# Patient Record
Sex: FEMALE | Race: WHITE | NOT HISPANIC OR LATINO | Employment: OTHER | URBAN - METROPOLITAN AREA
[De-identification: names, ages, dates, MRNs, and addresses within clinical notes are randomized per-mention and may not be internally consistent; named-entity substitution may affect disease eponyms.]

---

## 2018-02-06 ENCOUNTER — CONVERSION ENCOUNTER (OUTPATIENT)
Dept: INTERNAL MEDICINE | Facility: CLINIC | Age: 83
End: 2018-02-06

## 2018-02-06 ENCOUNTER — OFFICE VISIT CONVERTED (OUTPATIENT)
Dept: INTERNAL MEDICINE | Facility: CLINIC | Age: 83
End: 2018-02-06
Attending: INTERNAL MEDICINE

## 2018-04-13 ENCOUNTER — OFFICE VISIT CONVERTED (OUTPATIENT)
Dept: INTERNAL MEDICINE | Facility: CLINIC | Age: 83
End: 2018-04-13
Attending: INTERNAL MEDICINE

## 2018-04-30 ENCOUNTER — OFFICE VISIT CONVERTED (OUTPATIENT)
Dept: INTERNAL MEDICINE | Facility: CLINIC | Age: 83
End: 2018-04-30
Attending: INTERNAL MEDICINE

## 2018-06-08 ENCOUNTER — CONVERSION ENCOUNTER (OUTPATIENT)
Dept: INTERNAL MEDICINE | Facility: CLINIC | Age: 83
End: 2018-06-08

## 2018-06-08 ENCOUNTER — OFFICE VISIT CONVERTED (OUTPATIENT)
Dept: INTERNAL MEDICINE | Facility: CLINIC | Age: 83
End: 2018-06-08
Attending: INTERNAL MEDICINE

## 2018-07-17 ENCOUNTER — OFFICE VISIT CONVERTED (OUTPATIENT)
Dept: INTERNAL MEDICINE | Facility: CLINIC | Age: 83
End: 2018-07-17
Attending: INTERNAL MEDICINE

## 2018-08-20 ENCOUNTER — OFFICE VISIT CONVERTED (OUTPATIENT)
Dept: INTERNAL MEDICINE | Facility: CLINIC | Age: 83
End: 2018-08-20
Attending: INTERNAL MEDICINE

## 2018-11-20 ENCOUNTER — OFFICE VISIT CONVERTED (OUTPATIENT)
Dept: INTERNAL MEDICINE | Facility: CLINIC | Age: 83
End: 2018-11-20
Attending: INTERNAL MEDICINE

## 2019-01-22 ENCOUNTER — HOSPITAL ENCOUNTER (OUTPATIENT)
Dept: OTHER | Facility: HOSPITAL | Age: 84
Discharge: HOME | End: 2019-01-22
Attending: INTERNAL MEDICINE

## 2019-01-22 ENCOUNTER — CONVERSION ENCOUNTER (OUTPATIENT)
Dept: INTERNAL MEDICINE | Facility: CLINIC | Age: 84
End: 2019-01-22

## 2019-01-22 ENCOUNTER — OFFICE VISIT CONVERTED (OUTPATIENT)
Dept: INTERNAL MEDICINE | Facility: CLINIC | Age: 84
End: 2019-01-22
Attending: INTERNAL MEDICINE

## 2019-02-15 ENCOUNTER — HOSPITAL ENCOUNTER (OUTPATIENT)
Dept: PHYSICAL THERAPY | Facility: CLINIC | Age: 84
Setting detail: RECURRING SERIES
Discharge: HOME | End: 2019-02-18
Attending: INTERNAL MEDICINE

## 2019-05-21 ENCOUNTER — OFFICE VISIT CONVERTED (OUTPATIENT)
Dept: INTERNAL MEDICINE | Facility: CLINIC | Age: 84
End: 2019-05-21
Attending: INTERNAL MEDICINE

## 2019-05-28 ENCOUNTER — HOSPITAL ENCOUNTER (OUTPATIENT)
Dept: OTHER | Facility: HOSPITAL | Age: 84
Discharge: HOME | End: 2019-05-28
Attending: INTERNAL MEDICINE

## 2019-05-28 LAB
ALBUMIN SERPL-MCNC: 4.1 G/DL (ref 3.5–5)
ALBUMIN/GLOB SERPL: 1.6 {RATIO} (ref 1.4–2.6)
ALP SERPL-CCNC: 59 U/L (ref 43–160)
ALT SERPL-CCNC: 12 U/L (ref 10–40)
ANION GAP SERPL CALC-SCNC: 15 MMOL/L (ref 8–19)
AST SERPL-CCNC: 21 U/L (ref 15–50)
BASOPHILS # BLD AUTO: 0.04 10*3/UL (ref 0–0.2)
BASOPHILS NFR BLD AUTO: 0.9 % (ref 0–3)
BILIRUB SERPL-MCNC: 0.95 MG/DL (ref 0.2–1.3)
BUN SERPL-MCNC: 13 MG/DL (ref 5–25)
BUN/CREAT SERPL: 23 {RATIO} (ref 6–20)
CALCIUM SERPL-MCNC: 9.3 MG/DL (ref 8.7–10.4)
CHLORIDE SERPL-SCNC: 102 MMOL/L (ref 99–111)
CHOLEST SERPL-MCNC: 241 MG/DL (ref 107–200)
CHOLEST/HDLC SERPL: 2.8 {RATIO} (ref 3–6)
CONV ABS IMM GRAN: 0.01 10*3/UL (ref 0–0.2)
CONV CO2: 29 MMOL/L (ref 22–32)
CONV IMMATURE GRAN: 0.2 % (ref 0–1.8)
CONV TOTAL PROTEIN: 6.6 G/DL (ref 6.3–8.2)
CREAT UR-MCNC: 0.56 MG/DL (ref 0.5–0.9)
DEPRECATED RDW RBC AUTO: 46.7 FL (ref 36.4–46.3)
EOSINOPHIL # BLD AUTO: 0.19 10*3/UL (ref 0–0.7)
EOSINOPHIL # BLD AUTO: 4.1 % (ref 0–7)
ERYTHROCYTE [DISTWIDTH] IN BLOOD BY AUTOMATED COUNT: 13.6 % (ref 11.7–14.4)
FOLATE SERPL-MCNC: >20 NG/ML (ref 4.8–20)
GFR SERPLBLD BASED ON 1.73 SQ M-ARVRAT: >60 ML/MIN/{1.73_M2}
GLOBULIN UR ELPH-MCNC: 2.5 G/DL (ref 2–3.5)
GLUCOSE SERPL-MCNC: 94 MG/DL (ref 65–99)
HBA1C MFR BLD: 13.5 G/DL (ref 12–16)
HCT VFR BLD AUTO: 41.8 % (ref 37–47)
HDLC SERPL-MCNC: 85 MG/DL (ref 40–60)
IRON SATN MFR SERPL: 38 % (ref 20–55)
IRON SERPL-MCNC: 128 UG/DL (ref 60–170)
LDLC SERPL CALC-MCNC: 140 MG/DL (ref 70–100)
LYMPHOCYTES # BLD AUTO: 2.38 10*3/UL (ref 1–5)
MCH RBC QN AUTO: 30.2 PG (ref 27–31)
MCHC RBC AUTO-ENTMCNC: 32.3 G/DL (ref 33–37)
MCV RBC AUTO: 93.5 FL (ref 81–99)
MONOCYTES # BLD AUTO: 0.36 10*3/UL (ref 0.2–1.2)
MONOCYTES NFR BLD AUTO: 7.7 % (ref 3–10)
NEUTROPHILS # BLD AUTO: 1.7 10*3/UL (ref 2–8)
NEUTROPHILS NFR BLD AUTO: 36.2 % (ref 30–85)
NRBC CBCN: 0 % (ref 0–0.7)
OSMOLALITY SERPL CALC.SUM OF ELEC: 294 MOSM/KG (ref 273–304)
PLATELET # BLD AUTO: 239 10*3/UL (ref 130–400)
PMV BLD AUTO: 10.3 FL (ref 9.4–12.3)
POTASSIUM SERPL-SCNC: 4 MMOL/L (ref 3.5–5.3)
RBC # BLD AUTO: 4.47 10*6/UL (ref 4.2–5.4)
SODIUM SERPL-SCNC: 142 MMOL/L (ref 135–147)
T4 FREE SERPL-MCNC: 1.1 NG/DL (ref 0.9–1.8)
TIBC SERPL-MCNC: 337 UG/DL (ref 245–450)
TRANSFERRIN SERPL-MCNC: 236 MG/DL (ref 250–380)
TRIGL SERPL-MCNC: 80 MG/DL (ref 40–150)
TSH SERPL-ACNC: 1.24 M[IU]/L (ref 0.27–4.2)
VARIANT LYMPHS NFR BLD MANUAL: 50.9 % (ref 20–45)
VIT B12 SERPL-MCNC: 651 PG/ML (ref 211–911)
VLDLC SERPL-MCNC: 16 MG/DL (ref 5–37)
WBC # BLD AUTO: 4.68 10*3/UL (ref 4.8–10.8)

## 2019-07-30 ENCOUNTER — CONVERSION ENCOUNTER (OUTPATIENT)
Dept: INTERNAL MEDICINE | Facility: CLINIC | Age: 84
End: 2019-07-30

## 2019-07-30 ENCOUNTER — OFFICE VISIT CONVERTED (OUTPATIENT)
Dept: INTERNAL MEDICINE | Facility: CLINIC | Age: 84
End: 2019-07-30
Attending: INTERNAL MEDICINE

## 2019-11-01 ENCOUNTER — CONVERSION ENCOUNTER (OUTPATIENT)
Dept: INTERNAL MEDICINE | Facility: CLINIC | Age: 84
End: 2019-11-01

## 2019-11-01 ENCOUNTER — OFFICE VISIT CONVERTED (OUTPATIENT)
Dept: INTERNAL MEDICINE | Facility: CLINIC | Age: 84
End: 2019-11-01
Attending: INTERNAL MEDICINE

## 2020-01-14 ENCOUNTER — CONVERSION ENCOUNTER (OUTPATIENT)
Dept: INTERNAL MEDICINE | Facility: CLINIC | Age: 85
End: 2020-01-14

## 2020-01-14 ENCOUNTER — OFFICE VISIT CONVERTED (OUTPATIENT)
Dept: INTERNAL MEDICINE | Facility: CLINIC | Age: 85
End: 2020-01-14
Attending: INTERNAL MEDICINE

## 2020-03-03 ENCOUNTER — OFFICE VISIT CONVERTED (OUTPATIENT)
Dept: INTERNAL MEDICINE | Facility: CLINIC | Age: 85
End: 2020-03-03
Attending: INTERNAL MEDICINE

## 2020-03-03 ENCOUNTER — CONVERSION ENCOUNTER (OUTPATIENT)
Dept: INTERNAL MEDICINE | Facility: CLINIC | Age: 85
End: 2020-03-03

## 2020-03-12 ENCOUNTER — HOSPITAL ENCOUNTER (OUTPATIENT)
Dept: OTHER | Facility: HOSPITAL | Age: 85
Discharge: HOME | End: 2020-03-12
Attending: INTERNAL MEDICINE

## 2020-03-12 LAB
25(OH)D3 SERPL-MCNC: 54.7 NG/ML (ref 30–100)
ALBUMIN SERPL-MCNC: 4.2 G/DL (ref 3.5–5)
ALBUMIN/GLOB SERPL: 1.7 {RATIO} (ref 1.4–2.6)
ALP SERPL-CCNC: 64 U/L (ref 43–160)
ALT SERPL-CCNC: 19 U/L (ref 10–40)
ANION GAP SERPL CALC-SCNC: 16 MMOL/L (ref 8–19)
AST SERPL-CCNC: 30 U/L (ref 15–50)
BASOPHILS # BLD AUTO: 0.03 10*3/UL (ref 0–0.2)
BASOPHILS NFR BLD AUTO: 0.6 % (ref 0–3)
BILIRUB SERPL-MCNC: 0.79 MG/DL (ref 0.2–1.3)
BUN SERPL-MCNC: 11 MG/DL (ref 5–25)
BUN/CREAT SERPL: 20 {RATIO} (ref 6–20)
CALCIUM SERPL-MCNC: 9.6 MG/DL (ref 8.7–10.4)
CHLORIDE SERPL-SCNC: 101 MMOL/L (ref 99–111)
CONV ABS IMM GRAN: 0.01 10*3/UL (ref 0–0.2)
CONV CO2: 28 MMOL/L (ref 22–32)
CONV IMMATURE GRAN: 0.2 % (ref 0–1.8)
CONV TOTAL PROTEIN: 6.7 G/DL (ref 6.3–8.2)
CREAT UR-MCNC: 0.55 MG/DL (ref 0.5–0.9)
DEPRECATED RDW RBC AUTO: 46 FL (ref 36.4–46.3)
EOSINOPHIL # BLD AUTO: 0.22 10*3/UL (ref 0–0.7)
EOSINOPHIL # BLD AUTO: 4.2 % (ref 0–7)
ERYTHROCYTE [DISTWIDTH] IN BLOOD BY AUTOMATED COUNT: 13.3 % (ref 11.7–14.4)
GFR SERPLBLD BASED ON 1.73 SQ M-ARVRAT: >60 ML/MIN/{1.73_M2}
GLOBULIN UR ELPH-MCNC: 2.5 G/DL (ref 2–3.5)
GLUCOSE SERPL-MCNC: 94 MG/DL (ref 65–99)
HCT VFR BLD AUTO: 42.5 % (ref 37–47)
HGB BLD-MCNC: 13.8 G/DL (ref 12–16)
IRON SATN MFR SERPL: 17 % (ref 20–55)
IRON SERPL-MCNC: 60 UG/DL (ref 60–170)
LYMPHOCYTES # BLD AUTO: 2.32 10*3/UL (ref 1–5)
LYMPHOCYTES NFR BLD AUTO: 44.5 % (ref 20–45)
MCH RBC QN AUTO: 30.7 PG (ref 27–31)
MCHC RBC AUTO-ENTMCNC: 32.5 G/DL (ref 33–37)
MCV RBC AUTO: 94.7 FL (ref 81–99)
MONOCYTES # BLD AUTO: 0.44 10*3/UL (ref 0.2–1.2)
MONOCYTES NFR BLD AUTO: 8.4 % (ref 3–10)
NEUTROPHILS # BLD AUTO: 2.19 10*3/UL (ref 2–8)
NEUTROPHILS NFR BLD AUTO: 42.1 % (ref 30–85)
NRBC CBCN: 0 % (ref 0–0.7)
OSMOLALITY SERPL CALC.SUM OF ELEC: 291 MOSM/KG (ref 273–304)
PLATELET # BLD AUTO: 235 10*3/UL (ref 130–400)
PMV BLD AUTO: 10.3 FL (ref 9.4–12.3)
POTASSIUM SERPL-SCNC: 4.1 MMOL/L (ref 3.5–5.3)
RBC # BLD AUTO: 4.49 10*6/UL (ref 4.2–5.4)
SODIUM SERPL-SCNC: 141 MMOL/L (ref 135–147)
T4 FREE SERPL-MCNC: 1.2 NG/DL (ref 0.9–1.8)
TIBC SERPL-MCNC: 356 UG/DL (ref 245–450)
TRANSFERRIN SERPL-MCNC: 249 MG/DL (ref 250–380)
TSH SERPL-ACNC: 1.21 M[IU]/L (ref 0.27–4.2)
WBC # BLD AUTO: 5.21 10*3/UL (ref 4.8–10.8)

## 2020-03-14 LAB
CONV EBV EARLY ANTIGEN: 10.6 U/ML (ref 0–10.9)
CONV EBV NUCLEAR ANTIGEN: 63.2 U/ML (ref 0–21.9)
CONV EPSTEIN BARR VIRAL CAPSID IGM: <10 U/ML (ref 0–43.9)
CONV EPSTEIN BARR VIRUS ANTIBODY TO VIRAL CAPSID IGG: 526 U/ML (ref 0–21.9)

## 2020-05-04 ENCOUNTER — TELEPHONE CONVERTED (OUTPATIENT)
Dept: INTERNAL MEDICINE | Facility: CLINIC | Age: 85
End: 2020-05-04
Attending: INTERNAL MEDICINE

## 2020-06-12 ENCOUNTER — TELEPHONE CONVERTED (OUTPATIENT)
Dept: INTERNAL MEDICINE | Facility: CLINIC | Age: 85
End: 2020-06-12
Attending: INTERNAL MEDICINE

## 2021-01-26 ENCOUNTER — TELEPHONE CONVERTED (OUTPATIENT)
Dept: INTERNAL MEDICINE | Facility: CLINIC | Age: 86
End: 2021-01-26
Attending: INTERNAL MEDICINE

## 2021-02-22 ENCOUNTER — HOSPITAL ENCOUNTER (OUTPATIENT)
Dept: VACCINE CLINIC | Facility: HOSPITAL | Age: 86
Discharge: HOME | End: 2021-02-22
Attending: INTERNAL MEDICINE

## 2021-03-17 ENCOUNTER — HOSPITAL ENCOUNTER (OUTPATIENT)
Dept: OTHER | Facility: HOSPITAL | Age: 86
Discharge: HOME | End: 2021-03-17
Attending: INTERNAL MEDICINE

## 2021-03-17 ENCOUNTER — OFFICE VISIT CONVERTED (OUTPATIENT)
Dept: INTERNAL MEDICINE | Facility: CLINIC | Age: 86
End: 2021-03-17
Attending: INTERNAL MEDICINE

## 2021-03-17 ENCOUNTER — CONVERSION ENCOUNTER (OUTPATIENT)
Dept: INTERNAL MEDICINE | Facility: CLINIC | Age: 86
End: 2021-03-17

## 2021-03-17 LAB
ALBUMIN SERPL-MCNC: 4.4 G/DL (ref 3.5–5)
ALBUMIN/GLOB SERPL: 1.7 {RATIO} (ref 1.4–2.6)
ALP SERPL-CCNC: 68 U/L (ref 43–160)
ALT SERPL-CCNC: 19 U/L (ref 10–40)
ANION GAP SERPL CALC-SCNC: 15 MMOL/L (ref 8–19)
AST SERPL-CCNC: 26 U/L (ref 15–50)
BASOPHILS # BLD AUTO: 0.04 10*3/UL (ref 0–0.2)
BASOPHILS NFR BLD AUTO: 0.7 % (ref 0–3)
BILIRUB SERPL-MCNC: 0.62 MG/DL (ref 0.2–1.3)
BUN SERPL-MCNC: 16 MG/DL (ref 5–25)
BUN/CREAT SERPL: 34 {RATIO} (ref 6–20)
CALCIUM SERPL-MCNC: 10 MG/DL (ref 8.7–10.4)
CHLORIDE SERPL-SCNC: 102 MMOL/L (ref 99–111)
CHOLEST SERPL-MCNC: 259 MG/DL (ref 107–200)
CHOLEST/HDLC SERPL: 2.7 {RATIO} (ref 3–6)
CONV ABS IMM GRAN: 0.01 10*3/UL (ref 0–0.2)
CONV CO2: 32 MMOL/L (ref 22–32)
CONV IMMATURE GRAN: 0.2 % (ref 0–1.8)
CONV TOTAL PROTEIN: 7 G/DL (ref 6.3–8.2)
CREAT UR-MCNC: 0.47 MG/DL (ref 0.5–0.9)
DEPRECATED RDW RBC AUTO: 46 FL (ref 36.4–46.3)
EOSINOPHIL # BLD AUTO: 0.06 10*3/UL (ref 0–0.7)
EOSINOPHIL # BLD AUTO: 1.1 % (ref 0–7)
ERYTHROCYTE [DISTWIDTH] IN BLOOD BY AUTOMATED COUNT: 12.9 % (ref 11.7–14.4)
FOLATE SERPL-MCNC: >20 NG/ML (ref 4.8–20)
GFR SERPLBLD BASED ON 1.73 SQ M-ARVRAT: >60 ML/MIN/{1.73_M2}
GLOBULIN UR ELPH-MCNC: 2.6 G/DL (ref 2–3.5)
GLUCOSE SERPL-MCNC: 112 MG/DL (ref 65–99)
HCT VFR BLD AUTO: 44.6 % (ref 37–47)
HDLC SERPL-MCNC: 97 MG/DL (ref 40–60)
HGB BLD-MCNC: 14.2 G/DL (ref 12–16)
LDLC SERPL CALC-MCNC: 146 MG/DL (ref 70–100)
LYMPHOCYTES # BLD AUTO: 1.72 10*3/UL (ref 1–5)
LYMPHOCYTES NFR BLD AUTO: 32.1 % (ref 20–45)
MCH RBC QN AUTO: 30.7 PG (ref 27–31)
MCHC RBC AUTO-ENTMCNC: 31.8 G/DL (ref 33–37)
MCV RBC AUTO: 96.5 FL (ref 81–99)
MONOCYTES # BLD AUTO: 0.48 10*3/UL (ref 0.2–1.2)
MONOCYTES NFR BLD AUTO: 9 % (ref 3–10)
NEUTROPHILS # BLD AUTO: 3.05 10*3/UL (ref 2–8)
NEUTROPHILS NFR BLD AUTO: 56.9 % (ref 30–85)
NRBC CBCN: 0 % (ref 0–0.7)
OSMOLALITY SERPL CALC.SUM OF ELEC: 302 MOSM/KG (ref 273–304)
PLATELET # BLD AUTO: 253 10*3/UL (ref 130–400)
PMV BLD AUTO: 10.4 FL (ref 9.4–12.3)
POTASSIUM SERPL-SCNC: 4.3 MMOL/L (ref 3.5–5.3)
RBC # BLD AUTO: 4.62 10*6/UL (ref 4.2–5.4)
SODIUM SERPL-SCNC: 145 MMOL/L (ref 135–147)
T4 FREE SERPL-MCNC: 1.3 NG/DL (ref 0.9–1.8)
TRIGL SERPL-MCNC: 81 MG/DL (ref 40–150)
TSH SERPL-ACNC: 0.83 M[IU]/L (ref 0.27–4.2)
VIT B12 SERPL-MCNC: 816 PG/ML (ref 211–911)
VLDLC SERPL-MCNC: 16 MG/DL (ref 5–37)
WBC # BLD AUTO: 5.36 10*3/UL (ref 4.8–10.8)

## 2021-03-18 LAB — 25(OH)D3 SERPL-MCNC: 62.9 NG/ML (ref 30–100)

## 2021-03-26 ENCOUNTER — HOSPITAL ENCOUNTER (OUTPATIENT)
Dept: VACCINE CLINIC | Facility: HOSPITAL | Age: 86
Discharge: HOME | End: 2021-03-26
Attending: INTERNAL MEDICINE

## 2021-04-15 ENCOUNTER — HOSPITAL ENCOUNTER (OUTPATIENT)
Dept: PHYSICAL THERAPY | Facility: CLINIC | Age: 86
Setting detail: RECURRING SERIES
Discharge: HOME | End: 2021-04-15
Attending: INTERNAL MEDICINE

## 2021-05-13 NOTE — PROGRESS NOTES
"   Quick Note      Patient Name: Emi Ball   Patient ID: [id number]   Sex: Female   YOB: 1935    Primary Care Provider: Yoselyn Godfrey MD   Referring Provider: Yoselyn Godfrey MD    Visit Date: May 4, 2020    Provider: Yoselyn Godfrey MD   Location: [facility]   Location Address: [address] [place] [address]  [id number]   Location Phone: [phone]          History Of Present Illness  TELEHEALTH TELEPHONE VISIT  Chief Complaint: \"my reflux is worse\"   Emi Ball is a 84 year old /White female who is presenting for evaluation via telehealth telephone visit. Verbal consent obtained before beginning visit.   Provider spent 11 minutes with the patient during telehealth visit.   The following staff were present during this visit: call started by Suzanne Carpenter and transferred to me   Past Medical History/Overview of Patient Symptoms     states that she got the liquid medicine for reflux however it made her sick and then it was recalled  she is now having some dysphagia she thinks this is because her reflux is worse  it has been going on about 3 days now    does feel like allergies are a little worse right now    bent over and had pain in her side on standing  now has pain with certain movements  when not moving hands overhead etc she feels normal  no numbness or tingling    has been able to get brace for scoliosis or do PT due to COVID             Physical Examination                Assessment  · Gastroesophageal reflux     530.81/K21.9  will try prevacid  · Scoliosis     737.30/M41.9  reschedule pt and brace appt when able    Problems Reconciled  Plan  · Orders  o Physician Telephone Evaluation, 11-20 minutes (27799) - - 05/04/2020  · Medications  o Prevacid SoluTab 15 mg oral tablet,disintegrat, delay rel   SIG: take 1 tablet (15 mg) and place on top of the tongue where it will dissolve, then swallow by oral route once daily   DISP: (90) tablets with 0 " refills  Prescribed on 05/04/2020     o Medications have been Reconciled  o Transition of Care or Provider Policy  · Instructions  o Plan Of Care:             Electronically Signed by: Yoeslyn Godfrey MD -Author on May 4, 2020 03:02:30 PM

## 2021-05-13 NOTE — PROGRESS NOTES
"   Progress Note      Patient Name: Emi Ball   Patient ID: [id number]   Sex: Female   YOB: 1935    Primary Care Provider: Yoselyn Godfrey MD   Referring Provider: Yoselyn Godfrey MD    Visit Date: June 12, 2020    Provider: Yoselyn Godfrey MD   Location: [facility]   Location Address: [address] [place] [address]  [id number]   Location Phone: [phone]          History Of Present Illness  TELEHEALTH TELEPHONE VISIT  Chief Complaint: \"follow up for my allergies\"   Emi Ball is a 84 year old /White female who is presenting for evaluation via telehealth telephone visit. Verbal consent obtained before beginning visit.   Provider spent 11 minutes with the patient during the telehealth visit.   The following staff were present during this visit: none   Past Medical History/ Overview of Patient Symptoms  Emi Ball is a 84 year old /White female who presents for evaluation and treatment of:      chornic issues    her back isn't doing better  but she didn't go to therapy or to the  clinic for a brace because of COVID    states that the stomach medicine she got was very expensive  it doesn't seem to be helping tremendously either at this point    allergies are stable    no chest pain  no trouble breathing         Past Medical History  Disease Name Date Onset Notes   Gastroesophageal reflux --  --    Myocardial Infarction --  --    Otalgia --  --    Pain, Head --  --    Scoliosis 05/04/2020 --    Vitamin D deficiency --  --          Past Surgical History  Procedure Name Date Notes   *No Past Surgical History --  --          Medication List  Name Date Started Instructions   aspirin 81 mg Oral Tablet, Delayed Release (E.C.)  --    Caltrate 600 + D 600 mg (1,500 mg)-800 unit oral tablet,chewable  --    Co Q-10 oral  --    Fish Oil 1,000 mg Oral Capsule  --    multivitamin Oral Tablet  take 1 tablet by oral route once daily with food "   Prevacid SoluTab 15 mg oral tablet,disintegrat, delay rel 06/19/2020 take 1 tablet (15 mg) and place on top of the tongue where it will dissolve, then swallow by oral route once daily   Vitamin D3 2,000 unit oral capsule  --          Allergy List  Allergen Name Date Reaction Notes   iodine --  --  --    IV DYE, IODINE CONTAINING CONTRAST MEDIA --  --  --    Strawberry --  --  --          Family Medical History  Disease Name Relative/Age Notes   Heart Attack (MI)  --          Social History  Finding Status Start/Stop Quantity Notes   Second hand smoke exposure Former --/-- --  30+ years   Tobacco Never --/-- --  --          Immunizations  NameDate Admin Mfg Trade Name Lot Number Route Inj VIS Given VIS Publication   [id number]/01/2018 SKB Fluarix, quadrivalent, preservative free [id number] NE NE 11/20/2018 08/07/2015   Comments:          Review of Systems  · Constitutional  o Denies  o : fever, fatigue, weight loss, weight gain  · Cardiovascular  o Denies  o : lower extremity edema, claudication, chest pressure, palpitations  · Respiratory  o Denies  o : shortness of breath, wheezing, frequent cough, hemoptysis, dyspnea on exertion  · Gastrointestinal  o Denies  o : nausea, vomiting, diarrhea, constipation, abdominal pain      Physical Examination     Gen: well-nourished, no acute distress  HENT: atraumatic, normocephalic  Eyes: extraocular movements intact, no scleral icterus  Lung: breathing comfortably, no cough  Skin: no visible rash, no lesions  Neuro: grossly oriented to person, place, and time. no facial droop   Psych: normal mood and affect               Assessment  · Scoliosis     737.30/M41.9  encouraged her to get back in with PT and with the brace people  offered follow up with imaging, but she wishes to hold off at this point  · Gastroesophageal reflux     530.81/K21.9  will try to find a better medicine that works for her    Problems Reconciled  Plan  · Orders  o Physician Telephone Evaluation, 11-20  minutes (77447) - - 06/19/2020  o ACO-39: Current medications updated and reviewed () - - 06/12/2020  · Medications  o Medications have been Reconciled  o Transition of Care or Provider Policy  · Instructions  o Plan Of Care:   o Patient was educated/instructed on their diagnosis, treatment and medications prior to discharge from the clinic today.            Electronically Signed by: Yoselyn Godfrey MD -Author on June 19, 2020 07:52:25 PM

## 2021-05-14 VITALS
BODY MASS INDEX: 18.37 KG/M2 | HEIGHT: 58 IN | TEMPERATURE: 99 F | HEART RATE: 90 BPM | OXYGEN SATURATION: 94 % | SYSTOLIC BLOOD PRESSURE: 140 MMHG | DIASTOLIC BLOOD PRESSURE: 90 MMHG | WEIGHT: 87.5 LBS

## 2021-05-14 NOTE — PROGRESS NOTES
"   Progress Note      Patient Name: Emi Ball   Patient ID: [id number]   Sex: Female   YOB: 1935    Primary Care Provider: Yoselyn Godfrey MD   Referring Provider: Yoselyn Godfrey MD    Visit Date: January 26, 2021    Provider: Yoselyn Godfrey MD   Location: [facility]   Location Address: [address] [place] [address]  [id number]   Location Phone: [phone]          Chief Complaint  · \"when I got outside I lose my voice\"  · \"a air fryer fell on my right leg and my right foot and lower leg is swollen\"  · \"I get short of air at times\"  · \"when can I get my COVID vaccine\"      History Of Present Illness  TELEHEALTH TELEPHONE VISIT  Emi Ball is a 85 year old /White female who is presenting for evaluation via telehealth telephone visit. Verbal consent obtained before beginning visit.   Provider spent 15 minutes with the patient during the telehealth visit.   The following staff were present during this visit: none   Past Medical History/ Overview of Patient Symptoms  Emi Ball is a 85 year old /White female who presents for evaluation and treatment of:      states that she really wants the covid vaccine    states that she is losing her voice when she goes outside  Otherwise her voice does fine  She has not been taking allergy medicines    Still dealing with her GERD would like a refill on her Prevacid    Has not been seen for her back however she does feel like her back is worsening she states that she does not want to get out and about during Covid even if it means her back is going to worsen  No numbness or tingling         Review of Systems  · Constitutional  o Denies  o : fever, fatigue, weight loss, weight gain  · Cardiovascular  o Denies  o : lower extremity edema, claudication, chest pressure, palpitations  · Respiratory  o Denies  o : shortness of breath, wheezing, frequent cough, hemoptysis, dyspnea on " exertion  · Gastrointestinal  o Denies  o : nausea, vomiting, diarrhea, constipation, abdominal pain          Assessment  · Scoliosis     737.30/M41.9  Think about if she would like further treatment for this and let us know  Has been unwilling to do therapy etc. due to Covid  · Gastroesophageal reflux     530.81/K21.9  Will restart medication  · Vitamin D deficiency     268.9/E55.9  · Screening for lipid disorders     V77.91/Z13.220  · Allergic rhinitis due to allergen     477.9/J30.9  Claritin  · Fatigue     780.79/R53.83  Will check labs and adjust medications based on lab results  · Dyspnea     786.09/R06.00  Unsure of etiology she will monitor closely will try medication first    Problems Reconciled  Plan  · Orders  o Vitamin D Level (16705) - 268.9/E55.9 - 01/26/2021  o Lipid Panel [place] (26036) - V77.91/Z13.220 - 01/26/2021  o B12 Folate levels (B12FO) - 780.79/R53.83 - 01/26/2021  o CBC with Auto Diff [place] (95972) - 786.09/R06.00, 737.30/M41.9, 530.81/K21.9 - 01/26/2021  o CMP [place] (98159) - 786.09/R06.00, 737.30/M41.9, 530.81/K21.9 - 01/26/2021  o Thyroid Profile (45135, 42865, THYII) - 780.79/R53.83 - 01/26/2021  o Vitamin D (25-Hydroxy) Level (35065) - 737.30/M41.9, 780.79/R53.83 - 01/26/2021  o Physician Telephone Evaluation, 11-20 minutes (64363) - - 01/26/2021  o ACO-39: Current medications updated and reviewed (1159F, ) - - 01/26/2021  o CXR PA/Lat [place] [facility] (73692) - 786.09/R06.00, 737.30/M41.9 - 01/26/2021  · Medications  o Claritin 10 mg oral tablet   SIG: take 1 tablet (10 mg) by oral route once daily; chewable tablet   DISP: (90) Tablet with 0 refills  Prescribed on 01/26/2021     o Medications have been Reconciled  o Transition of Care or Provider Policy  · Instructions  o Plan Of Care:   o Patient was educated/instructed on their diagnosis, treatment and medications prior to discharge from the clinic today.            Electronically Signed by: Yoselyn Godfrey MD -Author on March 11,  2021 01:00:29 PM

## 2021-05-14 NOTE — PROGRESS NOTES
"   Progress Note      Patient Name: Emi Ball   Patient ID: [id number]   Sex: Female   YOB: 1935    Primary Care Provider: Yoselyn Godfrey MD   Referring Provider: Yoselyn Godfrey MD    Visit Date: March 17, 2021    Provider: Yoselyn Godfrey MD   Location: [facility]   Location Address: [address] [place] [address]  [id number]   Location Phone: [phone]          Chief Complaint  · \"back pain\"      History Of Present Illness  Emi Ball is a 85 year old /White female who presents for evaluation and treatment of:      chronic back pain  states that she didn't go to therapy due to COVID    however she is interested in therapy  she continues to have significant pain in her back  worse with walking  she does think her curvature is getting worse    prevacid is treating her well    still dealing with some allergies       Past Medical History  Disease Name Date Onset Notes   Gastroesophageal reflux --  --    Myocardial Infarction --  --    Otalgia --  --    Pain, Head --  --    Scoliosis 05/04/2020 --    Vitamin D deficiency --  --          Past Surgical History  Procedure Name Date Notes   *No Past Surgical History --  --          Medication List  Name Date Started Instructions   aspirin 81 mg Oral Tablet, Delayed Release (E.C.)  --    Caltrate 600 + D 600 mg (1,500 mg)-800 unit oral tablet,chewable  --    Co Q-10 oral  --    Fish Oil 1,000 mg Oral Capsule  --    multivitamin Oral Tablet  take 1 tablet by oral route once daily with food   Prevacid SoluTab 15 mg oral tablet,disintegrat, delay rel 08/28/2020 take 1 tablet (15 mg) and place on top of the tongue where it will dissolve, then swallow by oral route once daily   Vitamin D3 2,000 unit oral capsule  --          Allergy List  Allergen Name Date Reaction Notes   iodine --  --  --    IV DYE, IODINE CONTAINING CONTRAST MEDIA --  --  --    Strawberry --  --  --        Allergies Reconciled  Family " "Medical History  Disease Name Relative/Age Notes   Heart Attack (MI)  --          Social History  Finding Status Start/Stop Quantity Notes   Second hand smoke exposure Former --/-- --  30+ years   Tobacco Never --/-- --  --          Immunizations  NameDate Admin Mfg Trade Name Lot Number Route Inj VIS Given VIS Publication   Ldaadhsvd35/01/2018 SKB Fluarix, quadrivalent, preservative free [id number] NE NE 11/20/2018 08/07/2015   Comments:          Vitals  Date Time BP Position Site L\R Cuff Size HR RR TEMP (F) WT  HT  BMI kg/m2 BSA m2 O2 Sat FR L/min FiO2 HC       11/01/2019 02:10 /78 Sitting    78 - R  98.1 94lbs 6oz 4'  10.5\" 19.39 1.33 96 %  21%    01/14/2020 01:08 /88 Sitting    86 - R  100 91lbs 4oz 4'  10.5\" 18.75 1.31 93 %  21%    03/03/2020 02:08 /88 Sitting    89 - R  99.3 93lbs 2oz 4'  10.5\" 19.13 1.32 94 %  21%    03/17/2021 02:18 /90 Sitting    90 - R  99 87lbs 8oz 4'  10.5\" 17.98 1.28 94 %  21%          Physical Examination  · Constitutional  o Appearance  o : no acute distress, well-nourished  · Head and Face  o Head  o :   § Inspection  § : atraumatic, normocephalic  · Eyes  o Eyes  o : extraocular movements intact, no scleral icterus, no conjunctival injection  · Respiratory  o Respiratory Effort  o : breathing comfortably, symmetric chest rise  o Auscultation of Lungs  o : clear to asculatation bilaterally, no wheezes, rales, or rhonchii  · Cardiovascular  o Heart  o :   § Auscultation of Heart  § : regular rate and rhythm, no murmurs, rubs, or gallops  o Peripheral Vascular System  o :   § Extremities  § : no edema  · Neurologic  o Mental Status Examination  o :   § Orientation  § : grossly oriented to person, place and time  o Gait and Station  o :   § Gait Screening  § : normal gait  · Psychiatric  o General  o : normal mood and affect     severe scoliosis           Assessment  · Scoliosis     737.30/M41.9  will start therapy  · Gastroesophageal reflux     530.81/K21.9  cont " prevacid  · Vitamin D deficiency     268.9/E55.9  · Post menopausal syndrome     V49.81/Z78.0  · Lumbago     724.2/M54.5  will start physical therapy    Problems Reconciled  Plan  · Orders  o DEXA Bone Density, 1 or more sites, axial skeleton [facility] (00213) - V49.81/Z78.0 - 03/17/2021   after mid april please  o ACO-39: Current medications updated and reviewed (, 1159F) - - 03/17/2021  o PHYSICAL THERAPY/OCCUPATIONAL THERAPY CONSULTATION (Evaluate and Treat) (PTOTR) - 737.30/M41.9, 724.2/M54.5 - 03/17/2021   patient would like to schedule for mid April as that will be 2 weeks after her shot  · Medications  o mupirocin 2 % topical ointment   SIG: apply a small amount to the affected area by topical route 3 times per day   DISP: (1) Tube with 0 refills  Prescribed on 03/17/2021     o Medications have been Reconciled  o Transition of Care or Provider Policy  · Instructions  o Stop taking calcium supplements for at least 48 hours prior to your exam. Failure to stop supplements could alter results, and the radiologists will require you to reschedule your test.  o Patient was educated/instructed on their diagnosis, treatment and medications prior to discharge from the clinic today.  · Disposition  o 6 Week Follow Up            Electronically Signed by: Yoselyn Godfrey MD -Author on March 29, 2021 10:22:07 PM

## 2021-05-14 NOTE — PROGRESS NOTES
Progress Note      Patient Name: Emi Ball   Patient ID: [id number]   Sex: Female   YOB: 1935    Primary Care Provider: Yoselyn Godfrey MD   Referring Provider: Yoselyn Godfrey MD    Visit Date: March 17, 2021    Provider: Yoselyn Godfrey MD   Location: [facility]   Location Address: [address] [place] [address]  [id number]   Location Phone: [phone]          Chief Complaint  · Annual Wellness Exam      History Of Present Illness  The patient is a 85 year old /White female who has come to this office for her Annual Wellness Visit.   Her Primary Care Provider is Yoselyn Godfrey MD. Her comprehensive Care Team list, including suppliers, has been updated on the Facesheet. Her medical/family history, height, weight, BMI, and blood pressure have been reviewed and are in the chart. The Health Risk Assessment has been completed and scanned in the chart.   Medications are listed in the medication list.   The active problem list includes: Gastroesophageal reflux, Myocardial Infarction, Otalgia, Pain, Head, Scoliosis, and Vitamin D deficiency   The patient does not have a history of substance use.   Patient reports her diet is adequate.   The Mini-Cog has been administered and is scanned in chart. The results are negative. Her cognitive function is without limitation.   A hearing loss screen was completed today and the result is negative.   Patient does not have any risk factors for depression. Patient completed the PHQ-9 today and it has been scanned in the chart. The total score is 5-9.   The Get Up and Go screen was administered today and the result is negative.   The Ewing Index of Sullivan in ADLs indicated full function (score of 6).   A Falls Risk Assessment has been completed, including a review of home fall hazards and medication review.   Overall, the patient's functional ability is noted by this provider to be within normal limits. Her level  of safety is noted to be within normal limits. Her balance/gait is within normal limits. There have been no falls in the past year. Patient-specific home safety recommendations have been reviewed and a copy has been given to patient.   She denies issues with leaking urine.   There are no additional risk factors identified.   Living Will/Advanced Directive has not previously been completed.   Personalized health advice was given to the patient and a written health screening schedule was established; see Plan for details.       Past Medical History  Disease Name Date Onset Notes   Gastroesophageal reflux --  --    Myocardial Infarction --  --    Otalgia --  --    Pain, Head --  --    Scoliosis 05/04/2020 --    Vitamin D deficiency --  --          Past Surgical History  Procedure Name Date Notes   *No Past Surgical History --  --          Medication List  Name Date Started Instructions   aspirin 81 mg Oral Tablet, Delayed Release (E.C.)  --    Caltrate 600 + D 600 mg (1,500 mg)-800 unit oral tablet,chewable  --    Co Q-10 oral  --    Fish Oil 1,000 mg Oral Capsule  --    multivitamin Oral Tablet  take 1 tablet by oral route once daily with food   mupirocin 2 % topical ointment 03/17/2021 apply a small amount to the affected area by topical route 3 times per day   Prevacid SoluTab 15 mg oral tablet,disintegrat, delay rel 08/28/2020 take 1 tablet (15 mg) and place on top of the tongue where it will dissolve, then swallow by oral route once daily   Vitamin D3 2,000 unit oral capsule  --          Allergy List  Allergen Name Date Reaction Notes   iodine --  --  --    IV DYE, IODINE CONTAINING CONTRAST MEDIA --  --  --    Strawberry --  --  --          Family Medical History  Disease Name Relative/Age Notes   Heart Attack (MI)  --          Social History  Finding Status Start/Stop Quantity Notes   Second hand smoke exposure Former --/-- --  30+ years   Tobacco Never --/-- --  --          Immunizations  NameDate Admin [facility]  "Trade Name Lot Number Route Inj VIS Given VIS Publication   Taykbrlcy24/01/2018 SKB Fluarix, quadrivalent, preservative free [id number] NE NE 11/20/2018 08/07/2015   Comments:          Vitals  Date Time BP Position Site L\R Cuff Size HR RR TEMP (F) WT  HT  BMI kg/m2 BSA m2 O2 Sat FR L/min FiO2 HC       03/17/2021 02:18 /90 Sitting    90 - R  99 87lbs 8oz 4'  10.5\" 17.98 1.28 94 %  21%              Assessment  · Encounter for Medicare annual wellness exam     V70.0/Z00.00  · Screening for depression     V79.0/Z13.89  · Screening for alcoholism     V79.1/Z13.39  · Advanced care planning/counseling discussion     V65.49/Z71.89    Problems Reconciled  Plan  · Orders  o Falls Risk Assessment Completed (3288F) - V70.0/Z00.00 - 03/17/2021  o Brief hearing screening (written) [facility] () - V70.0/Z00.00 - 03/17/2021  o Annual Wellness Visit-includes a Personalized Prevention Plan of Service (PPS), SUBSEQUENT VISIT (Medicare) () - V70.0/Z00.00 - 03/17/2021  o ACO-13: Fall Risk Screening with no falls in past year or only one fall without injury in the past year (1101F) - V70.0/Z00.00 - 03/17/2021  o Presence or absence of urinary incontinence assessed ([place]ICK[place]) (1090F) - V70.0/Z00.00 - 03/17/2021  o ACO-18: Negative screen for clinical depression using a standardized tool () - V79.0/Z13.89 - 03/17/2021  o Negative alcohol screening () - V79.1/Z13.39 - 03/17/2021  o ACO - Pt declines to or was not able to provide an Advance Care Plan or name a Surrogate Decision Maker (1124F) - V65.49/Z71.89 - 03/17/2021  o Influenza immunization was not ordered or administered for reasons documented by clinician () - - 03/17/2021   refused  o ACO-39: Current medications updated and reviewed (, 1159F) - - 03/17/2021  o ACO-16: BMI within normal range and no follow-up plan is required () - - 03/17/2021  · Medications  o Medications have been Reconciled  o Transition of Care or Provider Policy  · Instructions  o Health " Risk Assessment has been reviewed with the patient.  o Written health screening schedule for next 5-10 years was established with patient; information scanned in chart and given/mailed to patient.  o Fall prevention methods discussed and a copy of recommendations given/mailed to patient.  o Today's PHQ-9 score is: _9__            Electronically Signed by: Yoselyn Godfrey MD -Author on March 28, 2021 10:33:39 PM

## 2021-05-15 VITALS
BODY MASS INDEX: 19.15 KG/M2 | DIASTOLIC BLOOD PRESSURE: 88 MMHG | HEART RATE: 86 BPM | OXYGEN SATURATION: 93 % | TEMPERATURE: 100 F | HEIGHT: 58 IN | WEIGHT: 91.25 LBS | SYSTOLIC BLOOD PRESSURE: 128 MMHG

## 2021-05-15 VITALS
HEART RATE: 83 BPM | HEIGHT: 58 IN | BODY MASS INDEX: 20.18 KG/M2 | TEMPERATURE: 99 F | DIASTOLIC BLOOD PRESSURE: 82 MMHG | OXYGEN SATURATION: 94 % | WEIGHT: 96.12 LBS | SYSTOLIC BLOOD PRESSURE: 148 MMHG

## 2021-05-15 VITALS
DIASTOLIC BLOOD PRESSURE: 88 MMHG | TEMPERATURE: 99.3 F | WEIGHT: 93.12 LBS | HEART RATE: 89 BPM | SYSTOLIC BLOOD PRESSURE: 152 MMHG | OXYGEN SATURATION: 94 % | HEIGHT: 58 IN | BODY MASS INDEX: 19.55 KG/M2

## 2021-05-15 VITALS
SYSTOLIC BLOOD PRESSURE: 124 MMHG | OXYGEN SATURATION: 94 % | HEIGHT: 58 IN | WEIGHT: 94.12 LBS | DIASTOLIC BLOOD PRESSURE: 82 MMHG | HEART RATE: 78 BPM | BODY MASS INDEX: 19.76 KG/M2 | TEMPERATURE: 98.3 F

## 2021-05-15 VITALS
TEMPERATURE: 98.1 F | BODY MASS INDEX: 19.81 KG/M2 | HEART RATE: 78 BPM | HEIGHT: 58 IN | WEIGHT: 94.37 LBS | DIASTOLIC BLOOD PRESSURE: 78 MMHG | OXYGEN SATURATION: 96 % | SYSTOLIC BLOOD PRESSURE: 110 MMHG

## 2021-05-16 VITALS
BODY MASS INDEX: 20.23 KG/M2 | SYSTOLIC BLOOD PRESSURE: 132 MMHG | WEIGHT: 96.37 LBS | OXYGEN SATURATION: 94 % | HEIGHT: 58 IN | RESPIRATION RATE: 16 BRPM | TEMPERATURE: 98 F | DIASTOLIC BLOOD PRESSURE: 86 MMHG | HEART RATE: 80 BPM

## 2021-05-16 VITALS
WEIGHT: 99.25 LBS | RESPIRATION RATE: 16 BRPM | OXYGEN SATURATION: 96 % | HEART RATE: 74 BPM | TEMPERATURE: 98.9 F | BODY MASS INDEX: 20.83 KG/M2 | HEIGHT: 58 IN | DIASTOLIC BLOOD PRESSURE: 84 MMHG | SYSTOLIC BLOOD PRESSURE: 172 MMHG

## 2021-05-16 VITALS
TEMPERATURE: 98.2 F | HEART RATE: 95 BPM | HEIGHT: 58 IN | SYSTOLIC BLOOD PRESSURE: 146 MMHG | WEIGHT: 97 LBS | OXYGEN SATURATION: 91 % | RESPIRATION RATE: 12 BRPM | DIASTOLIC BLOOD PRESSURE: 86 MMHG | BODY MASS INDEX: 20.36 KG/M2

## 2021-05-16 VITALS
OXYGEN SATURATION: 94 % | HEART RATE: 82 BPM | SYSTOLIC BLOOD PRESSURE: 136 MMHG | BODY MASS INDEX: 19.83 KG/M2 | TEMPERATURE: 98.2 F | RESPIRATION RATE: 12 BRPM | DIASTOLIC BLOOD PRESSURE: 84 MMHG | HEIGHT: 58 IN | WEIGHT: 94.5 LBS

## 2021-05-16 VITALS
TEMPERATURE: 99.1 F | BODY MASS INDEX: 20.26 KG/M2 | HEIGHT: 58 IN | DIASTOLIC BLOOD PRESSURE: 58 MMHG | OXYGEN SATURATION: 96 % | RESPIRATION RATE: 16 BRPM | SYSTOLIC BLOOD PRESSURE: 144 MMHG | WEIGHT: 96.5 LBS | HEART RATE: 84 BPM

## 2021-05-16 VITALS
WEIGHT: 96.25 LBS | TEMPERATURE: 97.8 F | HEIGHT: 58 IN | HEART RATE: 78 BPM | DIASTOLIC BLOOD PRESSURE: 86 MMHG | RESPIRATION RATE: 16 BRPM | OXYGEN SATURATION: 94 % | BODY MASS INDEX: 20.2 KG/M2 | SYSTOLIC BLOOD PRESSURE: 120 MMHG

## 2021-05-16 VITALS
SYSTOLIC BLOOD PRESSURE: 160 MMHG | HEART RATE: 69 BPM | HEIGHT: 58 IN | WEIGHT: 97 LBS | TEMPERATURE: 98.1 F | OXYGEN SATURATION: 96 % | DIASTOLIC BLOOD PRESSURE: 78 MMHG | BODY MASS INDEX: 20.36 KG/M2

## 2021-05-16 VITALS
HEART RATE: 86 BPM | WEIGHT: 101.37 LBS | RESPIRATION RATE: 16 BRPM | BODY MASS INDEX: 21.28 KG/M2 | HEIGHT: 58 IN | TEMPERATURE: 98.3 F | DIASTOLIC BLOOD PRESSURE: 78 MMHG | SYSTOLIC BLOOD PRESSURE: 180 MMHG | OXYGEN SATURATION: 97 %

## 2021-06-19 ENCOUNTER — APPOINTMENT (OUTPATIENT)
Dept: CT IMAGING | Facility: HOSPITAL | Age: 86
End: 2021-06-19

## 2021-06-19 ENCOUNTER — HOSPITAL ENCOUNTER (EMERGENCY)
Facility: HOSPITAL | Age: 86
Discharge: HOME | End: 2021-06-20
Attending: EMERGENCY MEDICINE | Admitting: EMERGENCY MEDICINE

## 2021-06-19 ENCOUNTER — APPOINTMENT (OUTPATIENT)
Dept: GENERAL RADIOLOGY | Facility: HOSPITAL | Age: 86
End: 2021-06-19

## 2021-06-19 DIAGNOSIS — S32.591A INFERIOR PUBIC RAMUS FRACTURE, RIGHT, CLOSED, INITIAL ENCOUNTER (HCC): Primary | ICD-10-CM

## 2021-06-19 PROCEDURE — 72125 CT NECK SPINE W/O DYE: CPT

## 2021-06-19 PROCEDURE — 99283 EMERGENCY DEPT VISIT LOW MDM: CPT

## 2021-06-19 PROCEDURE — 70450 CT HEAD/BRAIN W/O DYE: CPT

## 2021-06-19 PROCEDURE — 72192 CT PELVIS W/O DYE: CPT

## 2021-06-19 PROCEDURE — 73502 X-RAY EXAM HIP UNI 2-3 VIEWS: CPT

## 2021-06-19 RX ORDER — LANSOPRAZOLE 15 MG/1
15 CAPSULE, DELAYED RELEASE ORAL AS NEEDED
COMMUNITY
End: 2023-03-10 | Stop reason: SDUPTHER

## 2021-06-19 RX ORDER — CHLORAL HYDRATE 500 MG
1000 CAPSULE ORAL
COMMUNITY

## 2021-06-19 RX ORDER — MULTIPLE VITAMINS W/ MINERALS TAB 9MG-400MCG
1 TAB ORAL DAILY
COMMUNITY
End: 2023-03-10

## 2021-06-20 VITALS
TEMPERATURE: 98.5 F | HEART RATE: 96 BPM | RESPIRATION RATE: 20 BRPM | DIASTOLIC BLOOD PRESSURE: 97 MMHG | OXYGEN SATURATION: 95 % | HEIGHT: 62 IN | WEIGHT: 89.51 LBS | SYSTOLIC BLOOD PRESSURE: 142 MMHG | BODY MASS INDEX: 16.47 KG/M2

## 2021-06-20 RX ORDER — ACETAMINOPHEN 325 MG/1
650 TABLET ORAL ONCE
Status: DISCONTINUED | OUTPATIENT
Start: 2021-06-20 | End: 2021-06-20 | Stop reason: HOSPADM

## 2021-06-20 NOTE — ED PROVIDER NOTES
Subjective   Patient had been sitting outside talking to her neighbor sitting on her Rollator walker when she was leaving she took the break off and when she stood up the walker rolled backwards and she fell backwards onto her bottom and fell back hitting her head.  Patient denies LOC.  She is not on any blood thinner.  Complains of mild headache and slight pain in her neck when she turns her head from side to side as well as right hip pain      Fall  Mechanism of injury: fall    Injury location:  Head/neck and leg  Head/neck injury location:  Head  Leg injury location:  R hip  Incident location:  Outdoors  Time since incident:  1 hour  Arrived directly from scene: yes    Fall:     Fall occurred:  Standing    Height of fall:  2-3 ft    Impact surface:  [place]    Point of impact:  Buttocks    Entrapped after fall: no    Suspicion of alcohol use: no    Suspicion of drug use: no    Prior to arrival data:     Bystander interventions:  None    Patient ambulatory at scene: yes      Blood loss:  None    Orientation at scene:  Person, place, time and situation    Loss of consciousness: no      Amnesic to event: no      Airway interventions:  None    Mental status condition since incident:  Stable    Disability condition since incident:  Stable  Associated symptoms: headaches and neck pain    Associated symptoms: no back pain, no nausea, no seizures and no vomiting        Review of Systems   Gastrointestinal: Negative for nausea and vomiting.   Musculoskeletal: Positive for neck pain. Negative for back pain.   Neurological: Positive for headaches. Negative for seizures.       Past Medical History:   Diagnosis Date   • GERD (gastroesophageal reflux disease)        No Known Allergies    History reviewed. No pertinent surgical history.    History reviewed. No pertinent family history.    Social History     Socioeconomic History   • Marital status:      Spouse name: Not on file   • Number of children: Not on file   •  Years of education: Not on file   • Highest education level: Not on file   Tobacco Use   • Smoking status: Never Smoker   Substance and Sexual Activity   • Alcohol use: Yes     Comment: social   • Drug use: Never   • Sexual activity: Defer           Objective   Physical Exam  Vitals and nursing note reviewed.   Constitutional:       General: She is not in acute distress.     Appearance: Normal appearance. She is not toxic-appearing.   HENT:      Head: Normocephalic and atraumatic.      Comments: No obvious injury detected     Right Ear: Tympanic membrane, ear canal and external ear normal.      Left Ear: Tympanic membrane, ear canal and external ear normal.      Nose: Nose normal.      Mouth/Throat:      Mouth: Mucous membranes are moist.   Eyes:      Extraocular Movements: Extraocular movements intact.      Conjunctiva/sclera: Conjunctivae normal.      Pupils: Pupils are equal, round, and reactive to light.   Neck:      Comments: Mild pain in bilateral trapezius muscles with rotation of head.  No vertebral tenderness  Cardiovascular:      Rate and Rhythm: Normal rate and regular rhythm.      Pulses: Normal pulses.      Heart sounds: Normal heart sounds.   Pulmonary:      Effort: Pulmonary effort is normal.      Breath sounds: Normal breath sounds.   Abdominal:      General: Bowel sounds are normal.      Palpations: Abdomen is soft.      Tenderness: There is no abdominal tenderness.   Musculoskeletal:         General: Tenderness (right hip medial and lateral) present. No deformity. Normal range of motion.      Cervical back: No tenderness.      Right lower leg: No edema.      Left lower leg: No edema.   Skin:     General: Skin is warm and dry.   Neurological:      General: No focal deficit present.      Mental Status: She is alert and oriented to person, place, and time.      Sensory: No sensory deficit.      Motor: No weakness.   Psychiatric:         Mood and Affect: Mood normal.         Behavior: Behavior normal.          Thought Content: Thought content normal.         Judgment: Judgment normal.         Procedures           ED Course  ED Course as of Jun 20 0656   Sat Jun 19, 2021   2139 + pelvis fracture   XR Hip With or Without Pelvis 2 - 3 View Right [DS]   2307 Patient advised of her findings for pelvic fracture.  She is adamant she wishes to go home.  We will attempt to ambulate her here and if able to ambulate with walker she can go home as desired    [DS]   Sun Jun 20, 2021   0005 Patient was able to ambulate in the department with 1 person holding hand.  Patient reported mild pain.  Advised patient normally we admit for physical therapy and pain control with an acute pelvic fracture but patient is adamant she wishes to return home.  She states she can use her walker and get around fine    [DS]   0008 Right inferior rami fx only   CT Pelvis Without Contrast [DS]      ED Course User Index  [DS] Keisha Xiong APRN                                           [facility]    Final diagnoses:   Inferior pubic ramus fracture, right, closed, initial encounter (CMS/[facility])       ED Disposition  ED Disposition     ED Disposition Condition Comment    Discharge Stable           Yoselyn Godfrey MD  [address] [place] [id number] [phone]    In 2 days           Medication List      No changes were made to your prescriptions during this visit.          Keisha Xiong APRN  06/20/21 0656

## 2021-06-20 NOTE — DISCHARGE INSTRUCTIONS
Tylenol for pain  Ice  Ambulate with walker  Call pcp Monday for re-eval and possible home health order for PT/OT  Return to ED for new/worsening symptoms

## 2021-06-22 ENCOUNTER — OFFICE VISIT (OUTPATIENT)
Dept: INTERNAL MEDICINE | Facility: CLINIC | Age: 86
End: 2021-06-22

## 2021-06-22 ENCOUNTER — HOME HEALTH ADMISSION (OUTPATIENT)
Dept: HOME HEALTH SERVICES | Facility: HOME HEALTHCARE | Age: 86
End: 2021-06-22

## 2021-06-22 VITALS
DIASTOLIC BLOOD PRESSURE: 74 MMHG | WEIGHT: 89 LBS | HEIGHT: 62 IN | HEART RATE: 75 BPM | BODY MASS INDEX: 16.38 KG/M2 | SYSTOLIC BLOOD PRESSURE: 130 MMHG | TEMPERATURE: 98 F | OXYGEN SATURATION: 97 %

## 2021-06-22 DIAGNOSIS — R53.81 DEBILITY: ICD-10-CM

## 2021-06-22 DIAGNOSIS — S32.591A CLOSED FRACTURE OF RIGHT INFERIOR PUBIC RAMUS, INITIAL ENCOUNTER (HCC): Primary | ICD-10-CM

## 2021-06-22 DIAGNOSIS — E46 PROTEIN-CALORIE MALNUTRITION, UNSPECIFIED SEVERITY (HCC): ICD-10-CM

## 2021-06-22 DIAGNOSIS — M85.80 OSTEOPENIA, UNSPECIFIED LOCATION: ICD-10-CM

## 2021-06-22 DIAGNOSIS — R26.2 DIFFICULTY WALKING: ICD-10-CM

## 2021-06-22 DIAGNOSIS — Z91.81 AT HIGH RISK FOR FALLS: ICD-10-CM

## 2021-06-22 PROBLEM — E55.9 VITAMIN D DEFICIENCY: Status: ACTIVE | Noted: 2021-06-22

## 2021-06-22 PROBLEM — R51.9 PAIN, HEAD: Status: ACTIVE | Noted: 2021-06-22

## 2021-06-22 PROBLEM — H92.09 OTALGIA: Status: ACTIVE | Noted: 2021-06-22

## 2021-06-22 PROBLEM — M41.9 SCOLIOSIS: Status: ACTIVE | Noted: 2020-05-04

## 2021-06-22 PROBLEM — K21.9 GASTROESOPHAGEAL REFLUX: Status: ACTIVE | Noted: 2021-06-22

## 2021-06-22 PROBLEM — I21.9 MYOCARDIAL INFARCTION: Status: ACTIVE | Noted: 2021-06-22

## 2021-06-22 PROCEDURE — 99214 OFFICE O/P EST MOD 30 MIN: CPT | Performed by: STUDENT IN AN ORGANIZED HEALTH CARE EDUCATION/TRAINING PROGRAM

## 2021-06-22 RX ORDER — CHLORAL HYDRATE 500 MG
CAPSULE ORAL
COMMUNITY
End: 2021-10-27

## 2021-06-22 RX ORDER — ASPIRIN 81 MG/1
TABLET ORAL
COMMUNITY

## 2021-06-22 RX ORDER — DIMENHYDRINATE 50 MG
TABLET ORAL
COMMUNITY

## 2021-06-22 NOTE — ASSESSMENT & PLAN NOTE
Chronic with recent exacerbation in light of recent hip fracture.  Continue will to use as needed.  Home health and PT assessment ordered.

## 2021-06-22 NOTE — ASSESSMENT & PLAN NOTE
Acute, post recent fall.  Urgent referral to orthopedic and PT placed today.  She is to continue with OTC ibuprofen versus Tylenol for pain control.

## 2021-06-22 NOTE — PATIENT INSTRUCTIONS
Hip Fracture    A hip fracture is a break in the upper part of the thigh bone (femur). This is usually the result of an injury, commonly a fall.  What are the causes?  This condition may be caused by:  · A direct hit or injury (trauma) to the side of the hip, such as from a fall or a car accident.  What increases the risk?  You are more likely to develop this condition if:  · You have poor balance or an unsteady walking pattern (gait). Certain conditions contribute to poor balance, including Parkinson disease and dementia.  · You have thinning or weakening of your bones, such as from osteopenia or osteoporosis.  · You have cancer that spreads to the leg bones.  · You have certain conditions that can weaken your bones, such as thyroid disorders, intestine disorders, or a lack (deficiency) of certain nutrients.  · You smoke.  · You take certain medicines, such as steroids.  · You have a history of broken bones.  What are the signs or symptoms?  Symptoms of this condition include:  · Pain over the injured hip. This is commonly felt on the side of the hip or in the front groin area.  · Stiffness, bruising, and swelling over the hip.  · Pain with movement of the leg, especially lifting it up. Pain often gets better with rest.  · Difficulty or inability to stand, walk, or use the leg to support body weight (put weight on the leg).  · The leg rolling outward when lying down.  · The affected leg being shorter than the other leg.  How is this diagnosed?  This condition may be diagnosed based on:  · Your symptoms.  · A physical exam.  · X-rays. These may be done:  ? To confirm the diagnosis.  ? To determine the type and location of the fracture.  ? To check for other injuries.  · MRI or CT scans. These may be done if the fracture is not visible on an X-ray.  How is this treated?  Treatment for this condition depends on the severity and location of your fracture. In most cases, surgery is necessary. Surgery may  involve:  · Repairing the fracture with a screw, nail, or neil to hold the bone in place (open reduction and internal fixation, ORIF).  · Replacing the damaged parts of the femur with metal implants (hemiarthroplasty or arthroplasty).  If your fracture is less severe, or if you are not eligible for surgery, you may have non-surgical treatment. Non-surgical treatment may involve:  · Using crutches, a walker, or a wheelchair until your health care provider says that you can support (bear) weight on your hip.  · Medicines to help reduce pain and swelling.  · Having regular X-rays to monitor your fracture and make sure that it is healing.  · Physical therapy. You may need physical therapy after surgery, too.  Follow these instructions at home:  Activity  · Do not use your injured leg to support your body weight until your health care provider says that you can.  ? Follow standing and walking restrictions as told by your health care provider.  ? Use crutches, a walker, or a wheelchair as directed.  · Avoid any activities that cause pain or irritation in your hip. Ask your health care provider what activities are safe for you.  · Do not drive or use heavy machinery until your health care provider approves.  · If physical therapy was prescribed, do exercises as told by your health care provider.  General instructions  · Take over-the-counter and prescription medicines only as told by your health care provider.  · If directed, put ice on the injured area:  ? Put ice in a plastic bag.  ? Place a towel between your skin and the bag.  ? Leave the ice on for 20 minutes, 2-3 times a day.  · Do not use any products that contain nicotine or tobacco, such as cigarettes and e-cigarettes. These can delay bone healing. If you need help quitting, ask your health care provider.  · Keep all follow-up visits as told by your health care provider. This is important.  How is this prevented?  · To prevent falls at home:  ? Use a cane, walker,  or wheelchair as directed.  ? Make sure your rooms and hallways are free of clutter, obstacles, and cords.  ? Install grab bars in your bedroom and bathrooms.  ? Always use handrails when going up and down stairs.  ? Use nightlights around the house.  · Exercise regularly. Ask what forms of exercise are safe for you, such as walking and strength and balance exercises.  · Visit an eye doctor regularly to have your eyesight checked. This can help prevent falls.  · Make sure you get enough calcium and vitamin D.  · Do not use any products that contain nicotine or tobacco, such as cigarettes and e-cigarettes. If you need help quitting, ask your health care provider.  · Limit alcohol use.  · If you have an underlying condition that caused your hip fracture, work with your health care provider to manage your condition.  Contact a health care provider if:  · Your pain gets worse or it does not get better with rest or medicine.  · You develop any of the following in your leg or foot:  ? Numbness.  ? Tingling.  ? A change in skin color (discoloration).  ? Skin feeling cold to the touch.  Get help right away if:  · Your pain suddenly gets worse.  · You cannot move your hip.  Summary  · A hip fracture is a break in the upper part of the thigh bone (femur).  · Treatment typically require surgical management to restore stability and function to the hip.  · Pain medicine and icing of the affected leg can help manage pain and swelling. Follow directions as told by your health care provider.  This information is not intended to replace advice given to you by your health care provider. Make sure you discuss any questions you have with your health care provider.  Document Revised: 09/07/2019 Document Reviewed: 01/20/2018  [employer] Patient Education © 2021 [employer]      Fall Prevention in the Home, Adult  Falls can cause injuries and can affect people from all age groups. There are many simple things that you can do to make your  home safe and to help prevent falls. Ask for help when making these changes, if needed.  What actions can I take to prevent falls?  General instructions  · Use good lighting in all rooms. Replace any light bulbs that burn out.  · Turn on lights if it is dark. Use night-lights.  · Place frequently used items in easy-to-reach places. Lower the shelves around your home if necessary.  · Set up furniture so that there are clear paths around it. Avoid moving your furniture around.  · Remove throw rugs and other tripping hazards from the floor.  · Avoid walking on wet floors.  · Fix any uneven floor surfaces.  · Add color or contrast paint or tape to grab bars and handrails in your home. Place contrasting color strips on the first and last steps of stairways.  · When you use a stepladder, make sure that it is completely opened and that the sides are firmly locked. Have someone hold the ladder while you are using it. Do not climb a closed stepladder.  · Be aware of any and all pets.  What can I do in the bathroom?         · Keep the floor dry. Immediately clean up any water that spills onto the floor.  · Remove soap buildup in the tub or shower on a regular basis.  · Use non-skid mats or decals on the floor of the tub or shower.  · Attach bath mats securely with double-sided, non-slip rug tape.  · If you need to sit down while you are in the shower, use a plastic, non-slip stool.  · Install grab bars by the toilet and in the tub and shower. Do not use towel bars as grab bars.  What can I do in the bedroom?  · Make sure that a bedside light is easy to reach.  · Do not use oversized bedding that drapes onto the floor.  · Have a firm chair that has side arms to use for getting dressed.  What can I do in the kitchen?  · Clean up any spills right away.  · If you need to reach for something above you, use a sturdy step stool that has a grab bar.  · Keep electrical cables out of the way.  · Do not use floor polish or wax that  makes floors slippery. If you must use wax, make sure that it is non-skid floor wax.  What can I do in the stairways?  · Do not leave any items on the stairs.  · Make sure that you have a light switch at the top of the stairs and the bottom of the stairs. Have them installed if you do not have them.  · Make sure that there are handrails on both sides of the stairs. Fix handrails that are broken or loose. Make sure that handrails are as long as the stairways.  · Install non-slip stair treads on all stairs in your home.  · Avoid having throw rugs at the top or bottom of stairways, or secure the rugs with carpet tape to prevent them from moving.  · Choose a carpet design that does not hide the edge of steps on the stairway.  · Check any carpeting to make sure that it is firmly attached to the stairs. Fix any carpet that is loose or worn.  What can I do on the outside of my home?  · Use bright outdoor lighting.  · Regularly repair the edges of walkways and driveways and fix any cracks.  · Remove high doorway thresholds.  · Trim any shrubbery on the main path into your home.  · Regularly check that handrails are securely fastened and in good repair. Both sides of any steps should have handrails.  · Install guardrails along the edges of any raised decks or porches.  · Clear walkways of debris and clutter, including tools and rocks.  · Have leaves, snow, and ice cleared regularly.  · Use sand or salt on walkways during winter months.  · In the garage, clean up any spills right away, including grease or oil spills.  What other actions can I take?  · Wear closed-toe shoes that fit well and support your feet. Wear shoes that have rubber soles or low heels.  · Use mobility aids as needed, such as canes, walkers, scooters, and crutches.  · Review your medicines with your health care provider. Some medicines can cause dizziness or changes in blood pressure, which increase your risk of falling.  Talk with your health care  provider about other ways that you can decrease your risk of falls. This may include working with a physical therapist or  to improve your strength, balance, and endurance.  Where to find more information  · Centers for Disease Control and Prevention, [place]: https://www.cdc.gov  · National [place] [employer] Aging: https://ha4vrbq.helio.nih.gov  Contact a health care provider if:  · You are afraid of falling at home.  · You feel weak, drowsy, or dizzy at home.  · You fall at home.  Summary  · There are many simple things that you can do to make your home safe and to help prevent falls.  · Ways to make your home safe include removing tripping hazards and installing grab bars in the bathroom.  · Ask for help when making these changes in your home.  This information is not intended to replace advice given to you by your health care provider. Make sure you discuss any questions you have with your health care provider.  Document Revised: 11/30/2018 Document Reviewed: 08/02/2018  Elsevier Patient Education © 2021 [employer]

## 2021-06-22 NOTE — PROGRESS NOTES
Chief Complaint  Follow-up (Fell, Pelvic fracture)    Subjective            Emi Ball presents to [facility]  History of Present Illness    Hip fracture:  Patient presenting for ED visit follow-up.  She was evaluated in the ER on Saturday, June 19th post fall with mild head trauma without LOC.    Patient that she was attempting to use her walker when she unexpectedly started sliding backward and fell on her buttocks hitting her head.  Evaluation in the ER was concerning for a tear of the right inferior pubic ramus.  There is conflicting reports between documentation from ED visit and patient reports as far as expected plan.  Per patient she was shocked to have been discharged home, per ED report she was adamant about going home, regardless patient today reports persistent pain over the right hip specifically over the medial aspect of her thigh as well as over her buttock.  States that she is having some difficult times carrying out ADLs as she has significant pain with prolonged standing.  Getting in her bed is also difficult.  She is much more comfortable in a recliner.  She denies any weakness of her leg.  She was brought in by a friend and is in a wheelchair today due to pain and discomfort with walking.    Patient stated that she would benefit from some assistance at home as she lives alone in an assisted senior living facility.  She is interested in pursuing physical therapy and would like referral for orthopedic as well.      Past Medical History:   Diagnosis Date   • GERD (gastroesophageal reflux disease)    • Myocardial infarction (CMS/[facility])    • Otalgia    • Pain head    • Scoliosis 05/04/2020   • Vitamin D deficiency        Allergies:   Allergies   Allergen Reactions   • Iodinated Diagnostic Agents Unknown - High Severity   • Iodine Unknown - High Severity   • [place] Unknown - High Severity          History reviewed. No pertinent surgical history.        Social History     Socioeconomic History   • Marital status:      Spouse name: Not on file   • Number of children: Not on file   • Years of education: Not on file   • Highest education level: Not on file   Tobacco Use   • Smoking status: Never Smoker   • Smokeless tobacco: Never Used   Substance and Sexual Activity   • Alcohol use: Yes     Comment: social   • Drug use: Never   • Sexual activity: Defer         Family History   Problem Relation Age of Onset   • Heart attack Other           Health Maintenance Due   Topic Date Due   • DXA SCAN  Never done   • TDAP/TD VACCINES (1 - Tdap) Never done   • ZOSTER VACCINE (1 of 2) Never done   • Pneumococcal Vaccine 65+ (1 of 1 - PPSV23) Never done   • ANNUAL WELLNESS VISIT  Never done            Current Outpatient Medications:   •  aspirin (aspirin) 81 MG EC tablet, , Disp: , Rfl:   •  Calcium Carb-Cholecalciferol (Caltrate 600+D3 Soft) 600-800 MG-UNIT chewable tablet, , Disp: , Rfl:   •  Coenzyme Q10 (Co Q-10) 100 MG capsule, , Disp: , Rfl:   •  lansoprazole (PREVACID) 15 MG capsule, Take 15 mg by mouth As Needed., Disp: , Rfl:   •  Multiple Vitamins-Minerals (MULTIVITAMIN ADULT EXTRA C PO), multivitamin Oral Tablet take 1 tablet by oral route once daily with food   Active, Disp: , Rfl:   •  multivitamin with minerals tablet tablet, Take 1 tablet by mouth Daily., Disp: , Rfl:   •  Omega-3 Fatty Acids (fish oil) 1000 MG capsule capsule, Take 1,000 mg by mouth Daily With Breakfast., Disp: , Rfl:   •  Omega-3 Fatty Acids (fish oil) 1000 MG capsule capsule, , Disp: , Rfl:   •  vitamin D3 125 MCG (5000 UT) capsule capsule, Take 5,000 Units by mouth Daily., Disp: , Rfl:       Immunization History   Administered Date(s) Administered   • COVID-19 (MODERNA) 02/22/2021, 03/26/2021   • Fluzone High Dose =>65 Years ([employer] ONLY) 09/21/2020         Review of Systems   Per HPI    Objective       Vitals:    06/22/21 1116   BP: 130/74   Pulse: 75   Temp: 98 °F (36.7 °C)  "  SpO2: 97%   Weight: 40.4 kg (89 lb)   Height: 157.5 cm (62.01\")     Body mass index is 16.27 kg/m².      Physical Exam  Vitals reviewed.   Constitutional:       Appearance: Normal appearance.   HENT:      Head: Normocephalic and atraumatic.      Nose: Nose normal.   Eyes:      Extraocular Movements: Extraocular movements intact.      Conjunctiva/sclera: Conjunctivae normal.   Pulmonary:      Effort: Pulmonary effort is normal. No respiratory distress.   Musculoskeletal:      Right lower leg: Tenderness (Tenderness over the lateral aspect of her right hip as well as over the right ischeal spine.  Overlying bruising or ecchymosis, or swelling noted.) present.      Comments: Range of motion is limited secondary to pain more so with flexion at the hip.  She exhibits normal strength 4/5 with flexion and extension of the knee.   Skin:     General: Skin is warm and dry.   Neurological:      General: No focal deficit present.      Mental Status: She is alert and oriented to person, place, and time.      Cranial Nerves: No cranial nerve deficit.   Psychiatric:         Mood and Affect: Mood normal.         Behavior: Behavior normal.         Thought Content: Thought content normal.             Result Review :                           Assessment and Plan      Diagnoses and all orders for this visit:    1. Closed fracture of right inferior pubic ramus, initial encounter (CMS/[facility]) (Primary)  Assessment & Plan:  Acute, post recent fall.  Urgent referral to orthopedic and PT placed today.  She is to continue with OTC ibuprofen versus Tylenol for pain control.    Orders:  -     Ambulatory Referral to Orthopedic Surgery  -     Ambulatory Referral to Home Health  -     Standard Wheelchair    2. Debility  Assessment & Plan:  Chronic with concern for protein calorie malnutrition with BMI noted to be 16.  She is at high risk for worsening of this given her current limitations with her ADLs.    Orders:  -     Ambulatory Referral to " Orthopedic Surgery  -     Ambulatory Referral to Home Health  -     Standard Wheelchair    3. Osteopenia, unspecified location  -     Ambulatory Referral to Home Health  -     Standard Wheelchair    4. Protein-calorie malnutrition, unspecified severity (CMS/HCC)  -     Ambulatory Referral to Home Health    5. At high risk for falls  -     Ambulatory Referral to Home Health  -     Standard Wheelchair    6. Difficulty walking  Assessment & Plan:  Chronic with recent exacerbation in light of recent hip fracture.  Continue will to use as needed.  Home health and PT assessment ordered.    Orders:  -     Ambulatory Referral to Home Health  -     Standard Wheelchair                Follow Up     Return in about 6 weeks (around 8/3/2021) for hip fracture. .    Patient was given instructions and counseling regarding her condition or for health maintenance advice. Please see specific information pulled into the AVS if appropriate.     Doris Anthony MD   Internal Medicine-Pediatrics

## 2021-06-22 NOTE — ASSESSMENT & PLAN NOTE
Chronic with concern for protein calorie malnutrition with BMI noted to be 16.  She is at high risk for worsening of this given her current limitations with her ADLs.

## 2021-06-25 ENCOUNTER — TELEPHONE (OUTPATIENT)
Dept: ORTHOPEDIC SURGERY | Facility: CLINIC | Age: 86
End: 2021-06-25

## 2021-06-28 ENCOUNTER — TELEPHONE (OUTPATIENT)
Dept: INTERNAL MEDICINE | Facility: CLINIC | Age: 86
End: 2021-06-28

## 2021-07-06 NOTE — TELEPHONE ENCOUNTER
Discussed with Sherley the PT with [facility] she will discuss with patient due to our office not being able reach patient. Motrin as the first option and possibly something stronger if needed.

## 2021-07-09 ENCOUNTER — TELEPHONE (OUTPATIENT)
Dept: INTERNAL MEDICINE | Facility: CLINIC | Age: 86
End: 2021-07-09

## 2021-07-09 NOTE — TELEPHONE ENCOUNTER
Caller: [facility]    Relationship: HOME HEALTH NURSE    Best call back number: [phone]    What orders are you requesting (i.e. lab or imaging): EXTENSION OF CURRENT HOME HEALTH ORDERS    In what timeframe would the patient need to come in: ASAP    Where will you receive your lab/imaging services: IN HOME    Additional notes:

## 2021-07-14 ENCOUNTER — OFFICE VISIT (OUTPATIENT)
Dept: INTERNAL MEDICINE | Facility: CLINIC | Age: 86
End: 2021-07-14

## 2021-07-14 VITALS
BODY MASS INDEX: 15.18 KG/M2 | OXYGEN SATURATION: 97 % | WEIGHT: 83 LBS | TEMPERATURE: 98 F | HEART RATE: 82 BPM | SYSTOLIC BLOOD PRESSURE: 144 MMHG | DIASTOLIC BLOOD PRESSURE: 72 MMHG

## 2021-07-14 DIAGNOSIS — S32.591D CLOSED FRACTURE OF RIGHT INFERIOR PUBIC RAMUS WITH ROUTINE HEALING, SUBSEQUENT ENCOUNTER: Primary | ICD-10-CM

## 2021-07-14 DIAGNOSIS — M41.20 OTHER IDIOPATHIC SCOLIOSIS, UNSPECIFIED SPINAL REGION: ICD-10-CM

## 2021-07-14 PROCEDURE — 99213 OFFICE O/P EST LOW 20 MIN: CPT | Performed by: INTERNAL MEDICINE

## 2021-07-14 RX ORDER — TRIAMCINOLONE ACETONIDE 5 MG/G
OINTMENT TOPICAL 2 TIMES DAILY
Qty: 15 G | Refills: 0 | Status: SHIPPED | OUTPATIENT
Start: 2021-07-14

## 2021-07-14 RX ORDER — FAMOTIDINE 40 MG/5ML
20 POWDER, FOR SUSPENSION ORAL 2 TIMES DAILY
Qty: 50 ML | Refills: 0 | Status: SHIPPED | OUTPATIENT
Start: 2021-07-14 | End: 2022-09-15

## 2021-07-14 NOTE — PROGRESS NOTES
Chief Complaint  Fall (right side pelvis fx june 19th)    Subjective          Emi Ball presents to [facility]  History of Present Illness    Was at home  Fell from her walker and fractured her pelvis  She is working with physical therapy  Pain is controlled currently  Reviewed ER notes    Slight constipation  Taking mirlax    Bug bite of left arm    Dry cough  No coughing anythig up    Overall feeling well      Objective   Vital Signs:   /72 (BP Location: Left arm, Patient Position: Sitting, Cuff Size: Pediatric)   Pulse 82   Temp 98 °F (36.7 °C) (Temporal)   Wt 37.6 kg (83 lb)   SpO2 97%   BMI 15.18 kg/m²     Physical Exam   Result Review :   Rrr, no m/r/g  CTAB, moving air well, no rhonchi  Sitting in wheelchair  Not ambulatory currently      Common labs    Common Labsle 3/17/21 3/17/21 3/17/21    1525 1525 1525   Glucose  112 (A)    BUN  16    Creatinine  0.47 (A)    Sodium  145    Potassium  4.3    Chloride  102    Calcium  10.0    Albumin  4.4    Total Bilirubin  0.62    Alkaline Phosphatase  68    AST (SGOT)  26    ALT (SGPT)  19    WBC 5.36     Hemoglobin 14.2     Hematocrit 44.6     Platelets 253     Total Cholesterol   259 (A)   Triglycerides   81   HDL Cholesterol   97 (A)   LDL Cholesterol    146 (A)   (A) Abnormal value       Comments are available for some flowsheets but are not being displayed.              Procedures      Assessment and Plan    Diagnoses and all orders for this visit:    1. Closed fracture of right inferior pubic ramus with routine healing, subsequent encounter (Primary)  Comments:  cont to work with PT and ortho  Orders:  -     Ambulatory Referral to Home Health    2. Other idiopathic scoliosis, unspecified spinal region  Comments:  currently not interested in discussing with anyone    Other orders  -     triamcinolone (KENALOG) 0.5 % ointment; Apply  topically to the appropriate area as directed 2 (Two) Times a Day.   Dispense: 15 g; Refill: 0  -     famotidine (PEPCID) 40 MG/5ML suspension; Take 2.5 mL by mouth 2 (Two) Times a Day.  Dispense: 50 mL; Refill: 0        Follow Up   Return in about 6 weeks (around 8/25/2021).  Patient was given instructions and counseling regarding her condition or for health maintenance advice. Please see specific information pulled into the AVS if appropriate.

## 2021-07-16 ENCOUNTER — TELEPHONE (OUTPATIENT)
Dept: INTERNAL MEDICINE | Facility: CLINIC | Age: 86
End: 2021-07-16

## 2021-07-16 NOTE — TELEPHONE ENCOUNTER
Caller: keke    Relationship: HOME HEALTH    Best call back number: [phone]    What is the best time to reach you: ANY    Who are you requesting to speak with (clinical staff, provider,  specific staff member): PROVIDER/ CLINICAL    What was the call regarding: HOME HEALTH IS NEEDING AN OKAY TO DISCONTINUE OCCUPATIONAL THERAPY.     Do you require a callback: YES

## 2021-07-20 ENCOUNTER — TELEPHONE (OUTPATIENT)
Dept: INTERNAL MEDICINE | Facility: CLINIC | Age: 86
End: 2021-07-20

## 2021-07-22 ENCOUNTER — TELEPHONE (OUTPATIENT)
Dept: INTERNAL MEDICINE | Facility: CLINIC | Age: 86
End: 2021-07-22

## 2021-07-22 NOTE — TELEPHONE ENCOUNTER
Caller: Lizzy Emi GRIER    Relationship: Self    Best call back number: [phone]      What was the call regarding: PATIENT SAYS SHE HAS CALLED SEVERAL TIMES IN REGARDS TO GETTING AN EXTENSION FOR HER TOILET. SAID WAS TOLD ONE HAD BEEN ORDERED BUT HASN'T RECEIVED IT.  SAID TO LEAVE MESSAGE IF SHE DOESN'T ANSWER TAKES HER A FEW MINUTES TO GET TO PHONE. PLEASE ADVISE     Do you require a callback: YES

## 2021-07-23 NOTE — TELEPHONE ENCOUNTER
Called patient and she doesn't have a specific place of where she wants to get this, just wherever she can get one. One ordered in system to be printed out and faxed to DME company of choice.  Please review.

## 2021-07-26 ENCOUNTER — OFFICE VISIT (OUTPATIENT)
Dept: ORTHOPEDIC SURGERY | Facility: CLINIC | Age: 86
End: 2021-07-26

## 2021-07-26 ENCOUNTER — TELEPHONE (OUTPATIENT)
Dept: INTERNAL MEDICINE | Facility: CLINIC | Age: 86
End: 2021-07-26

## 2021-07-26 VITALS — HEIGHT: 61 IN | OXYGEN SATURATION: 99 % | HEART RATE: 88 BPM | BODY MASS INDEX: 15.68 KG/M2

## 2021-07-26 DIAGNOSIS — S32.591D CLOSED FRACTURE OF RIGHT INFERIOR PUBIC RAMUS WITH ROUTINE HEALING, SUBSEQUENT ENCOUNTER: Primary | ICD-10-CM

## 2021-07-26 PROCEDURE — 99203 OFFICE O/P NEW LOW 30 MIN: CPT | Performed by: ORTHOPAEDIC SURGERY

## 2021-07-26 NOTE — PROGRESS NOTES
"Chief Complaint  Pain of the Pelvis     Subjective      Emi Ball presents to [facility] for an evaluation of the pelvis. Patients walker wasn't secure when she had fallen and injured her pelvis. She is present in a wheelchair. She states she has gotten a little bit better since her fall. She states she still can't stand up. She has therapy coming to her home. Patient sustained a pelvic fracture on 6/19/21. She states she has trouble with getting on the commode because it is too low.     Allergies   Allergen Reactions   • Iodinated Diagnostic Agents Unknown - High Severity   • Iodine Unknown - High Severity   • [place] Unknown - High Severity        Social History     Socioeconomic History   • Marital status:      Spouse name: Not on file   • Number of children: Not on file   • Years of education: Not on file   • Highest education level: Not on file   Tobacco Use   • Smoking status: Never Smoker   • Smokeless tobacco: Never Used   Substance and Sexual Activity   • Alcohol use: Yes     Comment: social   • Drug use: Never   • Sexual activity: Defer        Review of Systems     Objective   Vital Signs:   Pulse 88   Ht 154.9 cm (61\")   SpO2 99%   BMI 15.68 kg/m²       Physical Exam  Constitutional:       Appearance: Normal appearance. He is well-developed and normal weight.   HENT:      Head: Normocephalic.      Right Ear: Hearing and external ear normal.      Left Ear: Hearing and external ear normal.      Nose: Nose normal.   Eyes:      Conjunctiva/sclera: Conjunctivae normal.   Cardiovascular:      Rate and Rhythm: Normal rate.   Pulmonary:      Effort: Pulmonary effort is normal.      Breath sounds: No wheezing or rales.   Abdominal:      Palpations: Abdomen is soft.      Tenderness: There is no abdominal tenderness.   Musculoskeletal:      Cervical back: Normal range of motion.   Skin:     Findings: No rash.   Neurological:      Mental Status: He is alert and " oriented to person, place, and time.   Psychiatric:         Mood and Affect: Mood and affect normal.         Judgment: Judgment normal.       Ortho Exam      PELVIS: Sensation grossly intact. Neurovascular intact. Skin intact. Mild tenderness of hip and pelvic muscles. Ambulation with wheelchair. Good tone of hip flexors, hip extensors, hip adductor, hip abductors. No swelling or skin discoloration. Dorsal Pedal Pulse 2+, posteriror tibialis pulse 2+. Calf supple, non-tender.       Procedures        Imaging Results (Most Recent)     None           Result Review :       PROCEDURE:  CT PELVIS WO CONTRAST     COMPARISON: [facility], CR, XR HIP W OR WO PELVIS 2-3 VIEW RIGHT, 6/19/2021, 20:46.     INDICATIONS:  RIGHT HIP PAIN POST FALL TODAY, ABNORMAL XRAY     PROTOCOL:     Standard imaging protocol performed                 RADIATION:                     Automated exposure control was utilized to minimize radiation dose.      TECHNIQUE:    After obtaining the patient's consent, CT images were created without intravenous   contrast.  Multiplanar imaging was performed.     FINDINGS:          Bones are diffusely osteopenic.  Redemonstration of an acute fracture involving the right inferior   pubic ramus.  A discrete fracture at the right superior pubic ramus is not identified.  The hips   are maintained in alignment.  There is no proximal femoral fracture.  The left superior and   inferior pubic rami are intact.  Negative for sacral fracture.  Disc disease and facet arthropathy   partially imaged in the lower lumbar spine.  Convex left lumbar levoscoliosis.  Multiple gallstones   noted.  Extensive atherosclerotic calcifications of the aortoiliac vasculature.  No free fluid in   the pelvis.  Coarse calcifications noted at the right adnexa with a small 10 mm cyst.  No left   adnexal mass.  There is extensive diverticulosis involving the colon without diverticulitis.  Mild   asymmetric enlargement of the right  adductor musculature which may relate to the intramuscular   hematoma.  No intrapelvic hemorrhage.     CONCLUSION:   1. Nondisplaced fracture at the right inferior pubic ramus.  2. Negative for right proximal femoral fracture.  3. Diffuse osteopenia.  4. Additional chronic findings above.         Assessment and Plan     DX: Right inferior pubic ramus fracture     Discussed treatment plans and diagnosis with the patient. Patient denies X-rays today. Patient will continue therapy. We will work on getting a higher commode. She is advised to continue to try using her walker.     Call or return if worsening symptoms.    Follow Up     PRN.       Patient was given instructions and counseling regarding her condition or for health maintenance advice. Please see specific information pulled into the AVS if appropriate.     Scribed for Nikunj Sumner MD by Yessi Roche.  07/26/21   13:33 EDT    I have personally performed the services described in this document as scribed by the above individual and it is both accurate and complete. Nikunj Sumner MD 07/28/21

## 2021-07-26 NOTE — TELEPHONE ENCOUNTER
Caller: [facility], [place] - [address] AT [facility] [address] - [phone]  - [phone] FX    Relationship: Pharmacy    Best call back number: [phone]    What is the best time to reach you: ANY    Who are you requesting to speak with (clinical staff, provider,  specific staff member): CLINICAL    Do you know the name of the person who called: YOVANI     What was the call regarding: YOVANI STATES THEY RECEIVED A REQUEST TO FILL FOR A BED SIDE TOILET FOR THE PATIENT. YOVANI WANTED TO LET DR. WALL KNOW THAT THEY CANNOT FILL THAT AT THEIR LOCATION     Do you require a callback: NO

## 2021-07-28 NOTE — TELEPHONE ENCOUNTER
Do you know if it would be better to fill out a DME form and send to [facility] or someplace like that?

## 2021-08-18 ENCOUNTER — OFFICE VISIT (OUTPATIENT)
Dept: INTERNAL MEDICINE | Facility: CLINIC | Age: 86
End: 2021-08-18

## 2021-08-18 VITALS
BODY MASS INDEX: 15.48 KG/M2 | HEIGHT: 61 IN | HEART RATE: 81 BPM | OXYGEN SATURATION: 95 % | WEIGHT: 82 LBS | SYSTOLIC BLOOD PRESSURE: 128 MMHG | DIASTOLIC BLOOD PRESSURE: 90 MMHG | TEMPERATURE: 98.8 F

## 2021-08-18 DIAGNOSIS — Z74.09 MOBILITY IMPAIRED: Primary | ICD-10-CM

## 2021-08-18 DIAGNOSIS — S32.591D CLOSED FRACTURE OF RIGHT INFERIOR PUBIC RAMUS WITH ROUTINE HEALING, SUBSEQUENT ENCOUNTER: ICD-10-CM

## 2021-08-18 DIAGNOSIS — Z91.81 AT HIGH RISK FOR FALLS: ICD-10-CM

## 2021-08-18 DIAGNOSIS — E55.9 VITAMIN D DEFICIENCY: ICD-10-CM

## 2021-08-18 PROCEDURE — 99213 OFFICE O/P EST LOW 20 MIN: CPT | Performed by: INTERNAL MEDICINE

## 2021-08-18 NOTE — PROGRESS NOTES
"Chief Complaint  follow up right hip, skin is peeling, discuss physical therapy, discuss calcium supplement, and fatigue    Subjective          Emi Ball presents to [facility]  History of Present Illness    Fracture-  She feels like it is healing very slowly  She has done some physical therapy    Stress-  States that she is getting depressed  Her son flys home when he can  But it is hard for her to move   Thinking about an assisted living facility   Difficulty staying at home    Reflux-  Only taking it if she really needs it    Skin has been peeling  However she has not been able to bathe regularly          Objective   Vital Signs:   /90   Pulse 81   Temp 98.8 °F (37.1 °C)   Ht 154.9 cm (61\")   Wt 37.2 kg (82 lb)   SpO2 95%   BMI 15.49 kg/m²     Physical Exam  Vitals reviewed.   Constitutional:       Appearance: Normal appearance. She is well-developed.   HENT:      Head: Normocephalic and atraumatic.      Right Ear: External ear normal.      Left Ear: External ear normal.      Mouth/Throat:      Pharynx: No oropharyngeal exudate.   Eyes:      Conjunctiva/sclera: Conjunctivae normal.      Pupils: Pupils are equal, round, and reactive to light.   Cardiovascular:      Rate and Rhythm: Normal rate and regular rhythm.      Heart sounds: No murmur heard.   No friction rub. No gallop.    Pulmonary:      Effort: Pulmonary effort is normal.      Breath sounds: Normal breath sounds. No wheezing or rhonchi.   Skin:     General: Skin is warm and dry.   Neurological:      Mental Status: She is alert and oriented to person, place, and time.      Cranial Nerves: No cranial nerve deficit.   Psychiatric:         Mood and Affect: Affect normal.         Behavior: Behavior normal.         Thought Content: Thought content normal.        Result Review :          Procedures      Assessment and Plan    Diagnoses and all orders for this visit:    1. Mobility impaired " (Primary)  Comments:  Continue to work with physical therapy  We will also place referral for social work to help get her the services she needs  Orders:  -     Ambulatory Referral to Social Care Services (Amb Case Mgmt)  -     Ambulatory Referral to Home Health  -     Vitamin D 25 hydroxy; Future  -     CBC w AUTO Differential; Future  -     Comprehensive metabolic panel; Future  -     Vitamin D 25 hydroxy  -     CBC w AUTO Differential  -     Comprehensive metabolic panel    2. At high risk for falls  -     Ambulatory Referral to Social Care Services (Amb Case Mgmt)  -     Ambulatory Referral to Home Health  -     Vitamin D 25 hydroxy; Future  -     CBC w AUTO Differential; Future  -     Comprehensive metabolic panel; Future  -     Vitamin D 25 hydroxy  -     CBC w AUTO Differential  -     Comprehensive metabolic panel    3. Closed fracture of right inferior pubic ramus with routine healing, subsequent encounter  Comments:  Continue to allow to heal on its own  Orders:  -     Ambulatory Referral to Social Care Services (Amb Case Mgmt)  -     Ambulatory Referral to Home Health  -     Vitamin D 25 hydroxy; Future  -     CBC w AUTO Differential; Future  -     Comprehensive metabolic panel; Future  -     Vitamin D 25 hydroxy  -     CBC w AUTO Differential  -     Comprehensive metabolic panel    4. Vitamin D deficiency  -     Vitamin D 25 hydroxy; Future  -     Vitamin D 25 hydroxy        Follow Up   No follow-ups on file.  Patient was given instructions and counseling regarding her condition or for health maintenance advice. Please see specific information pulled into the AVS if appropriate.

## 2021-08-19 ENCOUNTER — PATIENT OUTREACH (OUTPATIENT)
Dept: CASE MANAGEMENT | Facility: OTHER | Age: 86
End: 2021-08-19

## 2021-08-19 ENCOUNTER — REFERRAL TRIAGE (OUTPATIENT)
Dept: CASE MANAGEMENT | Facility: OTHER | Age: 86
End: 2021-08-19

## 2021-08-19 NOTE — OUTREACH NOTE
ASSESSMENT    Staff Spoke With: MSW spoke with patient to discuss resources needed at this time.     Reason for the Referral: Additional Care Services and Transportation Needs    Patient's Reported Cognitive Status: Alert and Oriented    Does the patient have Advanced Care Planning documents?: Yes, Living Will and POA    Patient's Reported Physical Status: Needs Assistance    Patient utilizes these assistive devices and/or in home care services: Walker    Patient's [place] Status: Did not serve in the .    Patient's Current Living Arrangements/Home Environment: Patient lives alone and her son lives in [place].     Patient's Spiritual Affiliation/Impact on Care: No spiritual affiliation noted at this time.    Patient's Behavioral Health/Substance Abuse History: No behavioral health/substance abuse concerns.    [facility] updated and reviewed with the patient during this program:     Financial Resource Strain: Low Risk    • Difficulty of Paying Living Expenses: Not very hard         Food Insecurity: Food Insecurity Present   • Worried About Running Out of Food in the Last Year: Sometimes true   • Ran Out of Food in the Last Year: Sometimes true         Transportation Needs: Unmet Transportation Needs   • Lack of Transportation (Medical): Yes   • Lack of Transportation (Non-Medical): Yes         Housing Stability: Low Risk    • Unable to Pay for Housing in the Last Year: No   • Number of Places Lived in the Last Year: 1   • Unstable Housing in the Last Year: No      Patient Outreach    MSW spoke with patient on this day to discuss resources needed. Patient states that she needs help with grocery shopping, transportation, and caregiver assistance. Patient states that she looked into Assisted Living, but cannot afford. MSW spoke with patient about alternative option with [place] Medicaid Waiver Programs. Patient stated that her son lives in [place], but she has a local friend who assists her when she is not working. DANICA  provided patient with two in-home caregiver private pay agencies ([employer] and [employer]). MSW also provided patient with meals on wheels information. MSW provided patient with the [facility] Medicaid phone number for the [facility] Waiver. MSW also provided patient with [employer] information, but patient states that she does not like to use [employer] due to wait times.    Care Coordination    MSW left a voicemail with Graham Dungannon ADD to contact patient for assessment for meals on wheels.     DISCUSSION AND PLAN    MSW to follow up with patient regarding in home supports.     Verbal consent obtained to contact/refer to the following individuals and/or agencies: DANICA Monteiro   , Ambulatory Case Management    8/19/2021 15:13 EDT

## 2021-08-23 ENCOUNTER — PATIENT OUTREACH (OUTPATIENT)
Dept: CASE MANAGEMENT | Facility: OTHER | Age: 86
End: 2021-08-23

## 2021-08-23 NOTE — OUTREACH NOTE
Patient Outreach    MSW spoke with patient regarding in home supports. Patient given resources for [employer] to take her to the grocery on Mondays through the senior program at [facility]. Patient also given resources for [facility] [employer] caregiver services and encouraged patient to apply for Long Term Care Medicaid for in home caregivers to remain independent within her home. MSW to continue to follow up with patient.    DANICA Perry   , Ambulatory Case Management    8/23/2021 16:01 EDT

## 2021-09-10 ENCOUNTER — TELEPHONE (OUTPATIENT)
Dept: INTERNAL MEDICINE | Facility: CLINIC | Age: 86
End: 2021-09-10

## 2021-09-10 NOTE — TELEPHONE ENCOUNTER
Caller: Emi Ball    Relationship to patient: Self    Best call back number: [phone]    Chief complaint: ONGOING ISSUES WITH KNEE AND BACK PAIN AFTER FALL IN June OF 2021    Type of visit: OFFICE VISIT     Requested date: ANY DAY BUT IT NEED TO BE AFTER LUNCH         Additional notes:PATIENT ONLY WANTS TO SEE DR. MAE LARA TRIED TO SCHEDULE PATIENT FOR Tuesday, SEPTEMBER 14 AT 8:30 AM BUT PATIENT STATES THAT WAS TOO EARLY. [facility] DID NOT HAVE ANY AVAILABILITY TO BE SEEN SOONER THAN October 26. PATIENT CANNOT WAIT THAT LONG TO BE SEEN FOR ISSUE.    PLEASE ADVISE PATIENT. THANKS.

## 2021-10-12 ENCOUNTER — TELEPHONE (OUTPATIENT)
Dept: INTERNAL MEDICINE | Facility: CLINIC | Age: 86
End: 2021-10-12

## 2021-10-12 NOTE — TELEPHONE ENCOUNTER
Caller: MONIQUE RIOS    Relationship to Patient: SELF    Phone Number: [phone]     Reason for Call: PATIENT CALLED WANTING TO TRANSFER CARE TO [facility]. PATIENT HAS TO HAVE TRANSPORTATION HELP AND CAN NOT DO MORNING APPOINTMENTS.  PER , RECOMMENDED PATIENT CALL BACK TOMORROW WHEN TERENCE OR ONE OF HER NURSES IS IN THE OFFICE TO GET THAT APPROVED FOR A LATER TIME.

## 2021-10-27 ENCOUNTER — OFFICE VISIT (OUTPATIENT)
Dept: INTERNAL MEDICINE | Facility: CLINIC | Age: 86
End: 2021-10-27

## 2021-10-27 VITALS
WEIGHT: 83 LBS | SYSTOLIC BLOOD PRESSURE: 182 MMHG | BODY MASS INDEX: 15.67 KG/M2 | HEIGHT: 61 IN | HEART RATE: 72 BPM | DIASTOLIC BLOOD PRESSURE: 80 MMHG | RESPIRATION RATE: 14 BRPM | OXYGEN SATURATION: 97 % | TEMPERATURE: 97.5 F

## 2021-10-27 DIAGNOSIS — M85.80 OSTEOPENIA, UNSPECIFIED LOCATION: ICD-10-CM

## 2021-10-27 DIAGNOSIS — R63.6 UNDERWEIGHT: ICD-10-CM

## 2021-10-27 DIAGNOSIS — Z78.0 POSTMENOPAUSE: ICD-10-CM

## 2021-10-27 DIAGNOSIS — M41.20 OTHER IDIOPATHIC SCOLIOSIS, UNSPECIFIED SPINAL REGION: ICD-10-CM

## 2021-10-27 DIAGNOSIS — E55.9 VITAMIN D DEFICIENCY: Primary | ICD-10-CM

## 2021-10-27 DIAGNOSIS — R79.9 ABNORMAL FINDING OF BLOOD CHEMISTRY, UNSPECIFIED: ICD-10-CM

## 2021-10-27 DIAGNOSIS — F41.9 ANXIETY DISORDER, UNSPECIFIED TYPE: ICD-10-CM

## 2021-10-27 PROCEDURE — 99214 OFFICE O/P EST MOD 30 MIN: CPT | Performed by: INTERNAL MEDICINE

## 2021-10-27 NOTE — PROGRESS NOTES
"Chief Complaint  Follow-up for hip fracture    Subjective          Emi Ball presents to [facility]  History of Present Illness    States that she feels depression  She is still having people drive her around    She is able to use the cane now instead of the walker    States that she didn't like the therapists they were sending to her house    Planning to get COVID booster today    Does feel fatigued lately    Walking better and better all the time    Objective   Vital Signs:   BP (!) 182/80   Pulse 72   Temp 97.5 °F (36.4 °C)   Resp 14   Ht 154.9 cm (61\")   Wt 37.6 kg (83 lb)   SpO2 97%   BMI 15.68 kg/m²     Physical Exam  Vitals reviewed.   Constitutional:       Appearance: Normal appearance. She is well-developed.   HENT:      Head: Normocephalic and atraumatic.      Right Ear: External ear normal.      Left Ear: External ear normal.   Eyes:      Conjunctiva/sclera: Conjunctivae normal.      Pupils: Pupils are equal, round, and reactive to light.   Cardiovascular:      Rate and Rhythm: Normal rate and regular rhythm.      Heart sounds: No murmur heard.  No friction rub. No gallop.    Pulmonary:      Effort: Pulmonary effort is normal.      Breath sounds: Normal breath sounds. No wheezing or rhonchi.   Musculoskeletal:      Comments: Significant scoliosis   Skin:     General: Skin is warm and dry.   Neurological:      Mental Status: She is alert and oriented to person, place, and time.      Cranial Nerves: No cranial nerve deficit.   Psychiatric:         Mood and Affect: Affect normal.         Behavior: Behavior normal.         Thought Content: Thought content normal.        Result Review :     Common labs    Common Labsle 3/17/21 3/17/21 3/17/21    1525 1525 1525   Glucose  112 (A)    BUN  16    Creatinine  0.47 (A)    Sodium  145    Potassium  4.3    Chloride  102    Calcium  10.0    Albumin  4.4    Total Bilirubin  0.62    Alkaline Phosphatase  68  "   AST (SGOT)  26    ALT (SGPT)  19    WBC 5.36     Hemoglobin 14.2     Hematocrit 44.6     Platelets 253     Total Cholesterol   259 (A)   Triglycerides   81   HDL Cholesterol   97 (A)   LDL Cholesterol    146 (A)   (A) Abnormal value       Comments are available for some flowsheets but are not being displayed.              Procedures      Assessment and Plan    Diagnoses and all orders for this visit:    1. Vitamin D deficiency (Primary)  -     Vitamin D 25 Hydroxy    2. Osteopenia, unspecified location  -     Comprehensive Metabolic Panel  -     CBC & Differential  -     TSH  -     Lipid Panel    3. Other idiopathic scoliosis, unspecified spinal region  Comments:  will try getting her in with a brace company and see if this will help; will also have her cont PT  Orders:  -     Comprehensive Metabolic Panel  -     CBC & Differential  -     TSH  -     Lipid Panel    4. Anxiety disorder, unspecified type  Comments:  she doesn't want meds at this time, cont to monitor  Orders:  -     TSH    5. Abnormal finding of blood chemistry, unspecified   -     Lipid Panel    6. Postmenopause  -     DEXA Bone Density Axial; Future    7. Underweight  Comments:  she will work on eating more  Orders:  -     Ambulatory Referral to Social Care Services (Amb Case Mgmt)              Follow Up   Return in about 2 months (around 12/27/2021).  Patient was given instructions and counseling regarding her condition or for health maintenance advice. Please see specific information pulled into the AVS if appropriate.

## 2021-10-27 NOTE — PATIENT INSTRUCTIONS
Call [facility] [phone] for back brace    If BP is greater than 140 on top or 90 on the bottom call me to start a blood pressure medicine.

## 2021-11-01 ENCOUNTER — PATIENT OUTREACH (OUTPATIENT)
Dept: CASE MANAGEMENT | Facility: OTHER | Age: 86
End: 2021-11-01

## 2021-11-01 NOTE — OUTREACH NOTE
Patient Outreach    MSW spoke with patient via phone to discuss Meals on Wheels resources. Patient had declined assistance with Meals on Wheels in the past, but is interested now. MSW sent referral to Cassandra at [facility] and Cassandra states that she will reach out to patient this week for assessment and to also place patient on the waitlist for homemaker services. Patient also has [employer] set up that assists with one doctor appointment per month for free and to the grocery 1x per week. No other needs at this time. MSW to d/c from [facility].     DANICA Perry   , Ambulatory Case Management    11/1/2021 16:20 EDT

## 2021-11-05 ENCOUNTER — LAB (OUTPATIENT)
Dept: LAB | Facility: HOSPITAL | Age: 86
End: 2021-11-05

## 2021-11-05 LAB
25(OH)D3 SERPL-MCNC: 92.2 NG/ML
ALBUMIN SERPL-MCNC: 4.2 G/DL (ref 3.5–5.2)
ALBUMIN/GLOB SERPL: 1.6 G/DL
ALP SERPL-CCNC: 73 U/L (ref 39–117)
ALT SERPL W P-5'-P-CCNC: 16 U/L (ref 1–33)
ANION GAP SERPL CALCULATED.3IONS-SCNC: 6.9 MMOL/L (ref 5–15)
AST SERPL-CCNC: 26 U/L (ref 1–32)
BASOPHILS # BLD AUTO: 0.07 10*3/MM3 (ref 0–0.2)
BASOPHILS NFR BLD AUTO: 1.5 % (ref 0–1.5)
BILIRUB SERPL-MCNC: 1 MG/DL (ref 0–1.2)
BUN SERPL-MCNC: 12 MG/DL (ref 8–23)
BUN/CREAT SERPL: 22.6 (ref 7–25)
CALCIUM SPEC-SCNC: 9.9 MG/DL (ref 8.6–10.5)
CHLORIDE SERPL-SCNC: 101 MMOL/L (ref 98–107)
CHOLEST SERPL-MCNC: 265 MG/DL (ref 0–200)
CO2 SERPL-SCNC: 33.1 MMOL/L (ref 22–29)
CREAT SERPL-MCNC: 0.53 MG/DL (ref 0.57–1)
DEPRECATED RDW RBC AUTO: 40.6 FL (ref 37–54)
EOSINOPHIL # BLD AUTO: 0.16 10*3/MM3 (ref 0–0.4)
EOSINOPHIL NFR BLD AUTO: 3.5 % (ref 0.3–6.2)
ERYTHROCYTE [DISTWIDTH] IN BLOOD BY AUTOMATED COUNT: 12.4 % (ref 12.3–15.4)
GFR SERPL CREATININE-BSD FRML MDRD: 109 ML/MIN/1.73
GLOBULIN UR ELPH-MCNC: 2.7 GM/DL
GLUCOSE SERPL-MCNC: 92 MG/DL (ref 65–99)
HCT VFR BLD AUTO: 40.1 % (ref 34–46.6)
HDLC SERPL-MCNC: 97 MG/DL (ref 40–60)
HGB BLD-MCNC: 13.8 G/DL (ref 12–15.9)
IMM GRANULOCYTES # BLD AUTO: 0.01 10*3/MM3 (ref 0–0.05)
IMM GRANULOCYTES NFR BLD AUTO: 0.2 % (ref 0–0.5)
LDLC SERPL CALC-MCNC: 158 MG/DL (ref 0–100)
LDLC/HDLC SERPL: 1.6 {RATIO}
LYMPHOCYTES # BLD AUTO: 1.97 10*3/MM3 (ref 0.7–3.1)
LYMPHOCYTES NFR BLD AUTO: 43.3 % (ref 19.6–45.3)
MCH RBC QN AUTO: 31.2 PG (ref 26.6–33)
MCHC RBC AUTO-ENTMCNC: 34.4 G/DL (ref 31.5–35.7)
MCV RBC AUTO: 90.5 FL (ref 79–97)
MONOCYTES # BLD AUTO: 0.39 10*3/MM3 (ref 0.1–0.9)
MONOCYTES NFR BLD AUTO: 8.6 % (ref 5–12)
NEUTROPHILS NFR BLD AUTO: 1.95 10*3/MM3 (ref 1.7–7)
NEUTROPHILS NFR BLD AUTO: 42.9 % (ref 42.7–76)
NRBC BLD AUTO-RTO: 0 /100 WBC (ref 0–0.2)
PLATELET # BLD AUTO: 221 10*3/MM3 (ref 140–450)
PMV BLD AUTO: 10 FL (ref 6–12)
POTASSIUM SERPL-SCNC: 3.7 MMOL/L (ref 3.5–5.2)
PROT SERPL-MCNC: 6.9 G/DL (ref 6–8.5)
RBC # BLD AUTO: 4.43 10*6/MM3 (ref 3.77–5.28)
SODIUM SERPL-SCNC: 141 MMOL/L (ref 136–145)
TRIGL SERPL-MCNC: 66 MG/DL (ref 0–150)
TSH SERPL DL<=0.05 MIU/L-ACNC: 1 UIU/ML (ref 0.27–4.2)
VLDLC SERPL-MCNC: 10 MG/DL (ref 5–40)
WBC # BLD AUTO: 4.55 10*3/MM3 (ref 3.4–10.8)

## 2021-11-05 PROCEDURE — 85025 COMPLETE CBC W/AUTO DIFF WBC: CPT | Performed by: INTERNAL MEDICINE

## 2021-11-05 PROCEDURE — 82306 VITAMIN D 25 HYDROXY: CPT | Performed by: INTERNAL MEDICINE

## 2021-11-05 PROCEDURE — 80061 LIPID PANEL: CPT | Performed by: INTERNAL MEDICINE

## 2021-11-05 PROCEDURE — 80053 COMPREHEN METABOLIC PANEL: CPT | Performed by: INTERNAL MEDICINE

## 2021-11-05 PROCEDURE — 84443 ASSAY THYROID STIM HORMONE: CPT | Performed by: INTERNAL MEDICINE

## 2022-02-11 ENCOUNTER — APPOINTMENT (OUTPATIENT)
Dept: BONE DENSITY | Facility: HOSPITAL | Age: 87
End: 2022-02-11

## 2022-09-15 ENCOUNTER — OFFICE VISIT (OUTPATIENT)
Dept: FAMILY MEDICINE CLINIC | Facility: CLINIC | Age: 87
End: 2022-09-15

## 2022-09-15 VITALS
RESPIRATION RATE: 19 BRPM | SYSTOLIC BLOOD PRESSURE: 120 MMHG | WEIGHT: 80.8 LBS | HEIGHT: 60 IN | DIASTOLIC BLOOD PRESSURE: 68 MMHG | OXYGEN SATURATION: 95 % | HEART RATE: 81 BPM | BODY MASS INDEX: 15.86 KG/M2 | TEMPERATURE: 97.7 F

## 2022-09-15 DIAGNOSIS — R53.82 CHRONIC FATIGUE: ICD-10-CM

## 2022-09-15 DIAGNOSIS — Z91.81 AT HIGH RISK FOR FALLS: ICD-10-CM

## 2022-09-15 DIAGNOSIS — R26.81 UNSTEADY GAIT: ICD-10-CM

## 2022-09-15 DIAGNOSIS — K21.9 GASTROESOPHAGEAL REFLUX DISEASE WITHOUT ESOPHAGITIS: ICD-10-CM

## 2022-09-15 DIAGNOSIS — R53.81 PHYSICAL DECONDITIONING: ICD-10-CM

## 2022-09-15 DIAGNOSIS — R64 CACHEXIA: ICD-10-CM

## 2022-09-15 DIAGNOSIS — Z76.89 ENCOUNTER TO ESTABLISH CARE: Primary | ICD-10-CM

## 2022-09-15 DIAGNOSIS — J32.0 CHRONIC MAXILLARY SINUSITIS: ICD-10-CM

## 2022-09-15 DIAGNOSIS — R53.81 DEBILITY: ICD-10-CM

## 2022-09-15 PROCEDURE — 99204 OFFICE O/P NEW MOD 45 MIN: CPT | Performed by: STUDENT IN AN ORGANIZED HEALTH CARE EDUCATION/TRAINING PROGRAM

## 2022-09-15 RX ORDER — AMOXICILLIN 400 MG/5ML
450 POWDER, FOR SUSPENSION ORAL 2 TIMES DAILY
Qty: 112 ML | Refills: 0 | Status: SHIPPED | OUTPATIENT
Start: 2022-09-15 | End: 2022-09-25

## 2022-09-15 NOTE — PROGRESS NOTES
"Chief Complaint  Establishing care with new provider for management of GERD/physical deconditioning/fatigue and sinus drainage    Subjective         Emi Ball is a 86 y.o. female who presents to [facility]    86 years old female comes to the clinic today to establish care and follow-up.    Patient reports intermittent history of sinus infection/sinus drainage, reports sinus congestion specially at night and morning time with mild coughing without any chest pain or shortness of breath.  Denies any fever or any sick contact recently.    Patient lives by herself, independent somewhat but does report physical deconditioning due to unsteady gait/age-related wound changes and debility.    Patient does report good appetite but no significant intake of fluids.    Does have chronic fatigue and would like to check B12.    Does report unsteady gait due to comorbidities and physical deconditioning.    Objective   Vital Signs:   Vitals:    09/15/22 1331   BP: 120/68   Pulse: 81   Resp: 19   Temp: 97.7 °F (36.5 °C)   SpO2: 95%   Weight: 36.7 kg (80 lb 12.8 oz)   Height: 152.4 cm (60\")      Body mass index is 15.78 kg/m².   Wt Readings from Last 3 Encounters:   09/15/22 36.7 kg (80 lb 12.8 oz)   10/27/21 37.6 kg (83 lb)   08/18/21 37.2 kg (82 lb)      BP Readings from Last 3 Encounters:   09/15/22 120/68   10/27/21 (!) 182/80   08/18/21 128/90        Patient Care Team:  Ila Pino MD as PCP - General (Family Medicine)     Physical Exam  Vitals reviewed.   Constitutional:       Appearance: Normal appearance. She is well-developed.   HENT:      Head: Normocephalic and atraumatic.      Right Ear: External ear normal.      Left Ear: External ear normal.      Mouth/Throat:      Pharynx: Posterior oropharyngeal erythema present. No pharyngeal swelling or oropharyngeal exudate.      Comments: Sinus drainage noted  Eyes:      Conjunctiva/sclera: Conjunctivae normal.      Pupils: Pupils are equal, " round, and reactive to light.   Cardiovascular:      Rate and Rhythm: Normal rate and regular rhythm.      Heart sounds: No murmur heard.    No friction rub. No gallop.   Pulmonary:      Effort: Pulmonary effort is normal.      Breath sounds: Normal breath sounds. No wheezing or rhonchi.   Abdominal:      General: Bowel sounds are normal. There is no distension.      Palpations: Abdomen is soft.      Tenderness: There is no abdominal tenderness.   Skin:     General: Skin is warm and dry.   Neurological:      Mental Status: She is alert and oriented to person, place, and time.      Cranial Nerves: No cranial nerve deficit.   Psychiatric:         Mood and Affect: Mood and affect normal.         Behavior: Behavior normal.         Thought Content: Thought content normal.         Judgment: Judgment normal.                       Assessment and Plan   Diagnoses and all orders for this visit:    1. Encounter to establish care (Primary)  -     TSH Rfx On Abnormal To Free T4; Future  -     CBC & Differential; Future  -     Comprehensive Metabolic Panel; Future  -     Vitamin B12; Future  -     Folate; Future  -     Cancel: Ambulatory Referral to Home Health  -     Ambulatory Referral to Home Health    2. Gastroesophageal reflux disease without esophagitis  Comments:  Chronic, controlled on intermittent use of PPI.  Orders:  -     TSH Rfx On Abnormal To Free T4; Future  -     CBC & Differential; Future  -     Comprehensive Metabolic Panel; Future  -     Vitamin B12; Future  -     Folate; Future  -     Cancel: Ambulatory Referral to Home Health  -     Ambulatory Referral to Home Health    3. Unsteady gait  Comments:  Age-related physical deconditioning,  Orders:  -     Cancel: Ambulatory Referral to Physical Therapy Evaluate and treat  -     TSH Rfx On Abnormal To Free T4; Future  -     CBC & Differential; Future  -     Comprehensive Metabolic Panel; Future  -     Vitamin B12; Future  -     Folate; Future  -     Cancel:  Ambulatory Referral to Home Health  -     Ambulatory Referral to Home Health    4. Physical deconditioning  -     Cancel: Ambulatory Referral to Physical Therapy Evaluate and treat  -     TSH Rfx On Abnormal To Free T4; Future  -     CBC & Differential; Future  -     Comprehensive Metabolic Panel; Future  -     Vitamin B12; Future  -     Folate; Future  -     Cancel: Ambulatory Referral to Home Health  -     Ambulatory Referral to Home Health    5. At high risk for falls  -     Cancel: Ambulatory Referral to Physical Therapy Evaluate and treat  -     TSH Rfx On Abnormal To Free T4; Future  -     CBC & Differential; Future  -     Comprehensive Metabolic Panel; Future  -     Vitamin B12; Future  -     Folate; Future  -     Cancel: Ambulatory Referral to Home Health  -     Ambulatory Referral to Home Health    6. Chronic fatigue  -     TSH Rfx On Abnormal To Free T4; Future  -     CBC & Differential; Future  -     Comprehensive Metabolic Panel; Future  -     Vitamin B12; Future  -     Folate; Future  -     Cancel: Ambulatory Referral to Home Health  -     Ambulatory Referral to Home Health    7. Chronic maxillary sinusitis  Comments:  We will treat with liquid amoxicillin as requested by patient, ENT offered but declined  Orders:  -     amoxicillin (AMOXIL) 400 MG/5ML suspension; Take 5.6 mL by mouth 2 (Two) Times a Day for 10 days.  Dispense: 112 mL; Refill: 0  -     Cancel: Ambulatory Referral to Home Health  -     Ambulatory Referral to Home Health    8. Debility  -     Cancel: Ambulatory Referral to Home Health  -     Ambulatory Referral to Home Health    9. Cachexia (HCC)  -     Cancel: Ambulatory Referral to Home Health  -     Ambulatory Referral to Home Health          Tobacco Use: Low Risk    • Smoking Tobacco Use: Never Smoker   • Smokeless Tobacco Use: Never Used            Follow Up   Return in about 3 months (around 12/15/2022).  Patient was given instructions and counseling regarding her condition or for  health maintenance advice. Please see specific information pulled into the AVS if appropriate.

## 2022-09-16 ENCOUNTER — TELEPHONE (OUTPATIENT)
Dept: FAMILY MEDICINE CLINIC | Facility: CLINIC | Age: 87
End: 2022-09-16

## 2022-09-16 NOTE — TELEPHONE ENCOUNTER
Caller: Lizzy Emi GRIER    Relationship: Self    Best call back number: [phone]    What is the best time to reach you: ANYTIME     Who are you requesting to speak with (clinical staff, provider,  specific staff member): NURS        What was the call regarding: PATIENT IS CALLING STATING THAT SHE DOES NOT HAVE A RIDE TO THERAPY SO SHE WOULD LIKE TO CHANGE THE THERAPY FROM IN THE OFFICE TO HAVING IT AT HER HOME.     Do you require a callback: YES

## 2022-09-21 ENCOUNTER — LAB (OUTPATIENT)
Dept: LAB | Facility: HOSPITAL | Age: 87
End: 2022-09-21

## 2022-09-21 DIAGNOSIS — Z91.81 AT HIGH RISK FOR FALLS: ICD-10-CM

## 2022-09-21 DIAGNOSIS — R53.82 CHRONIC FATIGUE: ICD-10-CM

## 2022-09-21 DIAGNOSIS — R26.81 UNSTEADY GAIT: ICD-10-CM

## 2022-09-21 DIAGNOSIS — R53.81 PHYSICAL DECONDITIONING: ICD-10-CM

## 2022-09-21 DIAGNOSIS — K21.9 GASTROESOPHAGEAL REFLUX DISEASE WITHOUT ESOPHAGITIS: ICD-10-CM

## 2022-09-21 DIAGNOSIS — Z76.89 ENCOUNTER TO ESTABLISH CARE: ICD-10-CM

## 2022-09-21 LAB
ALBUMIN SERPL-MCNC: 4.3 G/DL (ref 3.5–5.2)
ALBUMIN/GLOB SERPL: 1.9 G/DL
ALP SERPL-CCNC: 57 U/L (ref 39–117)
ALT SERPL W P-5'-P-CCNC: 19 U/L (ref 1–33)
ANION GAP SERPL CALCULATED.3IONS-SCNC: 10.2 MMOL/L (ref 5–15)
AST SERPL-CCNC: 26 U/L (ref 1–32)
BASOPHILS # BLD AUTO: 0.03 10*3/MM3 (ref 0–0.2)
BASOPHILS NFR BLD AUTO: 0.7 % (ref 0–1.5)
BILIRUB SERPL-MCNC: 1 MG/DL (ref 0–1.2)
BUN SERPL-MCNC: 13 MG/DL (ref 8–23)
BUN/CREAT SERPL: 25 (ref 7–25)
CALCIUM SPEC-SCNC: 9.3 MG/DL (ref 8.6–10.5)
CHLORIDE SERPL-SCNC: 102 MMOL/L (ref 98–107)
CO2 SERPL-SCNC: 31.8 MMOL/L (ref 22–29)
CREAT SERPL-MCNC: 0.52 MG/DL (ref 0.57–1)
DEPRECATED RDW RBC AUTO: 43.2 FL (ref 37–54)
EGFRCR SERPLBLD CKD-EPI 2021: 90.6 ML/MIN/1.73
EOSINOPHIL # BLD AUTO: 0.12 10*3/MM3 (ref 0–0.4)
EOSINOPHIL NFR BLD AUTO: 2.7 % (ref 0.3–6.2)
ERYTHROCYTE [DISTWIDTH] IN BLOOD BY AUTOMATED COUNT: 12.3 % (ref 12.3–15.4)
FOLATE SERPL-MCNC: >20 NG/ML (ref 4.78–24.2)
GLOBULIN UR ELPH-MCNC: 2.3 GM/DL
GLUCOSE SERPL-MCNC: 99 MG/DL (ref 65–99)
HCT VFR BLD AUTO: 42 % (ref 34–46.6)
HGB BLD-MCNC: 13.8 G/DL (ref 12–15.9)
IMM GRANULOCYTES # BLD AUTO: 0.01 10*3/MM3 (ref 0–0.05)
IMM GRANULOCYTES NFR BLD AUTO: 0.2 % (ref 0–0.5)
LYMPHOCYTES # BLD AUTO: 2.19 10*3/MM3 (ref 0.7–3.1)
LYMPHOCYTES NFR BLD AUTO: 49.5 % (ref 19.6–45.3)
MCH RBC QN AUTO: 31.2 PG (ref 26.6–33)
MCHC RBC AUTO-ENTMCNC: 32.9 G/DL (ref 31.5–35.7)
MCV RBC AUTO: 94.8 FL (ref 79–97)
MONOCYTES # BLD AUTO: 0.44 10*3/MM3 (ref 0.1–0.9)
MONOCYTES NFR BLD AUTO: 10 % (ref 5–12)
NEUTROPHILS NFR BLD AUTO: 1.63 10*3/MM3 (ref 1.7–7)
NEUTROPHILS NFR BLD AUTO: 36.9 % (ref 42.7–76)
NRBC BLD AUTO-RTO: 0 /100 WBC (ref 0–0.2)
PLATELET # BLD AUTO: 209 10*3/MM3 (ref 140–450)
PMV BLD AUTO: 10.1 FL (ref 6–12)
POTASSIUM SERPL-SCNC: 4.9 MMOL/L (ref 3.5–5.2)
PROT SERPL-MCNC: 6.6 G/DL (ref 6–8.5)
RBC # BLD AUTO: 4.43 10*6/MM3 (ref 3.77–5.28)
SODIUM SERPL-SCNC: 144 MMOL/L (ref 136–145)
TSH SERPL DL<=0.05 MIU/L-ACNC: 0.96 UIU/ML (ref 0.27–4.2)
VIT B12 BLD-MCNC: 818 PG/ML (ref 211–946)
WBC NRBC COR # BLD: 4.42 10*3/MM3 (ref 3.4–10.8)

## 2022-09-21 PROCEDURE — 85025 COMPLETE CBC W/AUTO DIFF WBC: CPT

## 2022-09-21 PROCEDURE — 84443 ASSAY THYROID STIM HORMONE: CPT

## 2022-09-21 PROCEDURE — 36415 COLL VENOUS BLD VENIPUNCTURE: CPT

## 2022-09-21 PROCEDURE — 80053 COMPREHEN METABOLIC PANEL: CPT

## 2022-09-21 PROCEDURE — 82746 ASSAY OF FOLIC ACID SERUM: CPT

## 2022-09-21 PROCEDURE — 82607 VITAMIN B-12: CPT

## 2022-10-04 ENCOUNTER — TELEPHONE (OUTPATIENT)
Dept: FAMILY MEDICINE CLINIC | Facility: CLINIC | Age: 87
End: 2022-10-04

## 2022-10-04 NOTE — TELEPHONE ENCOUNTER
PT CALLED ASKING ABOUT HER HOME HEALTH APT. WHEN I CHECKED ON THE ORDER IT WAS CLOSED AND SAID THAT THE PT WAS NOT IN THE [facility]. THE PT L[place]. THE PT IS HAVING TROUBLE WITH  HER LEFT EYE. HER EYE IS DRY, ITCHY AND RED. DOES THE PT NEED TO COME IN FOR AN APT OR CAN DR MIGUEL CALL SOMETHING IN?

## 2022-10-04 NOTE — TELEPHONE ENCOUNTER
Can you place a new home health order I'm not sure why it was closed stating that she's not in the area she lives in [facility].

## 2022-10-10 NOTE — TELEPHONE ENCOUNTER
Jamel from [employer] was calling to let Dr Pino know she is faxing over orders for start of care for the patient.

## 2022-10-14 NOTE — TELEPHONE ENCOUNTER
[facility] nurse was calling to let us know that she is discharging patient from PT. Patient has cancelled 3 times in the last week.

## 2022-12-13 ENCOUNTER — TELEPHONE (OUTPATIENT)
Dept: FAMILY MEDICINE CLINIC | Facility: CLINIC | Age: 87
End: 2022-12-13

## 2022-12-13 NOTE — TELEPHONE ENCOUNTER
HUB TO READ AND SHARE  Attempted to leave message no mail box. Seeing if patient would like to reschedule appointment from 12/15/22   //

## 2023-02-21 ENCOUNTER — TRANSCRIBE ORDERS (OUTPATIENT)
Dept: LAB | Facility: HOSPITAL | Age: 88
End: 2023-02-21
Payer: MEDICARE

## 2023-02-21 ENCOUNTER — LAB (OUTPATIENT)
Dept: LAB | Facility: HOSPITAL | Age: 88
End: 2023-02-21
Payer: MEDICARE

## 2023-02-21 DIAGNOSIS — D64.9 ANEMIA, UNSPECIFIED TYPE: Primary | ICD-10-CM

## 2023-02-21 DIAGNOSIS — D64.9 ANEMIA, UNSPECIFIED TYPE: ICD-10-CM

## 2023-02-21 DIAGNOSIS — R53.83 OTHER FATIGUE: ICD-10-CM

## 2023-02-21 LAB
ALBUMIN SERPL-MCNC: 4.4 G/DL (ref 3.5–5.2)
ALBUMIN/GLOB SERPL: 1.7 G/DL
ALP SERPL-CCNC: 60 U/L (ref 39–117)
ALT SERPL W P-5'-P-CCNC: 18 U/L (ref 1–33)
ANION GAP SERPL CALCULATED.3IONS-SCNC: 5.3 MMOL/L (ref 5–15)
AST SERPL-CCNC: 28 U/L (ref 1–32)
BASOPHILS # BLD AUTO: 0.03 10*3/MM3 (ref 0–0.2)
BASOPHILS NFR BLD AUTO: 0.6 % (ref 0–1.5)
BILIRUB SERPL-MCNC: 0.9 MG/DL (ref 0–1.2)
BUN SERPL-MCNC: 14 MG/DL (ref 8–23)
BUN/CREAT SERPL: 29.2 (ref 7–25)
CALCIUM SPEC-SCNC: 9.7 MG/DL (ref 8.6–10.5)
CHLORIDE SERPL-SCNC: 102 MMOL/L (ref 98–107)
CO2 SERPL-SCNC: 31.7 MMOL/L (ref 22–29)
CREAT SERPL-MCNC: 0.48 MG/DL (ref 0.57–1)
DEPRECATED RDW RBC AUTO: 42.9 FL (ref 37–54)
EGFRCR SERPLBLD CKD-EPI 2021: 91.8 ML/MIN/1.73
EOSINOPHIL # BLD AUTO: 0.09 10*3/MM3 (ref 0–0.4)
EOSINOPHIL NFR BLD AUTO: 1.9 % (ref 0.3–6.2)
ERYTHROCYTE [DISTWIDTH] IN BLOOD BY AUTOMATED COUNT: 12.5 % (ref 12.3–15.4)
FOLATE SERPL-MCNC: >20 NG/ML (ref 4.78–24.2)
GLOBULIN UR ELPH-MCNC: 2.6 GM/DL
GLUCOSE SERPL-MCNC: 91 MG/DL (ref 65–99)
HCT VFR BLD AUTO: 42 % (ref 34–46.6)
HGB BLD-MCNC: 14 G/DL (ref 12–15.9)
IMM GRANULOCYTES # BLD AUTO: 0.01 10*3/MM3 (ref 0–0.05)
IMM GRANULOCYTES NFR BLD AUTO: 0.2 % (ref 0–0.5)
IRON 24H UR-MRATE: 134 MCG/DL (ref 37–145)
IRON SATN MFR SERPL: 37 % (ref 20–50)
LYMPHOCYTES # BLD AUTO: 1.91 10*3/MM3 (ref 0.7–3.1)
LYMPHOCYTES NFR BLD AUTO: 40.4 % (ref 19.6–45.3)
MCH RBC QN AUTO: 31.3 PG (ref 26.6–33)
MCHC RBC AUTO-ENTMCNC: 33.3 G/DL (ref 31.5–35.7)
MCV RBC AUTO: 93.8 FL (ref 79–97)
MONOCYTES # BLD AUTO: 0.4 10*3/MM3 (ref 0.1–0.9)
MONOCYTES NFR BLD AUTO: 8.5 % (ref 5–12)
NEUTROPHILS NFR BLD AUTO: 2.29 10*3/MM3 (ref 1.7–7)
NEUTROPHILS NFR BLD AUTO: 48.4 % (ref 42.7–76)
NRBC BLD AUTO-RTO: 0 /100 WBC (ref 0–0.2)
PLATELET # BLD AUTO: 228 10*3/MM3 (ref 140–450)
PMV BLD AUTO: 10.2 FL (ref 6–12)
POTASSIUM SERPL-SCNC: 3.8 MMOL/L (ref 3.5–5.2)
PROT SERPL-MCNC: 7 G/DL (ref 6–8.5)
RBC # BLD AUTO: 4.48 10*6/MM3 (ref 3.77–5.28)
SODIUM SERPL-SCNC: 139 MMOL/L (ref 136–145)
TIBC SERPL-MCNC: 365 MCG/DL (ref 298–536)
TRANSFERRIN SERPL-MCNC: 245 MG/DL (ref 200–360)
TSH SERPL DL<=0.05 MIU/L-ACNC: 0.99 UIU/ML (ref 0.27–4.2)
VIT B12 BLD-MCNC: 981 PG/ML (ref 211–946)
WBC NRBC COR # BLD: 4.73 10*3/MM3 (ref 3.4–10.8)

## 2023-02-21 PROCEDURE — 85025 COMPLETE CBC W/AUTO DIFF WBC: CPT

## 2023-02-21 PROCEDURE — 36415 COLL VENOUS BLD VENIPUNCTURE: CPT

## 2023-02-21 PROCEDURE — 80053 COMPREHEN METABOLIC PANEL: CPT

## 2023-02-21 PROCEDURE — 84443 ASSAY THYROID STIM HORMONE: CPT

## 2023-02-21 PROCEDURE — 82746 ASSAY OF FOLIC ACID SERUM: CPT

## 2023-02-21 PROCEDURE — 82607 VITAMIN B-12: CPT

## 2023-02-21 PROCEDURE — 83540 ASSAY OF IRON: CPT

## 2023-02-21 PROCEDURE — 84466 ASSAY OF TRANSFERRIN: CPT

## 2023-03-03 ENCOUNTER — TELEPHONE (OUTPATIENT)
Dept: INTERNAL MEDICINE | Facility: CLINIC | Age: 88
End: 2023-03-03

## 2023-03-03 NOTE — TELEPHONE ENCOUNTER
Caller: Emi Ball    Relationship to patient: Self    Best call back number: [phone]  [phone]    Chief complaint: NEW PATIENT APPT    Type of visit: NEW PATIENT      Additional notes:PATIENT STATES SHE WAS TOLD FALSELY THAT DR WALL DOESN'T ACCEPT [employer] INSURANCE IN A LETTER FROM [facility], AND SO SHE ESTABLISHED CARE WITH A NEW PROVIDER. PATIENT STATES SHE RECEIVED AND APOLOGY LETTER THAT STATES IT WAS AN ERROR AND SO SHE WOULD LIKE TO ESTABLISH CARE WITH DR WALL ONCE AGAIN.

## 2023-03-07 ENCOUNTER — TELEPHONE (OUTPATIENT)
Dept: INTERNAL MEDICINE | Facility: CLINIC | Age: 88
End: 2023-03-07
Payer: MEDICARE

## 2023-03-07 NOTE — TELEPHONE ENCOUNTER
Patient called and wants to continue care with you. I advised Patient to follow up with urgent care for sore throat. That if you agreed it may be May 2023  before we can see her.

## 2023-03-08 ENCOUNTER — TELEPHONE (OUTPATIENT)
Dept: INTERNAL MEDICINE | Facility: CLINIC | Age: 88
End: 2023-03-08

## 2023-03-08 NOTE — TELEPHONE ENCOUNTER
Yes of course, happy to see her.  Front please call to help her schedule.  (Marianela, is there any way we can let the hub know this so they can do service recovery when the call happens instead of having to send to me?)

## 2023-03-08 NOTE — TELEPHONE ENCOUNTER
Patient only wanted to see Dr Godfrey. Scheduled an appt with Dr Godfrey for Friday at 245. Patient said she has to call bus to schedule a  and will call us back to make sure she can do that time. Patient has had a sore throat and cough since October on and off with being hoarse.

## 2023-03-10 ENCOUNTER — OFFICE VISIT (OUTPATIENT)
Dept: INTERNAL MEDICINE | Facility: CLINIC | Age: 88
End: 2023-03-10
Payer: MEDICARE

## 2023-03-10 VITALS
SYSTOLIC BLOOD PRESSURE: 138 MMHG | TEMPERATURE: 98.4 F | WEIGHT: 81.4 LBS | OXYGEN SATURATION: 95 % | BODY MASS INDEX: 15.98 KG/M2 | HEART RATE: 96 BPM | DIASTOLIC BLOOD PRESSURE: 88 MMHG | HEIGHT: 60 IN

## 2023-03-10 DIAGNOSIS — H61.23 BILATERAL IMPACTED CERUMEN: ICD-10-CM

## 2023-03-10 DIAGNOSIS — M79.671 RIGHT FOOT PAIN: ICD-10-CM

## 2023-03-10 DIAGNOSIS — K21.9 GASTROESOPHAGEAL REFLUX DISEASE, UNSPECIFIED WHETHER ESOPHAGITIS PRESENT: ICD-10-CM

## 2023-03-10 DIAGNOSIS — M41.9 SEVERE SCOLIOSIS: Primary | ICD-10-CM

## 2023-03-10 PROCEDURE — 1160F RVW MEDS BY RX/DR IN RCRD: CPT | Performed by: INTERNAL MEDICINE

## 2023-03-10 PROCEDURE — 99214 OFFICE O/P EST MOD 30 MIN: CPT | Performed by: INTERNAL MEDICINE

## 2023-03-10 PROCEDURE — 1159F MED LIST DOCD IN RCRD: CPT | Performed by: INTERNAL MEDICINE

## 2023-03-10 RX ORDER — LANSOPRAZOLE 15 MG/1
15 CAPSULE, DELAYED RELEASE ORAL DAILY
Qty: 90 CAPSULE | Refills: 1 | Status: SHIPPED | OUTPATIENT
Start: 2023-03-10

## 2023-03-10 NOTE — PROGRESS NOTES
"Chief Complaint  Hoarse (Pt states ongoing since October 2022 (5 months) - states she has tried allergy relief and it does not help) and Foot Pain (Pt states having pain in her right foot, states it does not happen during the day only at night when she is in bed and the intensity of the pain will wake her up)    Subjective          Emi Ball presents to [facility]  History of Present Illness     Hoarse-  Has been off and on since October  Has tried allergy treatment without relief  Does feel like her stomach irritation may be worse especially as her back is worsened      Does not want to do much for her back as she is not interested in a surgery and does not wish to see a back surgeon however her back does bother her fairly significantly    Pain in her right foot that wakes her up from sleep        Objective   Vital Signs:   /88   Pulse 96   Temp 98.4 °F (36.9 °C) (Temporal)   Ht 152.4 cm (60\")   Wt 36.9 kg (81 lb 6.4 oz)   SpO2 95%   BMI 15.90 kg/m²     Physical Exam  Vitals reviewed.   Constitutional:       Appearance: Normal appearance. She is well-developed.   HENT:      Head: Normocephalic and atraumatic.      Right Ear: External ear normal.      Left Ear: External ear normal.   Eyes:      Conjunctiva/sclera: Conjunctivae normal.      Pupils: Pupils are equal, round, and reactive to light.   Cardiovascular:      Rate and Rhythm: Normal rate and regular rhythm.      Heart sounds: No murmur heard.    No friction rub. No gallop.   Pulmonary:      Effort: Pulmonary effort is normal.      Breath sounds: Normal breath sounds. No wheezing or rhonchi.   Musculoskeletal:      Comments: Severe scoliosis   Skin:     General: Skin is warm and dry.   Neurological:      Mental Status: She is alert and oriented to person, place, and time.   Psychiatric:         Mood and Affect: Affect normal.         Behavior: Behavior normal.         Thought Content: Thought " content normal.        Result Review :       Common labs    Common Labs 9/21/22 9/21/22 2/21/23 2/21/23    1129 1129 1258 1258   Glucose  99  91   BUN  13  14   Creatinine  0.52 (A)  0.48 (A)   Sodium  144  139   Potassium  4.9  3.8   Chloride  102  102   Calcium  9.3  9.7   Albumin  4.30  4.4   Total Bilirubin  1.0  0.9   Alkaline Phosphatase  57  60   AST (SGOT)  26  28   ALT (SGPT)  19  18   WBC 4.42  4.73    Hemoglobin 13.8  14.0    Hematocrit 42.0  42.0    Platelets 209  228    (A) Abnormal value       Comments are available for some flowsheets but are not being displayed.             Results for orders placed or performed in visit on 02/21/23   Comprehensive Metabolic Panel    Specimen: Blood   Result Value Ref Range    Glucose 91 65 - 99 mg/dL    BUN 14 8 - 23 mg/dL    Creatinine 0.48 (L) 0.57 - 1.00 mg/dL    Sodium 139 136 - 145 mmol/L    Potassium 3.8 3.5 - 5.2 mmol/L    Chloride 102 98 - 107 mmol/L    CO2 31.7 (H) 22.0 - 29.0 mmol/L    Calcium 9.7 8.6 - 10.5 mg/dL    Total Protein 7.0 6.0 - 8.5 g/dL    Albumin 4.4 3.5 - 5.2 g/dL    ALT (SGPT) 18 1 - 33 U/L    AST (SGOT) 28 1 - 32 U/L    Alkaline Phosphatase 60 39 - 117 U/L    Total Bilirubin 0.9 0.0 - 1.2 mg/dL    Globulin 2.6 gm/dL    A/G Ratio 1.7 g/dL    BUN/Creatinine Ratio 29.2 (H) 7.0 - 25.0    Anion Gap 5.3 5.0 - 15.0 mmol/L    eGFR 91.8 >60.0 mL/min/1.73   Iron Profile    Specimen: Blood   Result Value Ref Range    Iron 134 37 - 145 mcg/dL    Iron Saturation 37 20 - 50 %    Transferrin 245 200 - 360 mg/dL    TIBC 365 298 - 536 mcg/dL   Vitamin B12    Specimen: Blood   Result Value Ref Range    Vitamin B-12 981 (H) 211 - 946 pg/mL   Folate    Specimen: Blood   Result Value Ref Range    Folate >20.00 4.78 - 24.20 ng/mL   TSH    Specimen: Blood   Result Value Ref Range    TSH 0.993 0.270 - 4.200 uIU/mL   CBC Auto Differential    Specimen: Blood   Result Value Ref Range    WBC 4.73 3.40 - 10.80 10*3/mm3    RBC 4.48 3.77 - 5.28 10*6/mm3    Hemoglobin  14.0 12.0 - 15.9 g/dL    Hematocrit 42.0 34.0 - 46.6 %    MCV 93.8 79.0 - 97.0 fL    MCH 31.3 26.6 - 33.0 pg    MCHC 33.3 31.5 - 35.7 g/dL    RDW 12.5 12.3 - 15.4 %    RDW-SD 42.9 37.0 - 54.0 fl    MPV 10.2 6.0 - 12.0 fL    Platelets 228 140 - 450 10*3/mm3    Neutrophil % 48.4 42.7 - 76.0 %    Lymphocyte % 40.4 19.6 - 45.3 %    Monocyte % 8.5 5.0 - 12.0 %    Eosinophil % 1.9 0.3 - 6.2 %    Basophil % 0.6 0.0 - 1.5 %    Immature Grans % 0.2 0.0 - 0.5 %    Neutrophils, Absolute 2.29 1.70 - 7.00 10*3/mm3    Lymphocytes, Absolute 1.91 0.70 - 3.10 10*3/mm3    Monocytes, Absolute 0.40 0.10 - 0.90 10*3/mm3    Eosinophils, Absolute 0.09 0.00 - 0.40 10*3/mm3    Basophils, Absolute 0.03 0.00 - 0.20 10*3/mm3    Immature Grans, Absolute 0.01 0.00 - 0.05 10*3/mm3    nRBC 0.0 0.0 - 0.2 /100 WBC            Procedures        Assessment and Plan    Diagnoses and all orders for this visit:    1. Severe scoliosis (Primary)  Comments:  [facility] if [facility] have any recommendations on bracing and/or other treatments that could help  Orders:  -     Ambulatory Referral to Physical Medicine Rehab    2. Gastroesophageal reflux disease, unspecified whether esophagitis present  Comments:  Restart Prevacid and see if this helps with her drainage    3. Right foot pain  Comments:  For now we will try Voltaren    4. Bilateral impacted cerumen  Comments:  Debrox    Other orders  -     lansoprazole (PREVACID) 15 MG capsule; Take 1 capsule by mouth Daily. Please ensure this is the dissolvable pill  Dispense: 90 capsule; Refill: 1  -     carbamide peroxide (Debrox) 6.5 % otic solution; Administer 5 drops into both ears 2 (Two) Times a Day.  Dispense: 22 mL; Refill: 1  -     Diclofenac Sodium (Voltaren) 1 % gel gel; Apply 4 g topically to the appropriate area as directed 4 (Four) Times a Day As Needed (hand pain).  Dispense: 100 g; Refill: 1          }          Follow Up   Return in about 2 months (around  5/10/2023).  Patient was given instructions and counseling regarding her condition or for health maintenance advice. Please see specific information pulled into the AVS if appropriate.

## 2023-05-12 ENCOUNTER — TELEPHONE (OUTPATIENT)
Dept: INTERNAL MEDICINE | Facility: CLINIC | Age: 88
End: 2023-05-12

## 2023-05-12 ENCOUNTER — OFFICE VISIT (OUTPATIENT)
Dept: INTERNAL MEDICINE | Facility: CLINIC | Age: 88
End: 2023-05-12
Payer: MEDICARE

## 2023-05-12 VITALS
OXYGEN SATURATION: 95 % | WEIGHT: 97.8 LBS | HEART RATE: 74 BPM | TEMPERATURE: 98 F | SYSTOLIC BLOOD PRESSURE: 124 MMHG | HEIGHT: 60 IN | RESPIRATION RATE: 20 BRPM | DIASTOLIC BLOOD PRESSURE: 78 MMHG | BODY MASS INDEX: 19.2 KG/M2

## 2023-05-12 DIAGNOSIS — H61.22 IMPACTED CERUMEN OF LEFT EAR: ICD-10-CM

## 2023-05-12 DIAGNOSIS — Z78.0 POSTMENOPAUSE: ICD-10-CM

## 2023-05-12 DIAGNOSIS — K21.9 GASTROESOPHAGEAL REFLUX DISEASE, UNSPECIFIED WHETHER ESOPHAGITIS PRESENT: ICD-10-CM

## 2023-05-12 DIAGNOSIS — M40.209 KYPHOSIS, UNSPECIFIED KYPHOSIS TYPE, UNSPECIFIED SPINAL REGION: ICD-10-CM

## 2023-05-12 DIAGNOSIS — J30.9 ALLERGIC RHINITIS, UNSPECIFIED SEASONALITY, UNSPECIFIED TRIGGER: ICD-10-CM

## 2023-05-12 DIAGNOSIS — M41.20 OTHER IDIOPATHIC SCOLIOSIS, UNSPECIFIED SPINAL REGION: Primary | ICD-10-CM

## 2023-05-12 DIAGNOSIS — Z00.00 ENCOUNTER FOR ANNUAL WELLNESS VISIT (AWV) IN MEDICARE PATIENT: ICD-10-CM

## 2023-05-12 RX ORDER — LANSOPRAZOLE 30 MG/1
30 CAPSULE, DELAYED RELEASE ORAL DAILY
Qty: 90 CAPSULE | Refills: 1 | Status: SHIPPED | OUTPATIENT
Start: 2023-05-12

## 2023-05-12 NOTE — PROGRESS NOTES
The ABCs of the Annual Wellness Visit  Subsequent Medicare Wellness Visit    Subjective      Emi Ball is a 87 y.o. female who presents for a Subsequent Medicare Wellness Visit.    The following portions of the patient's history were reviewed and   updated as appropriate: allergies, current medications, past family history, past medical history, past social history, past surgical history and problem list.    Compared to one year ago, the patient feels her physical   health is worse.    Compared to one year ago, the patient feels her mental   health is worse.    Recent Hospitalizations:  She was not admitted to the hospital during the last year.       Current Medical Providers:  Patient Care Team:  Yoselyn Gofdrey MD as PCP - General (Internal Medicine)    Outpatient Medications Prior to Visit   Medication Sig Dispense Refill   • aspirin 81 MG EC tablet Take 1 tablet by mouth 3 (Three) Times a Week.     • Calcium Carb-Cholecalciferol (Caltrate 600+D3 Soft) 600-800 MG-UNIT chewable tablet      • Coenzyme Q10 (Co Q-10) 100 MG capsule      • Misc. Devices (Commode Bedside) misc Commode for extension over current toilet needed. 1 each 0   • Multiple Vitamins-Minerals (MULTIVITAMIN ADULT EXTRA C PO) multivitamin Oral Tablet take 1 tablet by oral route once daily with food   Active     • Omega-3 Fatty Acids (fish oil) 1000 MG capsule capsule Take 1 capsule by mouth Daily With Breakfast.     • vitamin D3 125 MCG (5000 UT) capsule capsule Take 1 capsule by mouth Daily.     • lansoprazole (PREVACID) 15 MG capsule Take 1 capsule by mouth Daily. Please ensure this is the dissolvable pill 90 capsule 1   • carbamide peroxide (Debrox) 6.5 % otic solution Administer 5 drops into both ears 2 (Two) Times a Day. 22 mL 1   • Diclofenac Sodium (Voltaren) 1 % gel gel Apply 4 g topically to the appropriate area as directed 4 (Four) Times a Day As Needed (hand pain). 100 g 1   • triamcinolone (KENALOG) 0.5 % ointment Apply   "topically to the appropriate area as directed 2 (Two) Times a Day. 15 g 0     No facility-administered medications prior to visit.       No opioid medication identified on active medication list. I have reviewed chart for other potential  high risk medication/s and harmful drug interactions in the elderly.          Aspirin is on active medication list. Aspirin use is indicated based on review of current medical condition/s. Pros and cons of this therapy have been discussed today. Benefits of this medication outweigh potential harm.  Patient has been encouraged to continue taking this medication.  .      Patient Active Problem List   Diagnosis   • Gastroesophageal reflux   • Myocardial infarction   • Otalgia   • Pain, head   • Scoliosis   • Vitamin D deficiency   • Protein-calorie malnutrition, unspecified severity   • Closed fracture of right inferior pubic ramus   • Osteopenia   • Debility   • Difficulty walking   • At high risk for falls     Advance Care Planning   Advance Care Planning     Advance Directive is not on file.  ACP discussion was held with the patient during this visit. states that she thinks that her daughter would be her decision maker.     Objective    Vitals:    05/12/23 0919   BP: 124/78   BP Location: Right arm   Patient Position: Sitting   Cuff Size: Adult   Pulse: 74   Resp: 20   Temp: 98 °F (36.7 °C)   SpO2: 95%   Weight: 44.4 kg (97 lb 12.8 oz)   Height: 152.4 cm (60\")     Estimated body mass index is 19.1 kg/m² as calculated from the following:    Height as of this encounter: 152.4 cm (60\").    Weight as of this encounter: 44.4 kg (97 lb 12.8 oz).    BMI is within normal parameters. No other follow-up for BMI required.      Does the patient have evidence of cognitive impairment?   No            HEALTH RISK ASSESSMENT    Smoking Status:  Social History     Tobacco Use   Smoking Status Never   Smokeless Tobacco Never     Alcohol Consumption:  Social History     Substance and Sexual Activity "   Alcohol Use Yes    Comment: social     Fall Risk Screen:    KONSTANTIN Fall Risk Assessment was completed, and patient is at LOW risk for falls.Assessment completed on:2023    Depression Screenin/12/2023     9:34 AM   PHQ-2/PHQ-9 Depression Screening   Little Interest or Pleasure in Doing Things 0-->not at all   Feeling Down, Depressed or Hopeless 1-->several days   PHQ-9: Brief Depression Severity Measure Score 1       Health Habits and Functional and Cognitive Screenin/12/2023     9:00 AM   Functional & Cognitive Status   Do you have difficulty preparing food and eating? Yes   Do you have difficulty bathing yourself, getting dressed or grooming yourself? No   Do you have difficulty using the toilet? No   Do you have difficulty moving around from place to place? Yes   Do you have trouble with steps or getting out of a bed or a chair? Yes   Current Diet Unhealthy Diet   Dental Exam Up to date   Eye Exam Up to date   Exercise (times per week) 1 times per week   Current Exercises Include Taekwondo   Do you need help using the phone?  No   Are you deaf or do you have serious difficulty hearing?  No   Do you need help with transportation? No   Do you need help shopping? No   Do you need help preparing meals?  Yes   Do you need help with housework?  No   Do you need help with laundry? No   Do you need help taking your medications? No   Do you need help managing money? No   Do you ever drive or ride in a car without wearing a seat belt? No   Have you felt unusual stress, anger or loneliness in the last month? Yes   Who do you live with? Alone   If you need help, do you have trouble finding someone available to you? Yes   Have you been bothered in the last four weeks by sexual problems? No   Do you have difficulty concentrating, remembering or making decisions? Yes       Age-appropriate Screening Schedule:  Refer to the list below for future screening recommendations based on patient's age, sex and/or  medical conditions. Orders for these recommended tests are listed in the plan section. The patient has been provided with a written plan.    Health Maintenance   Topic Date Due   • DXA SCAN  Never done   • TDAP/TD VACCINES (1 - Tdap) Never done   • ZOSTER VACCINE (1 of 2) Never done   • Pneumococcal Vaccine 65+ (1 - PCV) Never done   • COVID-19 Vaccine (4 - Booster for Moderna series) 06/21/2023 (Originally 12/23/2021)   • INFLUENZA VACCINE  08/01/2023   • ANNUAL WELLNESS VISIT  05/12/2024                  CMS Preventative Services Quick Reference  Risk Factors Identified During Encounter:    fall risk due to scoliosis    The above risks/problems have been discussed with the patient.  Pertinent information has been shared with the patient in the After Visit Summary.    Diagnoses and all orders for this visit:    1. Other idiopathic scoliosis, unspecified spinal region (Primary)  Assessment & Plan:  Referral to the [facility] for brace. She was encouraged to continue [place] [employer] as long as it is not painful.     Orders:  -     Miscellaneous DME    2. Kyphosis, unspecified kyphosis type, unspecified spinal region  -     Miscellaneous DME    3. Gastroesophageal reflux disease, unspecified whether esophagitis present  Assessment & Plan:  Prevacid 30 mg daily prescribed. She has been on this dose for 3 weeks and has not noticed a difference yet. I will place a referral for her to be fitted for a brace with the hopes that it will take some pressure off her stomach. She was reassured that peanut butter is fine, 4 - 8 ounces of apple juice is fine, but she should consider removing Pepsi from her diet. A wedge pillow was recommended for sleeping. She was offered a referral for an EGD, but declined. The patient was cautioned to avoid foods that are spicy, tomato-based, or greasy.      4. Postmenopause  -     DEXA Bone Density Axial; Future    5. Encounter for annual wellness visit (AWV) in Medicare patient  Comments:  TOMMY  scan ordered. She was encouraged to continue taking her calcium supplement for now.    6. Allergic rhinitis, unspecified seasonality, unspecified trigger  Comments:  She is not taking anything for allergies. Her voice improved after being indoors more.     7. Impacted cerumen of left ear  Comments:  Ear lavage performed today.    Other orders  -     lansoprazole (PREVACID) 30 MG capsule; Take 1 capsule by mouth Daily. Please ensure this is the dissolvable pill  Dispense: 90 capsule; Refill: 1      Follow Up:   Next Medicare Wellness visit to be scheduled in 1 year.      An After Visit Summary and PPPS were made available to the patient.           No

## 2023-05-12 NOTE — PROGRESS NOTES
Chief Complaint  Heartburn (2 month follow up ), Right foot pain, Medicare Wellness-subsequent, and Hoarse (3-4 month worsened)    Subjective          Emi Ball presents to [facility]  History of Present Illness     Emi Ball is an 87-year-old female who presents today for a follow-up visit. She consents to the use of CHITO.    Acid reflux  The patient has been experiencing acid reflux. She notes that she loses her voice all the time. A friend suggested she try a higher dose of her medicine, so she took 30 mg instead of the 15 mg she was taking. She has not noticed a difference. She has been taking the higher dose for approximately 3 weeks. The patient reports that her children have told her she needs to stop drinking apple juice due to her reflux. They have also told her to stop drinking Pepsi and eating peanut butter. She feels like her reflux gets worse at night. At times, she thinks she is going to choke. She does not prop herself up at night. She states that she does not really want to have a scope performed.    Scoliosis and kyphosis  The patient states that she thinks her scoliosis is getting worse. She has a [facility] therapist who sees her once a week for 20 minutes. The patient states that she can tell the difference. She feels stronger and less tense. The patient states that she thinks she needs a massage as well. She is not interested in surgery.    Fragile nails  The patient states that her nails are breaking. She takes a calcium supplement. She does not take biotin.     Seasonal allergies  She notes that her voice was better when she had family visiting and she was indoors more. She did not do anything specific to help her voice. The patient states that sometimes her left ear feels like it is blocked up. She is not taking allergy medication.    right leg pain  The patient states that she still has intermittent problems with her right leg. She adds  "that she got better shoes yesterday. Her leg starts hurting at night. The pain is in the back of her leg. It does not hurt for long. By the time she gets irritated or moves it on it, the pain goes away.    Health maintenance  The patient states that she feels worse than last year. She notes that her mental health is worse. The patient has not been in the hospital overnight during the last year. She takes a baby aspirin a couple of times a week. The patient does not have an advanced care plan. If she can not make her own health decisions, she would probably choose her daughter to make them for her. The patient does not have any memory concerns. She notes that she forgets some things.    She believes she had a pneumonia vaccine at [facility].     Objective   Vital Signs:   /78 (BP Location: Right arm, Patient Position: Sitting, Cuff Size: Adult)   Pulse 74   Temp 98 °F (36.7 °C)   Resp 20   Ht 152.4 cm (60\")   Wt 44.4 kg (97 lb 12.8 oz)   SpO2 95%   BMI 19.10 kg/m²     Physical Exam  Vitals reviewed.   Constitutional:       Appearance: Normal appearance. She is well-developed.   HENT:      Head: Normocephalic and atraumatic.      Right Ear: External ear normal.      Left Ear: External ear normal. There is impacted cerumen.   Eyes:      Conjunctiva/sclera: Conjunctivae normal.      Pupils: Pupils are equal, round, and reactive to light.   Cardiovascular:      Rate and Rhythm: Normal rate and regular rhythm.      Heart sounds: No murmur heard.    No friction rub. No gallop.   Pulmonary:      Effort: Pulmonary effort is normal.      Breath sounds: Normal breath sounds. No wheezing or rhonchi.   Skin:     General: Skin is warm and dry.   Neurological:      Mental Status: She is alert and oriented to person, place, and time.      Cranial Nerves: No cranial nerve deficit.   Psychiatric:         Mood and Affect: Affect normal.         Behavior: Behavior normal.         Thought Content: Thought content normal.      "   Result Review :       Common labs        9/21/2022    11:29 2/21/2023    12:58   Common Labs   Glucose 99   91     BUN 13   14     Creatinine 0.52   0.48     Sodium 144   139     Potassium 4.9   3.8     Chloride 102   102     Calcium 9.3   9.7     Albumin 4.30   4.4     Total Bilirubin 1.0   0.9     Alkaline Phosphatase 57   60     AST (SGOT) 26   28     ALT (SGPT) 19   18     WBC 4.42   4.73     Hemoglobin 13.8   14.0     Hematocrit 42.0   42.0     Platelets 209   228         Results for orders placed or performed in visit on 02/21/23   Comprehensive Metabolic Panel    Specimen: Blood   Result Value Ref Range    Glucose 91 65 - 99 mg/dL    BUN 14 8 - 23 mg/dL    Creatinine 0.48 (L) 0.57 - 1.00 mg/dL    Sodium 139 136 - 145 mmol/L    Potassium 3.8 3.5 - 5.2 mmol/L    Chloride 102 98 - 107 mmol/L    CO2 31.7 (H) 22.0 - 29.0 mmol/L    Calcium 9.7 8.6 - 10.5 mg/dL    Total Protein 7.0 6.0 - 8.5 g/dL    Albumin 4.4 3.5 - 5.2 g/dL    ALT (SGPT) 18 1 - 33 U/L    AST (SGOT) 28 1 - 32 U/L    Alkaline Phosphatase 60 39 - 117 U/L    Total Bilirubin 0.9 0.0 - 1.2 mg/dL    Globulin 2.6 gm/dL    A/G Ratio 1.7 g/dL    BUN/Creatinine Ratio 29.2 (H) 7.0 - 25.0    Anion Gap 5.3 5.0 - 15.0 mmol/L    eGFR 91.8 >60.0 mL/min/1.73   Iron Profile    Specimen: Blood   Result Value Ref Range    Iron 134 37 - 145 mcg/dL    Iron Saturation 37 20 - 50 %    Transferrin 245 200 - 360 mg/dL    TIBC 365 298 - 536 mcg/dL   Vitamin B12    Specimen: Blood   Result Value Ref Range    Vitamin B-12 981 (H) 211 - 946 pg/mL   Folate    Specimen: Blood   Result Value Ref Range    Folate >20.00 4.78 - 24.20 ng/mL   TSH    Specimen: Blood   Result Value Ref Range    TSH 0.993 0.270 - 4.200 uIU/mL   CBC Auto Differential    Specimen: Blood   Result Value Ref Range    WBC 4.73 3.40 - 10.80 10*3/mm3    RBC 4.48 3.77 - 5.28 10*6/mm3    Hemoglobin 14.0 12.0 - 15.9 g/dL    Hematocrit 42.0 34.0 - 46.6 %    MCV 93.8 79.0 - 97.0 fL    MCH 31.3 26.6 - 33.0 pg     MCHC 33.3 31.5 - 35.7 g/dL    RDW 12.5 12.3 - 15.4 %    RDW-SD 42.9 37.0 - 54.0 fl    MPV 10.2 6.0 - 12.0 fL    Platelets 228 140 - 450 10*3/mm3    Neutrophil % 48.4 42.7 - 76.0 %    Lymphocyte % 40.4 19.6 - 45.3 %    Monocyte % 8.5 5.0 - 12.0 %    Eosinophil % 1.9 0.3 - 6.2 %    Basophil % 0.6 0.0 - 1.5 %    Immature Grans % 0.2 0.0 - 0.5 %    Neutrophils, Absolute 2.29 1.70 - 7.00 10*3/mm3    Lymphocytes, Absolute 1.91 0.70 - 3.10 10*3/mm3    Monocytes, Absolute 0.40 0.10 - 0.90 10*3/mm3    Eosinophils, Absolute 0.09 0.00 - 0.40 10*3/mm3    Basophils, Absolute 0.03 0.00 - 0.20 10*3/mm3    Immature Grans, Absolute 0.01 0.00 - 0.05 10*3/mm3    nRBC 0.0 0.0 - 0.2 /100 WBC     The patient's laboratory results from 02/2023 were reviewed with her.       Procedures        Assessment and Plan    Diagnoses and all orders for this visit:    1. Other idiopathic scoliosis, unspecified spinal region (Primary)  Assessment & Plan:  Referral to the [facility] for brace. She was encouraged to continue Japanese Chi as long as it is not painful.     Orders:  -     Miscellaneous DME    2. Kyphosis, unspecified kyphosis type, unspecified spinal region  -     Miscellaneous DME    3. Gastroesophageal reflux disease, unspecified whether esophagitis present  Assessment & Plan:  Prevacid 30 mg daily prescribed. She has been on this dose for 3 weeks and has not noticed a difference yet. I will place a referral for her to be fitted for a brace with the hopes that it will take some pressure off her stomach. She was reassured that peanut butter is fine, 4 - 8 ounces of apple juice is fine, but she should consider removing Pepsi from her diet. A wedge pillow was recommended for sleeping. She was offered a referral for an EGD, but declined. The patient was cautioned to avoid foods that are spicy, tomato-based, or greasy.      4. Postmenopause  -     DEXA Bone Density Axial; Future    5. Encounter for annual wellness visit (AWV) in Medicare  patient  Comments:  EXA scan ordered. She was encouraged to continue taking her calcium supplement for now.    6. Allergic rhinitis, unspecified seasonality, unspecified trigger  Comments:  She is not taking anything for allergies. Her voice improved after being indoors more.     7. Impacted cerumen of left ear  Comments:  Ear lavage performed today.    Other orders  -     lansoprazole (PREVACID) 30 MG capsule; Take 1 capsule by mouth Daily. Please ensure this is the dissolvable pill  Dispense: 90 capsule; Refill: 1      BMI is within normal parameters. No other follow-up for BMI required.            Follow Up   Return in about 3 months (around 8/12/2023).  Patient was given instructions and counseling regarding her condition or for health maintenance advice. Please see specific information pulled into the AVS if appropriate.     Transcribed from ambient dictation for Yoselyn Godfrey MD by Carmen Uriostegui.  05/12/23   14:48 EDT    Patient or patient representative verbalized consent to the visit recording.  I have personally performed the services described in this document as transcribed by the above individual, and it is both accurate and complete.

## 2023-05-12 NOTE — ASSESSMENT & PLAN NOTE
Referral to the [facility] for brace. She was encouraged to continue Greek Chi as long as it is not painful.

## 2023-05-12 NOTE — PATIENT INSTRUCTIONS
Consider a massage with Brian, ask for Valerie [phone]    Call [facility] for an appt to discuss a back brace.

## 2023-05-12 NOTE — TELEPHONE ENCOUNTER
Pt called in dena upset you forgot to giver her a massage at her OV today. Per Dr. Felix notes she was given information for [facility] and to ask for Valerie   [phone]  For a massage. Not to have one in office here at the time of her visit.

## 2023-05-12 NOTE — ASSESSMENT & PLAN NOTE
Prevacid 30 mg daily prescribed. She has been on this dose for 3 weeks and has not noticed a difference yet. I will place a referral for her to be fitted for a brace with the hopes that it will take some pressure off her stomach. She was reassured that peanut butter is fine, 4 - 8 ounces of apple juice is fine, but she should consider removing Pepsi from her diet. A wedge pillow was recommended for sleeping. She was offered a referral for an EGD, but declined. The patient was cautioned to avoid foods that are spicy, tomato-based, or greasy.

## 2023-05-22 ENCOUNTER — TELEPHONE (OUTPATIENT)
Dept: INTERNAL MEDICINE | Facility: CLINIC | Age: 88
End: 2023-05-22
Payer: MEDICARE

## 2023-05-22 NOTE — TELEPHONE ENCOUNTER
Caller: Emi Ball    Relationship to patient: Self    Best call back number: [phone] (Home)    Patient is needing: PATIENT STATED HER lansoprazole (PREVACID) 30 MG capsule MEDICATION IS NOW TOO STRONG AND MAKES HER SYMPTOMS WORSE SHE WOULD LIKE TO GO BACK TO 15 MG AND HAVE IT SENT TO     [facility] [id number] - [place], [place] - [address] AT [facility] [address] - [phone]  - [phone]   [phone]

## 2023-05-26 RX ORDER — MECOBALAMIN 5000 MCG
15 TABLET,DISINTEGRATING ORAL DAILY
Qty: 30 CAPSULE | Refills: 2 | Status: SHIPPED | OUTPATIENT
Start: 2023-05-26

## 2023-08-16 ENCOUNTER — OFFICE VISIT (OUTPATIENT)
Dept: INTERNAL MEDICINE | Facility: CLINIC | Age: 88
End: 2023-08-16
Payer: MEDICARE

## 2023-08-16 VITALS
OXYGEN SATURATION: 96 % | HEART RATE: 64 BPM | BODY MASS INDEX: 15.55 KG/M2 | WEIGHT: 79.2 LBS | TEMPERATURE: 98 F | SYSTOLIC BLOOD PRESSURE: 154 MMHG | DIASTOLIC BLOOD PRESSURE: 82 MMHG | HEIGHT: 60 IN

## 2023-08-16 DIAGNOSIS — K21.9 GASTROESOPHAGEAL REFLUX DISEASE WITHOUT ESOPHAGITIS: ICD-10-CM

## 2023-08-16 DIAGNOSIS — Z23 NEED FOR VACCINATION: ICD-10-CM

## 2023-08-16 DIAGNOSIS — J30.2 SEASONAL ALLERGIES: ICD-10-CM

## 2023-08-16 DIAGNOSIS — M41.20 OTHER IDIOPATHIC SCOLIOSIS, UNSPECIFIED SPINAL REGION: Primary | ICD-10-CM

## 2023-08-16 RX ORDER — CETIRIZINE HYDROCHLORIDE 10 MG/1
TABLET ORAL
Qty: 90 TABLET | Refills: 1 | Status: SHIPPED | OUTPATIENT
Start: 2023-08-16

## 2023-08-16 NOTE — PROGRESS NOTES
"Chief Complaint  Discuss Medication (Pt wants to lansoprazole d/t side effects causing Dementia. Pt has been taking medication over 18 years.) and Hoarse (Pt states that she has been losing her voice, last 6 months. Pt feels like it getting worse. Pt not sure if its from the smoke from her neighbor who's chain smoker or from allergies.  )    Subjective       Emi Ball presents to [facility]    HPI     Patient notes that she has been experiencing a scratchy throat, constantly clearing throat.  Currently not on allergy medicine.    GERD-  pt takes lansoprazole 15 mg daily. Pt is concerned that this can cause dementia, saw it in a commercial. Pt wanted to know if she should try baking soda.     Left hip is starting to be painful from her back    Not interested in EGD at this time      Objective     Vitals:    08/16/23 1437   BP: 154/82   BP Location: Right arm   Patient Position: Sitting   Cuff Size: Small Adult   Pulse: 64   Temp: 98 øF (36.7 øC)   TempSrc: Temporal   SpO2: 96%   Weight: 35.9 kg (79 lb 3.2 oz)   Height: 152.4 cm (60\")      Wt Readings from Last 3 Encounters:   08/16/23 35.9 kg (79 lb 3.2 oz)   05/12/23 44.4 kg (97 lb 12.8 oz)   03/10/23 36.9 kg (81 lb 6.4 oz)      BP Readings from Last 3 Encounters:   08/16/23 154/82   05/12/23 124/78   03/10/23 138/88        Body mass index is 15.47 kg/mý.           Physical Exam  Constitutional:       Appearance: Normal appearance.   HENT:      Head: Normocephalic and atraumatic.      Right Ear: External ear normal.      Left Ear: External ear normal.      Mouth/Throat:      Mouth: Mucous membranes are moist.      Pharynx: Oropharynx is clear. No posterior oropharyngeal erythema.   Eyes:      Conjunctiva/sclera: Conjunctivae normal.   Cardiovascular:      Rate and Rhythm: Normal rate and regular rhythm.      Pulses: Normal pulses.      Heart sounds: Normal heart sounds.   Pulmonary:      Effort: Pulmonary effort " is normal.      Breath sounds: Normal breath sounds.   Musculoskeletal:      Comments: Severe scoliosis and kyphosis   Lymphadenopathy:      Cervical: No cervical adenopathy.   Skin:     General: Skin is warm and dry.   Neurological:      Mental Status: She is alert and oriented to person, place, and time.   Psychiatric:         Mood and Affect: Mood normal.         Behavior: Behavior normal.         Thought Content: Thought content normal.         Judgment: Judgment normal.        Result Review :   The following data was reviewed by: Yoselyn Godfrey MD on 08/16/2023:        Procedures    Assessment and Plan   Diagnoses and all orders for this visit:    1. Other idiopathic scoliosis, unspecified spinal region (Primary)  Comments:  Will refer to neurosurgery for further discussion she has always been resistant to this previously however as the back is significantly worsening she wants help  Orders:  -     Ambulatory Referral to Neurosurgery    2. Need for vaccination  -     Cancel: Pneumococcal Conjugate Vaccine 20-Valent All    3. Gastroesophageal reflux disease without esophagitis  Comments:  We will try decreasing lansoprazole to help with bone health  Assessment & Plan:  -Stable with lansoprazole 15 mg.    -Patient not interested in EGD at this time.      4. Seasonal allergies  Comments:  We will try Zyrtec to see if this helps with her throat  Assessment & Plan:  We will start Zyrtec.  Scratchy throat likely secondary to postnasal drip.      Other orders  -     cetirizine (zyrTEC) 10 MG tablet; One tab po daily  Dispense: 90 tablet; Refill: 1    States she will get her pneumonia vaccine at the pharmacy      Follow Up   Return in about 3 months (around 11/16/2023).  Patient was given instructions and counseling regarding her condition or for health maintenance advice. Please see specific information pulled into the AVS if appropriate.

## 2023-08-17 PROBLEM — J30.2 SEASONAL ALLERGIES: Status: ACTIVE | Noted: 2023-08-17

## 2023-08-31 ENCOUNTER — TELEPHONE (OUTPATIENT)
Dept: INTERNAL MEDICINE | Facility: CLINIC | Age: 88
End: 2023-08-31
Payer: MEDICARE

## 2023-08-31 NOTE — TELEPHONE ENCOUNTER
Patient called and stated that she went to her car yesterday and fell and that she can't move her right leg. Patient was advised to go to Urgent Care of the hospital to get xray's done.

## 2023-09-05 ENCOUNTER — TELEPHONE (OUTPATIENT)
Dept: INTERNAL MEDICINE | Facility: CLINIC | Age: 88
End: 2023-09-05
Payer: MEDICARE

## 2023-09-05 NOTE — TELEPHONE ENCOUNTER
Pt called stating that she fell last Wednesday, pt was advised to go to the ED and be seen. Pt did not go to ED and was not seen. Pt says she is having pain when standing, I again advised pt to go to ED to be evaluated.

## 2023-09-19 NOTE — TELEPHONE ENCOUNTER
[facility] Note    Chief Complaint      Chief Complaint   Patient presents with    Follow-up     History of Present Illness      Avril Martinez is a 44 y.o. female patient with chronic conditions of prediabetes who presents today for f/u with chronic conditions of weight gain, prediabetes, bmi 35.   Needs repeat labs  Has stopped drinking soda. Has not lost any weight.  Discussed the mediterranean diet    Health Maintenance   Topic Date Due    TETANUS VACCINE  Never done    Mammogram  Never done    Hepatitis C Screening  Completed    Lipid Panel  Completed       Past Medical History:   Diagnosis Date    Allergy        History reviewed. No pertinent surgical history.    family history includes Hypertension in her mother; Thyroid disease in her mother.    Social History     Tobacco Use    Smoking status: Never    Smokeless tobacco: Never   Substance Use Topics    Alcohol use: No    Drug use: Never       Review of Systems   Constitutional:  Negative for fever, malaise/fatigue and weight loss.   HENT:  Negative for hearing loss.    Eyes:  Negative for blurred vision and discharge.   Respiratory:  Negative for wheezing.    Cardiovascular:  Negative for chest pain and palpitations.   Gastrointestinal:  Negative for blood in stool, constipation, diarrhea, nausea and vomiting.   Genitourinary:  Negative for dysuria and hematuria.   Musculoskeletal:  Negative for neck pain.   Neurological:  Negative for dizziness, weakness and headaches.   Endo/Heme/Allergies:  Negative for polydipsia.        Outpatient Encounter Medications as of 9/19/2023   Medication Sig Note Dispense Refill    cetirizine (ZYRTEC) 10 MG tablet  3/6/2015: Received from: External Pharmacy  1    fluticasone (FLONASE) 50 mcg/actuation nasal spray 1 spray by Each Nare route once daily.  16 g 0    [DISCONTINUED] clotrimazole-betamethasone (LOTRISONE) lotion Apply topically 2 (two) times daily.  30 mL 0    [DISCONTINUED] naproxen (NAPROSYN)  Sounds good I signed, please send   500 MG tablet Take 1 tab BIDWM x 14 days then can take 1 tab up to BIDWM prn pain  30 tablet 1     No facility-administered encounter medications on file as of 9/19/2023.        Review of patient's allergies indicates:   Allergen Reactions    No known drug allergies        Physical Exam      Vital Signs  Pulse: 63  SpO2: (!) 94 %  BP: 110/68  BP Location: Right arm  Pain Score: 0-No pain  Height and Weight  Weight: 91.5 kg (201 lb 11.5 oz)    Physical Exam  Vitals and nursing note reviewed.   Constitutional:       General: She is not in acute distress.     Appearance: Normal appearance. She is not ill-appearing.   HENT:      Head: Normocephalic and atraumatic.   Cardiovascular:      Rate and Rhythm: Normal rate and regular rhythm.      Heart sounds: Normal heart sounds.   Pulmonary:      Effort: Pulmonary effort is normal. No respiratory distress.      Breath sounds: Normal breath sounds.   Musculoskeletal:         General: Normal range of motion.   Skin:     General: Skin is warm and dry.   Neurological:      General: No focal deficit present.      Mental Status: She is alert and oriented to person, place, and time.   Psychiatric:         Mood and Affect: Mood normal.         Behavior: Behavior normal.         Thought Content: Thought content normal.         Judgment: Judgment normal.          Laboratory:  CBC:  Lab Results   Component Value Date    WBC 5.94 03/15/2023    RBC 4.62 03/15/2023    HGB 13.2 03/15/2023    HCT 41.1 03/15/2023     03/15/2023    MCV 89 03/15/2023    MCH 28.6 03/15/2023    MCHC 32.1 03/15/2023    MCHC 33.9 01/15/2016    MCHC 33.9 09/10/2011     CMP:  Lab Results   Component Value Date     03/15/2023    CALCIUM 9.6 03/15/2023    ALBUMIN 3.6 03/15/2023    PROT 7.5 03/15/2023     03/15/2023    K 4.3 03/15/2023    CO2 27 03/15/2023     03/15/2023    BUN 10 03/15/2023    ALKPHOS 61 03/15/2023    ALT 12 03/15/2023    AST 13 03/15/2023    BILITOT 0.5 03/15/2023    BILITOT  0.6 01/15/2016    BILITOT 0.4 09/10/2011     URINALYSIS:  Lab Results   Component Value Date    COLORU Colorless (A) 03/14/2023    SPECGRAV 1.005 03/14/2023    PHUR 5.0 03/14/2023    PROTEINUA Negative 03/14/2023    BACTERIA OCC 09/06/2011    NITRITE Negative 03/14/2023    LEUKOCYTESUR Negative 03/14/2023    UROBILINOGEN Negative 01/15/2016      LIPIDS:  Lab Results   Component Value Date    TSH 1.055 03/15/2023    TSH 1.25 09/10/2011    HDL 54 09/10/2011    CHOL 183 09/10/2011    TRIG 70 09/10/2011    LDLCALC 115.0 09/10/2011    CHOLHDL 29.5 09/10/2011    TOTALCHOLEST 3.4 09/10/2011     TSH:  Lab Results   Component Value Date    TSH 1.055 03/15/2023    TSH 1.25 09/10/2011     A1C:  Lab Results   Component Value Date    HGBA1C 5.8 (H) 03/15/2023         Assessment/Plan     Avril Martinez is a 44 y.o.female with:    Prediabetes  -     Lipid Panel; Future; Expected date: 09/19/2023  -     Hemoglobin A1C; Future; Expected date: 09/19/2023  -     Comprehensive Metabolic Panel; Future; Expected date: 09/19/2023  -     CBC Auto Differential; Future; Expected date: 09/19/2023    Seasonal allergies  -     Lipid Panel; Future; Expected date: 09/19/2023  -     Hemoglobin A1C; Future; Expected date: 09/19/2023  -     Comprehensive Metabolic Panel; Future; Expected date: 09/19/2023  -     CBC Auto Differential; Future; Expected date: 09/19/2023    BMI 35.0-35.9,adult  -     Lipid Panel; Future; Expected date: 09/19/2023  -     Hemoglobin A1C; Future; Expected date: 09/19/2023  -     Comprehensive Metabolic Panel; Future; Expected date: 09/19/2023  -     CBC Auto Differential; Future; Expected date: 09/19/2023    Obesity, unspecified classification, unspecified obesity type, unspecified whether serious comorbidity present  -     Lipid Panel; Future; Expected date: 09/19/2023  -     Hemoglobin A1C; Future; Expected date: 09/19/2023  -     Comprehensive Metabolic Panel; Future; Expected date: 09/19/2023  -     [place] Auto  Differential; Future; Expected date: 09/19/2023    Screening for diabetes mellitus  -     Lipid Panel; Future; Expected date: 09/19/2023  -     Hemoglobin A1C; Future; Expected date: 09/19/2023  -     Comprehensive Metabolic Panel; Future; Expected date: 09/19/2023  -     CBC Auto Differential; Future; Expected date: 09/19/2023    Encounter for lipid screening for cardiovascular disease  -     Lipid Panel; Future; Expected date: 09/19/2023  -     Hemoglobin A1C; Future; Expected date: 09/19/2023  -     Comprehensive Metabolic Panel; Future; Expected date: 09/19/2023  -     CBC Auto Differential; Future; Expected date: 09/19/2023    Encounter for screening mammogram for malignant neoplasm of breast  -     Mammo Digital Screening Bilat w/ Jorge Alberto; Future; Expected date: 09/19/2023          Health Maintenance Due   Topic Date Due    Cervical Cancer Screening  Never done    TETANUS VACCINE  Never done    Mammogram  Never done    Influenza Vaccine (1) 09/01/2023        I spent 38 minutes on the day of this encounter for preparing for, evaluating, treating, and managing this patient.      -Continue current medications and maintain follow up with specialists.  Return to clinic in 6 months or sooner for any concerns   No follow-ups on file.       YODIT Jo  [facility]

## 2023-11-17 ENCOUNTER — OFFICE VISIT (OUTPATIENT)
Dept: INTERNAL MEDICINE | Facility: CLINIC | Age: 88
End: 2023-11-17
Payer: MEDICARE

## 2023-11-17 VITALS
WEIGHT: 80 LBS | TEMPERATURE: 97.5 F | BODY MASS INDEX: 15.71 KG/M2 | OXYGEN SATURATION: 95 % | SYSTOLIC BLOOD PRESSURE: 112 MMHG | HEART RATE: 75 BPM | DIASTOLIC BLOOD PRESSURE: 72 MMHG | HEIGHT: 60 IN

## 2023-11-17 DIAGNOSIS — R22.41 LOCALIZED SWELLING OF RIGHT FOOT: ICD-10-CM

## 2023-11-17 DIAGNOSIS — K21.9 GASTROESOPHAGEAL REFLUX DISEASE, UNSPECIFIED WHETHER ESOPHAGITIS PRESENT: ICD-10-CM

## 2023-11-17 DIAGNOSIS — M41.9 SEVERE SCOLIOSIS: Primary | ICD-10-CM

## 2023-11-17 DIAGNOSIS — J30.9 ALLERGIC RHINITIS, UNSPECIFIED SEASONALITY, UNSPECIFIED TRIGGER: ICD-10-CM

## 2023-11-17 PROCEDURE — 99213 OFFICE O/P EST LOW 20 MIN: CPT | Performed by: INTERNAL MEDICINE

## 2023-11-17 RX ORDER — CETIRIZINE HYDROCHLORIDE 5 MG/1
10 TABLET ORAL DAILY
Qty: 150 ML | Refills: 2 | Status: SHIPPED | OUTPATIENT
Start: 2023-11-17

## 2023-11-17 RX ORDER — FAMOTIDINE 40 MG/1
40 TABLET, FILM COATED ORAL DAILY
Qty: 90 TABLET | Refills: 1 | Status: SHIPPED | OUTPATIENT
Start: 2023-11-17

## 2023-11-17 NOTE — PROGRESS NOTES
"Chief Complaint  Scoliosis and gerd    Subjective      History of Present Illness  Emi Ball is a 88 y.o. female who presents to [facility] with a past medical history of  Past Medical History:   Diagnosis Date    Gastroesophageal reflux     GERD (gastroesophageal reflux disease)     Myocardial infarction     Otalgia     Pain head     Scoliosis 05/04/2020    Vitamin D deficiency        She hasn't been able to see the spine surgeon yet  States that she is still noticing significant reflux    Objective   Vital Signs:   Vitals:    11/17/23 1535   BP: 112/72   Pulse: 75   Temp: 97.5 °F (36.4 °C)   TempSrc: Temporal   SpO2: 95%   Weight: 36.3 kg (80 lb)   Height: 152.4 cm (60\")     Body mass index is 15.62 kg/m².    Wt Readings from Last 3 Encounters:   11/17/23 36.3 kg (80 lb)   08/16/23 35.9 kg (79 lb 3.2 oz)   05/12/23 44.4 kg (97 lb 12.8 oz)     BP Readings from Last 3 Encounters:   11/17/23 112/72   08/16/23 154/82   05/12/23 124/78       Health Maintenance   Topic Date Due    DXA SCAN  Never done    TDAP/TD VACCINES (1 - Tdap) Never done    ZOSTER VACCINE (1 of 2) Never done    Pneumococcal Vaccine 65+ (1 - PCV) Never done    BMI FOLLOWUP  10/27/2022    COVID-19 Vaccine (4 - 2023-24 season) 09/01/2023    ANNUAL WELLNESS VISIT  05/12/2024    INFLUENZA VACCINE  Completed       Physical Exam  Vitals reviewed.   Constitutional:       Appearance: Normal appearance. She is well-developed.   HENT:      Head: Normocephalic and atraumatic.      Right Ear: External ear normal.      Left Ear: External ear normal.   Eyes:      Conjunctiva/sclera: Conjunctivae normal.      Pupils: Pupils are equal, round, and reactive to light.   Cardiovascular:      Rate and Rhythm: Normal rate and regular rhythm.      Heart sounds: No murmur heard.     No friction rub. No gallop.   Pulmonary:      Effort: Pulmonary effort is normal.      Breath sounds: Normal breath sounds. No wheezing or " rhonchi.   Skin:     General: Skin is warm and dry.   Neurological:      Mental Status: She is alert and oriented to person, place, and time.   Psychiatric:         Mood and Affect: Affect normal.         Behavior: Behavior normal.         Thought Content: Thought content normal.            Result Review :  The following data was reviewed by: Yoselyn Godfrey MD on 11/17/2023:      Procedures        Assessment and Plan   Diagnoses and all orders for this visit:    1. Severe scoliosis (Primary)  Comments:  will try to get her back in with the neurosurgeon    2. Localized swelling of right foot  Comments:  discussed that it is likely venous stasis in nature    3. Gastroesophageal reflux disease, unspecified whether esophagitis present  Comments:  continue PPI, can add h 2 blocker if needed, unfortunately like pressure from stomach    4. Allergic rhinitis, unspecified seasonality, unspecified trigger    Other orders  -     famotidine (Pepcid) 40 MG tablet; Take 1 tablet by mouth Daily.  Dispense: 90 tablet; Refill: 1  -     Cetirizine HCl (zyrTEC) 5 MG/5ML solution solution; Take 10 mL by mouth Daily.  Dispense: 150 mL; Refill: 2        BMI is below normal parameters (malnutrition). Recommendations: none (medical contraindication)         FOLLOW UP  Return in about 3 months (around 2/17/2024).  Patient was given instructions and counseling regarding her condition or for health maintenance advice. Please see specific information pulled into the AVS if appropriate.       Yoselyn Godfrey MD  11/26/23  19:05 EST    CURRENT & DISCONTINUED MEDICATIONS  Current Outpatient Medications   Medication Instructions    aspirin 81 mg, Oral, 3 Times Weekly    Calcium Carb-Cholecalciferol (Caltrate 600+D3 Soft) 600-800 MG-UNIT chewable tablet No dose, route, or frequency recorded.    Cetirizine HCl (ZYRTEC) 10 mg, Oral, Daily    Coenzyme Q10 (Co Q-10) 100 MG capsule No dose, route, or frequency recorded.    famotidine (PEPCID) 40  mg, Oral, Daily    fish oil 1,000 mg, Oral, Daily With Breakfast    lansoprazole (PREVACID) 15 mg, Oral, Daily    Misc. Devices (Commode Bedside) misc Commode for extension over current toilet needed.    Multiple Vitamins-Minerals (MULTIVITAMIN ADULT EXTRA C PO) multivitamin Oral Tablet take 1 tablet by oral route once daily with food   Active    vitamin D3 5,000 Units, Oral, Daily       Medications Discontinued During This Encounter   Medication Reason    cetirizine (zyrTEC) 10 MG tablet *Therapy completed

## 2023-11-21 ENCOUNTER — TELEPHONE (OUTPATIENT)
Dept: INTERNAL MEDICINE | Facility: CLINIC | Age: 88
End: 2023-11-21
Payer: MEDICARE

## 2023-11-21 NOTE — TELEPHONE ENCOUNTER
Caller: Emi Ball    Relationship: Self    Best call back number: [phone]    What orders are you requesting (i.e. lab or imaging): LIFT CHAIR     Additional notes: PATIENT IS NEEDING ORDER FOR LIFT CHAIR

## 2023-11-22 NOTE — TELEPHONE ENCOUNTER
Called and spoke with pt she stated she is getting a paper today and will get it to us to fill out for her lift chair.

## 2023-11-28 ENCOUNTER — TELEPHONE (OUTPATIENT)
Dept: INTERNAL MEDICINE | Facility: CLINIC | Age: 88
End: 2023-11-28
Payer: MEDICARE

## 2023-11-28 NOTE — TELEPHONE ENCOUNTER
Pt called requesting letter for lift chair. Pt would like a call back discuss more about it. Pt stated she forgot to mention it to Dr. Godfrey on the last visit

## 2023-11-28 NOTE — TELEPHONE ENCOUNTER
That is likely fine, not sure if medicare will cover it but please see what she needs and what she needs it for.

## 2023-11-30 DIAGNOSIS — M41.20 OTHER IDIOPATHIC SCOLIOSIS, UNSPECIFIED SPINAL REGION: Primary | ICD-10-CM

## 2023-11-30 DIAGNOSIS — Z74.09 MOBILITY IMPAIRED: ICD-10-CM

## 2023-11-30 NOTE — Clinical Note
*[facility] just needs the order for Power Lift motor (que order) just need diagnosis.  *Pt is aware that [facility] only pays for the motor and that she needs to get an recliner. She's okay with going forward.  *Please left me know when order has been. So I can fax order. Thanks

## 2024-02-15 ENCOUNTER — TELEPHONE (OUTPATIENT)
Dept: INTERNAL MEDICINE | Facility: CLINIC | Age: 89
End: 2024-02-15
Payer: MEDICARE

## 2024-02-23 ENCOUNTER — OFFICE VISIT (OUTPATIENT)
Dept: INTERNAL MEDICINE | Facility: CLINIC | Age: 89
End: 2024-02-23
Payer: MEDICARE

## 2024-02-23 VITALS
OXYGEN SATURATION: 96 % | DIASTOLIC BLOOD PRESSURE: 80 MMHG | BODY MASS INDEX: 15.16 KG/M2 | HEART RATE: 77 BPM | HEIGHT: 60 IN | TEMPERATURE: 98.2 F | WEIGHT: 77.2 LBS | SYSTOLIC BLOOD PRESSURE: 160 MMHG

## 2024-02-23 DIAGNOSIS — R13.10 DYSPHAGIA, UNSPECIFIED TYPE: ICD-10-CM

## 2024-02-23 DIAGNOSIS — R53.83 OTHER FATIGUE: ICD-10-CM

## 2024-02-23 DIAGNOSIS — M41.9 SEVERE SCOLIOSIS: ICD-10-CM

## 2024-02-23 DIAGNOSIS — Z13.220 SCREENING, LIPID: ICD-10-CM

## 2024-02-23 DIAGNOSIS — K21.9 GASTROESOPHAGEAL REFLUX DISEASE, UNSPECIFIED WHETHER ESOPHAGITIS PRESENT: Primary | ICD-10-CM

## 2024-02-23 DIAGNOSIS — R49.0 HOARSENESS OF VOICE: ICD-10-CM

## 2024-02-23 DIAGNOSIS — R79.9 ABNORMAL FINDING OF BLOOD CHEMISTRY, UNSPECIFIED: ICD-10-CM

## 2024-02-23 DIAGNOSIS — E55.9 VITAMIN D DEFICIENCY: ICD-10-CM

## 2024-02-23 DIAGNOSIS — H92.03 OTALGIA OF BOTH EARS: ICD-10-CM

## 2024-02-23 RX ORDER — FAMOTIDINE 40 MG/1
40 TABLET, FILM COATED ORAL DAILY
Qty: 90 TABLET | Refills: 1 | Status: SHIPPED | OUTPATIENT
Start: 2024-02-23

## 2024-02-23 NOTE — PROGRESS NOTES
"Chief Complaint  Severe scoliosis (3 MONTH F/U-); Foot pain, right (Hurts a lot when going to bed that keeps her up.); Fatigue (Wanting something for energy ); Bruising on arms (Not sure where they are coming from ); and Losing her voice (Pt lose her voice all the time and not sure why)    Subjective      History of Present Illness  The patient presents for evaluation of multiple medical concerns.     She has been feeling tired and cold lately. She gets up and eats breakfast and then she is tired again. She sometimes feels refreshed when she first wakes up. She usually has no complaints with sleeping.    She thinks her vocal hoarseness is due to her reflux. Sometimes when she swallows, she gets an earache, but the earache goes away in 2 to 3 minutes. She denies any nasal drainage. She stopped taking Zyrtec because she did not want to take it too long. She sometimes has trouble swallowing. She ate and felt a horrible burning in her throat, had a cough, and choked while coughing. She thinks the reflux medicine helped when she was doing full strength, but not now    She wakes up every night with severe pain in the bottom of her foot. It gets better as the day goes on. She has tried Tylenol.    She was referred to a neurosurgeon for her scoliosis, but she never got the call about the appointment. Her back hurts her quite a bit and she is now using a walker.  She tried working with the brace facility but they told her there isn't much they could do. Her hips bother her more at night. Her hips are bothering her on both sides.         Objective   Vital Signs:   Vitals:    02/23/24 1434   BP: 160/80  Comment: d/t getting upset this morning   Pulse: 77   Temp: 98.2 °F (36.8 °C)   TempSrc: Temporal   SpO2: 96%   Weight: 35 kg (77 lb 3.2 oz)   Height: 152.4 cm (60\")     Body mass index is 15.08 kg/m².    Wt Readings from Last 3 Encounters:   02/23/24 35 kg (77 lb 3.2 oz)   11/17/23 36.3 kg (80 lb)   08/16/23 35.9 kg (79 lb 3.2 " oz)     BP Readings from Last 3 Encounters:   02/23/24 160/80   11/17/23 112/72   08/16/23 154/82       Health Maintenance   Topic Date Due    DXA SCAN  Never done    ZOSTER VACCINE (1 of 2) 02/23/2024 (Originally 10/8/1985)    BMI FOLLOWUP  02/24/2024 (Originally 10/27/2022)    COVID-19 Vaccine (4 - 2023-24 season) 02/25/2024 (Originally 9/1/2023)    Pneumococcal Vaccine 65+ (1 of 1 - PCV) 02/23/2025 (Originally 10/8/2000)    TDAP/TD VACCINES (1 - Tdap) 02/23/2025 (Originally 10/8/1954)    ANNUAL WELLNESS VISIT  05/12/2024    RSV Vaccine - Adults  Completed    INFLUENZA VACCINE  Completed       Physical Exam   Physical Exam        Result Review :  The following data was reviewed by: Yoselyn Godfrey MD on 02/23/2024:         Results        Procedures          Assessment & Plan  1. Fatigue.  I will obtain blood work.    2. Hoarseness.  It is common to have vocal hoarseness with reflux. She will restart her Zyrtec.    3. Acid reflux.  She will restart Prevacid 15 mg daily.    4. Ear pain.  It is probably from her eustachian tube. Swallowing pushes the fluid down. She will try Zyrtec.    5. Plantar fasciitis.  She was advised to use a frozen water bottle and rub her foot on it. She can also take Tylenol.    6. Scoliosis.  We will refer her to a neurosurgeon.  We discussed she isn't the best surgical candidate, but we were wondering if there is anything else from which she might benefit, bracing, injections, etc    7. Bruising.  I will obtain blood work.    Vit d def  I will check her vitamin D level.    Assessment & Plan  Other fatigue    Hoarseness of voice    Otalgia of both ears    Gastroesophageal reflux disease, unspecified whether esophagitis present    Dysphagia, unspecified type    Vitamin D deficiency    Screening, lipid    Abnormal finding of blood chemistry, unspecified    Severe scoliosis      Orders Placed This Encounter   Procedures    Comprehensive Metabolic Panel    TSH    Lipid Panel    Vitamin  D,25-Hydroxy    Vitamin B12 & Folate    Iron Profile    Magnesium    CBC & Differential     New Medications Ordered This Visit   Medications    famotidine (Pepcid) 40 MG tablet     Sig: Take 1 tablet by mouth Daily.     Dispense:  90 tablet     Refill:  1                 FOLLOW UP  Return in about 3 months (around 5/23/2024).  Patient was given instructions and counseling regarding her condition or for health maintenance advice. Please see specific information pulled into the AVS if appropriate.       Yoselyn Godfrey MD  02/23/24  18:32 EST    CURRENT & DISCONTINUED MEDICATIONS  Current Outpatient Medications   Medication Instructions    aspirin 81 mg, Oral, 3 Times Weekly    Calcium Carb-Cholecalciferol (Caltrate 600+D3 Soft) 600-800 MG-UNIT chewable tablet No dose, route, or frequency recorded.    Cetirizine HCl (ZYRTEC) 10 mg, Oral, Daily    Coenzyme Q10 (Co Q-10) 100 MG capsule No dose, route, or frequency recorded.    famotidine (PEPCID) 40 mg, Oral, Daily    fish oil 1,000 mg, Oral, Daily With Breakfast    lansoprazole (PREVACID SOLUTAB) 15 mg, Oral, Daily    Multiple Vitamins-Minerals (MULTIVITAMIN ADULT EXTRA C PO) multivitamin Oral Tablet take 1 tablet by oral route once daily with food   Active    vitamin D3 5,000 Units, Oral, Daily       Medications Discontinued During This Encounter   Medication Reason    lansoprazole (Prevacid) 15 MG capsule Duplicate order    Misc. Devices (Commode Bedside) misc     famotidine (Pepcid) 40 MG tablet Reorder        Patient or patient representative verbalized consent for the use of Ambient Listening during the visit with  Yoselyn Godfrey MD for chart documentation. 2/23/2024  18:31 EST

## 2024-02-27 ENCOUNTER — TELEPHONE (OUTPATIENT)
Dept: INTERNAL MEDICINE | Facility: CLINIC | Age: 89
End: 2024-02-27
Payer: MEDICARE

## 2024-02-27 RX ORDER — CETIRIZINE HYDROCHLORIDE 5 MG/1
10 TABLET ORAL DAILY
Qty: 150 ML | Refills: 2 | Status: SHIPPED | OUTPATIENT
Start: 2024-02-27

## 2024-02-27 NOTE — TELEPHONE ENCOUNTER
Caller: Emi Ball    Relationship: Self    Best call back number: [phone]     Requested Prescriptions:   Requested Prescriptions     Pending Prescriptions Disp Refills    lansoprazole (PREVACID SOLUTAB) 15 MG Tablet Delayed Release Dispersible disintegrating tablet 30 tablet      Sig: Take 1 tablet by mouth Daily.    Cetirizine HCl (zyrTEC) 5 MG/5ML solution solution 150 mL 2     Sig: Take 10 mL by mouth Daily.        Pharmacy where request should be sent: [facility] [id number]  [place] - [address] AT [facility] [address] - [phone] [facility] [phone] FX     Last office visit with prescribing clinician: 2/23/2024   Last telemedicine visit with prescribing clinician: Visit date not found   Next office visit with prescribing clinician: 5/24/2024     Does the patient have less than a 3 day supply:  [] Yes  [] No    Would you like a call back once the refill request has been completed: [] Yes [] No    If the office needs to give you a call back, can they leave a voicemail: [] Yes [] No    Kal Osborn Rep   02/27/24 14:28 EST

## 2024-03-08 ENCOUNTER — LAB (OUTPATIENT)
Dept: LAB | Facility: HOSPITAL | Age: 89
End: 2024-03-08
Payer: MEDICARE

## 2024-03-08 LAB
25(OH)D3 SERPL-MCNC: 87.3 NG/ML (ref 30–100)
ALBUMIN SERPL-MCNC: 4.3 G/DL (ref 3.5–5.2)
ALBUMIN/GLOB SERPL: 2 G/DL
ALP SERPL-CCNC: 50 U/L (ref 39–117)
ALT SERPL W P-5'-P-CCNC: 16 U/L (ref 1–33)
ANION GAP SERPL CALCULATED.3IONS-SCNC: 8 MMOL/L (ref 5–15)
AST SERPL-CCNC: 26 U/L (ref 1–32)
BASOPHILS # BLD AUTO: 0.03 10*3/MM3 (ref 0–0.2)
BASOPHILS NFR BLD AUTO: 0.7 % (ref 0–1.5)
BILIRUB SERPL-MCNC: 1.2 MG/DL (ref 0–1.2)
BUN SERPL-MCNC: 19 MG/DL (ref 8–23)
BUN/CREAT SERPL: 37.3 (ref 7–25)
CALCIUM SPEC-SCNC: 9.5 MG/DL (ref 8.6–10.5)
CHLORIDE SERPL-SCNC: 103 MMOL/L (ref 98–107)
CHOLEST SERPL-MCNC: 290 MG/DL (ref 0–200)
CO2 SERPL-SCNC: 33 MMOL/L (ref 22–29)
CREAT SERPL-MCNC: 0.51 MG/DL (ref 0.57–1)
DEPRECATED RDW RBC AUTO: 43.4 FL (ref 37–54)
EGFRCR SERPLBLD CKD-EPI 2021: 89.9 ML/MIN/1.73
EOSINOPHIL # BLD AUTO: 0.15 10*3/MM3 (ref 0–0.4)
EOSINOPHIL NFR BLD AUTO: 3.3 % (ref 0.3–6.2)
ERYTHROCYTE [DISTWIDTH] IN BLOOD BY AUTOMATED COUNT: 12.8 % (ref 12.3–15.4)
FOLATE SERPL-MCNC: >20 NG/ML (ref 4.78–24.2)
GLOBULIN UR ELPH-MCNC: 2.1 GM/DL
GLUCOSE SERPL-MCNC: 96 MG/DL (ref 65–99)
HCT VFR BLD AUTO: 40.5 % (ref 34–46.6)
HDLC SERPL-MCNC: 106 MG/DL (ref 40–60)
HGB BLD-MCNC: 13.8 G/DL (ref 12–15.9)
IMM GRANULOCYTES # BLD AUTO: 0 10*3/MM3 (ref 0–0.05)
IMM GRANULOCYTES NFR BLD AUTO: 0 % (ref 0–0.5)
IRON 24H UR-MRATE: 107 MCG/DL (ref 37–145)
IRON SATN MFR SERPL: 31 % (ref 20–50)
LDLC SERPL CALC-MCNC: 173 MG/DL (ref 0–100)
LDLC/HDLC SERPL: 1.6 {RATIO}
LYMPHOCYTES # BLD AUTO: 2.03 10*3/MM3 (ref 0.7–3.1)
LYMPHOCYTES NFR BLD AUTO: 44.1 % (ref 19.6–45.3)
MAGNESIUM SERPL-MCNC: 2.1 MG/DL (ref 1.6–2.4)
MCH RBC QN AUTO: 31.4 PG (ref 26.6–33)
MCHC RBC AUTO-ENTMCNC: 34.1 G/DL (ref 31.5–35.7)
MCV RBC AUTO: 92.3 FL (ref 79–97)
MONOCYTES # BLD AUTO: 0.37 10*3/MM3 (ref 0.1–0.9)
MONOCYTES NFR BLD AUTO: 8 % (ref 5–12)
NEUTROPHILS NFR BLD AUTO: 2.02 10*3/MM3 (ref 1.7–7)
NEUTROPHILS NFR BLD AUTO: 43.9 % (ref 42.7–76)
NRBC BLD AUTO-RTO: 0 /100 WBC (ref 0–0.2)
PLATELET # BLD AUTO: 203 10*3/MM3 (ref 140–450)
PMV BLD AUTO: 10.2 FL (ref 6–12)
POTASSIUM SERPL-SCNC: 3.8 MMOL/L (ref 3.5–5.2)
PROT SERPL-MCNC: 6.4 G/DL (ref 6–8.5)
RBC # BLD AUTO: 4.39 10*6/MM3 (ref 3.77–5.28)
SODIUM SERPL-SCNC: 144 MMOL/L (ref 136–145)
TIBC SERPL-MCNC: 346 MCG/DL (ref 298–536)
TRANSFERRIN SERPL-MCNC: 232 MG/DL (ref 200–360)
TRIGL SERPL-MCNC: 72 MG/DL (ref 0–150)
TSH SERPL DL<=0.05 MIU/L-ACNC: 1.11 UIU/ML (ref 0.27–4.2)
VIT B12 BLD-MCNC: 979 PG/ML (ref 211–946)
VLDLC SERPL-MCNC: 11 MG/DL (ref 5–40)
WBC NRBC COR # BLD AUTO: 4.6 10*3/MM3 (ref 3.4–10.8)

## 2024-03-08 PROCEDURE — 80053 COMPREHEN METABOLIC PANEL: CPT | Performed by: INTERNAL MEDICINE

## 2024-03-08 PROCEDURE — 84443 ASSAY THYROID STIM HORMONE: CPT | Performed by: INTERNAL MEDICINE

## 2024-03-08 PROCEDURE — 84466 ASSAY OF TRANSFERRIN: CPT | Performed by: INTERNAL MEDICINE

## 2024-03-08 PROCEDURE — 83735 ASSAY OF MAGNESIUM: CPT | Performed by: INTERNAL MEDICINE

## 2024-03-08 PROCEDURE — 85025 COMPLETE CBC W/AUTO DIFF WBC: CPT | Performed by: INTERNAL MEDICINE

## 2024-03-08 PROCEDURE — 80061 LIPID PANEL: CPT | Performed by: INTERNAL MEDICINE

## 2024-03-08 PROCEDURE — 82306 VITAMIN D 25 HYDROXY: CPT | Performed by: INTERNAL MEDICINE

## 2024-03-08 PROCEDURE — 82607 VITAMIN B-12: CPT | Performed by: INTERNAL MEDICINE

## 2024-03-08 PROCEDURE — 82746 ASSAY OF FOLIC ACID SERUM: CPT | Performed by: INTERNAL MEDICINE

## 2024-03-08 PROCEDURE — 83540 ASSAY OF IRON: CPT | Performed by: INTERNAL MEDICINE

## 2024-03-21 ENCOUNTER — TELEPHONE (OUTPATIENT)
Dept: INTERNAL MEDICINE | Facility: CLINIC | Age: 89
End: 2024-03-21

## 2024-03-21 NOTE — TELEPHONE ENCOUNTER
Caller: mEi Ball    Relationship: Self    Best call back number: [phone]     What form or medical record are you requesting: PAPERWORK FOR NEW HIGHER TOILET     How would you like to receive the form or medical records (pick-up, mail, fax):   If fax, what is the fax number: [phone]     Timeframe paperwork needed: ASAP     Additional notes: PATIENT STATES SHE ALMOST HAS FALLEN MULTIPLE TIME DUE TO NEEDING A HIGHER COMMODE. PLEASE ADVISE

## 2024-03-27 ENCOUNTER — TELEPHONE (OUTPATIENT)
Dept: INTERNAL MEDICINE | Facility: CLINIC | Age: 89
End: 2024-03-27

## 2024-03-27 NOTE — TELEPHONE ENCOUNTER
Caller: Emi Ball    Relationship: Self    Best call back number: [phone]     What medication are you requesting: TO TREAT SYMPTOMS    What are your current symptoms: CAN'T HAVE BOWEL MOVEMENT    How long have you been experiencing symptoms: 4 DAYS    Have you had these symptoms before:    [x] Yes  [] No    Have you been treated for these symptoms before:   [x] Yes  [] No    If a prescription is needed, what is your preferred pharmacy and phone number: [facility] [id number] - [place] - [address] AT [facility] [address] - [phone] [facility] [phone] FX     Additional notes: PATIENT HAS TRIED MIRALAX, BUT IT IS NOT WORKING    PLEASE CALL AND ADVISE

## 2024-03-28 RX ORDER — AMOXICILLIN 250 MG
1 CAPSULE ORAL DAILY
Qty: 90 TABLET | Refills: 1 | Status: SHIPPED | OUTPATIENT
Start: 2024-03-28

## 2024-03-28 NOTE — TELEPHONE ENCOUNTER
Please call and let her know that I sent in two medicines.  One is a pill and one is a liquid.  The liquid is very strong, so if she uses it she might want to try just a half.  The pill works pretty well but can cause some cramping.  If she is still having issues she should be seen.

## 2024-03-28 NOTE — TELEPHONE ENCOUNTER
Called and spoke with pt, explained to pt two medications have been sent in and if the constipation doesn't get any better she would need an apt, explained to pt the liquid is strong and she may want to take half a dose. Pt verbalized understanding, pt asked about her request for a higher toilet, looked in patients records,  Been ordered a higher commode in 2021, pt stated she does not have a higher toilet so she does not know what happened with that. I will follow up with that, pt verbalized understanding and had no further questions at this time.

## 2024-04-02 DIAGNOSIS — M41.20 OTHER IDIOPATHIC SCOLIOSIS, UNSPECIFIED SPINAL REGION: Primary | ICD-10-CM

## 2024-04-02 NOTE — TELEPHONE ENCOUNTER
Called and spoke with pt, explained to pt the number that was listed was a number for an apartment complex in [place], asked pt if she uses [facility] or [facility], pt stated she does not use either one of those and she needs to speak with her Manager and she will let me know what route she is wanting to take.

## 2024-05-24 ENCOUNTER — OFFICE VISIT (OUTPATIENT)
Dept: INTERNAL MEDICINE | Facility: CLINIC | Age: 89
End: 2024-05-24
Payer: MEDICARE

## 2024-05-24 VITALS
TEMPERATURE: 98.6 F | HEIGHT: 60 IN | OXYGEN SATURATION: 97 % | BODY MASS INDEX: 15.12 KG/M2 | SYSTOLIC BLOOD PRESSURE: 146 MMHG | DIASTOLIC BLOOD PRESSURE: 90 MMHG | HEART RATE: 74 BPM | WEIGHT: 77 LBS | RESPIRATION RATE: 14 BRPM

## 2024-05-24 DIAGNOSIS — R60.0 BILATERAL LOWER EXTREMITY EDEMA: Primary | ICD-10-CM

## 2024-05-24 DIAGNOSIS — E78.00 ELEVATED LDL CHOLESTEROL LEVEL: ICD-10-CM

## 2024-05-24 DIAGNOSIS — I10 PRIMARY HYPERTENSION: ICD-10-CM

## 2024-05-24 DIAGNOSIS — R79.89 ELEVATED BRAIN NATRIURETIC PEPTIDE (BNP) LEVEL: ICD-10-CM

## 2024-05-24 DIAGNOSIS — R06.09 DYSPNEA ON EXERTION: ICD-10-CM

## 2024-05-24 PROCEDURE — 99214 OFFICE O/P EST MOD 30 MIN: CPT | Performed by: INTERNAL MEDICINE

## 2024-05-24 PROCEDURE — 1125F AMNT PAIN NOTED PAIN PRSNT: CPT | Performed by: INTERNAL MEDICINE

## 2024-05-24 PROCEDURE — G0439 PPPS, SUBSEQ VISIT: HCPCS | Performed by: INTERNAL MEDICINE

## 2024-05-24 RX ORDER — DOCUSATE SODIUM 50 MG/5ML
100 LIQUID ORAL DAILY
COMMUNITY
Start: 2024-05-14

## 2024-05-24 RX ORDER — LISINOPRIL AND HYDROCHLOROTHIAZIDE 20; 12.5 MG/1; MG/1
1 TABLET ORAL DAILY
Qty: 90 TABLET | Refills: 1 | Status: SHIPPED | OUTPATIENT
Start: 2024-05-24

## 2024-05-24 NOTE — PROGRESS NOTES
The ABCs of the Annual Wellness Visit  Subsequent Medicare Wellness Visit    Subjective    Emi Ball is a 88 y.o. female who presents for a Subsequent Medicare Wellness Visit.    The following portions of the patient's history were reviewed and   updated as appropriate: allergies, current medications, past family history, past medical history, past social history, past surgical history, and problem list.    Compared to one year ago, the patient feels her physical   health is worse.    Compared to one year ago, the patient feels her mental   health is worse.    Recent Hospitalizations:  She was not admitted to the hospital during the last year.       Current Medical Providers:  Patient Care Team:  Yoselyn Godfrey MD as PCP - General (Internal Medicine)    Outpatient Medications Prior to Visit   Medication Sig Dispense Refill    aspirin 81 MG EC tablet Take 1 tablet by mouth 3 (Three) Times a Week.      Calcium Carb-Cholecalciferol (Caltrate 600+D3 Soft) 600-800 MG-UNIT chewable tablet       Coenzyme Q10 (Co Q-10) 100 MG capsule       Docusate Sodium 150 MG/15ML syrup Take 10 mL by mouth Daily.      famotidine (Pepcid) 40 MG tablet Take 1 tablet by mouth Daily. 90 tablet 1    lansoprazole (PREVACID SOLUTAB) 15 MG Tablet Delayed Release Dispersible disintegrating tablet Take 1 tablet by mouth Daily. 90 tablet 2    Multiple Vitamins-Minerals (MULTIVITAMIN ADULT EXTRA C PO) multivitamin Oral Tablet take 1 tablet by oral route once daily with food   Active      Omega-3 Fatty Acids (fish oil) 1000 MG capsule capsule Take 1 capsule by mouth Daily With Breakfast.      sennosides-docusate (senna-docusate sodium) 8.6-50 MG per tablet Take 1 tablet by mouth Daily. 90 tablet 1    vitamin D3 125 MCG (5000 UT) capsule capsule Take 1 capsule by mouth Daily.      Cetirizine HCl (zyrTEC) 5 MG/5ML solution solution Take 10 mL by mouth Daily. 150 mL 2     No facility-administered medications prior to visit.       No opioid  "medication identified on active medication list. I have reviewed chart for other potential  high risk medication/s and harmful drug interactions in the elderly.        Aspirin is on active medication list. Aspirin use is indicated based on review of current medical condition/s. Pros and cons of this therapy have been discussed today. Benefits of this medication outweigh potential harm.  Patient has been encouraged to continue taking this medication.  .      Patient Active Problem List   Diagnosis    Gastroesophageal reflux    Myocardial infarction    Otalgia    Pain, head    Scoliosis    Vitamin D deficiency    Protein-calorie malnutrition, unspecified severity    Closed fracture of right inferior pubic ramus    Osteopenia    Debility    Difficulty walking    At high risk for falls    Seasonal allergies     Advance Care Planning   Advance Care Planning     Advance Directive is not on file.  ACP discussion was held with the patient during this visit. Patient does not have an advance directive, information provided.     Objective    Vitals:    05/24/24 1552   BP: 146/90   BP Location: Left arm   Patient Position: Sitting   Cuff Size: Small Adult   Pulse: 74   Resp: 14   Temp: 98.6 °F (37 °C)   TempSrc: Temporal   SpO2: 97%   Weight: 34.9 kg (77 lb)   Height: 152.4 cm (60\")   PainSc:   7   PainLoc: Generalized     Estimated body mass index is 15.04 kg/m² as calculated from the following:    Height as of this encounter: 152.4 cm (60\").    Weight as of this encounter: 34.9 kg (77 lb).    BMI is below normal parameters (malnutrition). Recommendations: none (medical contraindication)      Does the patient have evidence of cognitive impairment? No    Lab Results   Component Value Date    TRIG 72 03/08/2024     (H) 03/08/2024     (H) 03/08/2024    VLDL 11 03/08/2024        HEALTH RISK ASSESSMENT    Smoking Status:  Social History     Tobacco Use   Smoking Status Never   Smokeless Tobacco Never     Alcohol " Consumption:  Social History     Substance and Sexual Activity   Alcohol Use Yes    Comment: social     Fall Risk Screen:    KONSTANTIN Fall Risk Assessment was completed, and patient is at MODERATE risk for falls. Assessment completed on:2024    Depression Screenin/24/2024     3:59 PM   PHQ-2/PHQ-9 Depression Screening   Little Interest or Pleasure in Doing Things 0-->not at all   Feeling Down, Depressed or Hopeless 1-->several days   PHQ-9: Brief Depression Severity Measure Score 1       Health Habits and Functional and Cognitive Screenin/24/2024     4:00 PM   Functional & Cognitive Status   Do you have difficulty preparing food and eating? Yes   Do you have difficulty bathing yourself, getting dressed or grooming yourself? Yes   Do you have difficulty using the toilet? Yes   Do you have difficulty moving around from place to place? Yes   Do you have trouble with steps or getting out of a bed or a chair? Yes   Current Diet Well Balanced Diet   Dental Exam Not up to date   Eye Exam Not up to date   Exercise (times per week) 1 times per week   Current Exercises Include Home Fitness Gym;Other   Do you need help using the phone?  No   Are you deaf or do you have serious difficulty hearing?  No   Do you need help to go to places out of walking distance? Yes   Do you need help shopping? Yes   Do you need help preparing meals?  Yes   Do you need help with housework?  Yes   Do you need help with laundry? Yes   Do you need help taking your medications? No   Do you need help managing money? No   Do you ever drive or ride in a car without wearing a seat belt? No   Have you felt unusual stress, anger or loneliness in the last month? Yes   Who do you live with? Alone   If you need help, do you have trouble finding someone available to you? No   Have you been bothered in the last four weeks by sexual problems? No   Do you have difficulty concentrating, remembering or making decisions? No       Age-appropriate  Screening Schedule:  Refer to the list below for future screening recommendations based on patient's age, sex and/or medical conditions. Orders for these recommended tests are listed in the plan section. The patient has been provided with a written plan.    Health Maintenance   Topic Date Due    DXA SCAN  Never done    ZOSTER VACCINE (1 of 2) Never done    BMI FOLLOWUP  10/27/2022    COVID-19 Vaccine (4 - 2023-24 season) 09/01/2023    Pneumococcal Vaccine 65+ (1 of 1 - PCV) 02/23/2025 (Originally 10/8/2000)    TDAP/TD VACCINES (2 - Tdap) 02/23/2025 (Originally 7/24/2015)    INFLUENZA VACCINE  08/01/2024    ANNUAL WELLNESS VISIT  05/24/2025    RSV Vaccine - Adults  Completed                  CMS Preventative Services Quick Reference  Risk Factors Identified During Encounter  None Identified  The above risks/problems have been discussed with the patient.  Pertinent information has been shared with the patient in the After Visit Summary.  An After Visit Summary and PPPS were made available to the patient.    Follow Up:   Next Medicare Wellness visit to be scheduled in 1 year.       Additional E&M Note during same encounter follows:  Patient has multiple medical problems which are significant and separately identifiable that require additional work above and beyond the Medicare Wellness Visit.      Chief Complaint  Edema    Subjective        HPI  Ms. Ball states that her back is still bothering her some and she does feel like it is worsening however she is not interested in surgery.  She has not been able to make the appointments we have gotten her to discuss bracing but she would be interested in talking to a neurosurgeon again.    She feels like her acid reflux is well-controlled at this point    She is having swelling in her lower extremities this is new from the last time she has seen me.  Reviewed recent notes and labs around this.  She is breathing well other than when she is active at that time it is hard to  "catch her breath           Objective   Vital Signs:  /90 (BP Location: Left arm, Patient Position: Sitting, Cuff Size: Small Adult)   Pulse 74   Temp 98.6 °F (37 °C) (Temporal)   Resp 14   Ht 152.4 cm (60\")   Wt 34.9 kg (77 lb)   SpO2 97%   BMI 15.04 kg/m²     Physical Exam  Vitals reviewed.   Constitutional:       Appearance: Normal appearance. She is well-developed.   HENT:      Head: Normocephalic and atraumatic.      Right Ear: External ear normal.      Left Ear: External ear normal.   Eyes:      Conjunctiva/sclera: Conjunctivae normal.      Pupils: Pupils are equal, round, and reactive to light.   Cardiovascular:      Rate and Rhythm: Normal rate and regular rhythm.      Heart sounds: No murmur heard.     No friction rub. No gallop.      Comments: Bilateral lower extremity edema 1+  Pulmonary:      Effort: Pulmonary effort is normal.      Breath sounds: Normal breath sounds. No wheezing or rhonchi.   Musculoskeletal:      Comments: Significant scoliosis and kyphosis   Skin:     General: Skin is warm and dry.   Neurological:      Mental Status: She is alert and oriented to person, place, and time.   Psychiatric:         Mood and Affect: Affect normal.         Behavior: Behavior normal.         Thought Content: Thought content normal.              ECG Scan    Date/Time: 6/10/2024 9:41 PM    Performed by: Yoselyn Godfrey MD  Authorized by: Yoselyn Godfrey MD        ECG 12 Lead    Date/Time: 6/10/2024 9:41 PM  Performed by: Yoselyn Godfrey MD    Authorized by: Yoselyn Godfrey MD  Previous ECG: no previous ECG available  Rhythm: sinus rhythm  Rate: normal  QRS axis: left    Clinical impression: normal ECG  Comments: Some changes that are likely positional from scoliosis              Assessment and Plan   Diagnoses and all orders for this visit:    1. Bilateral lower extremity edema (Primary)  Comments:  Will work her up for heart related reasons for edema  Orders:  -     ECG 12 Lead  -   "   proBNP; Future  -     Adult Transthoracic Echo Complete w/ Color, Spectral and Contrast if necessary per protocol; Future  -     XR Chest PA & Lateral; Future  -     Comprehensive Metabolic Panel  -     CBC & Differential  -     TSH  -     Lipid Panel  -     Magnesium    2. Primary hypertension  Comments:  A bit elevated today may need to make adjustments to medicine perhaps fluid related  Orders:  -     ECG 12 Lead  -     proBNP; Future  -     Adult Transthoracic Echo Complete w/ Color, Spectral and Contrast if necessary per protocol; Future  -     XR Chest PA & Lateral; Future  -     Comprehensive Metabolic Panel  -     CBC & Differential  -     TSH  -     Lipid Panel  -     Magnesium    3. Dyspnea on exertion  Comments:  Will get echo and chest x-ray  Orders:  -     proBNP; Future  -     Comprehensive Metabolic Panel  -     CBC & Differential  -     TSH  -     Lipid Panel  -     Magnesium    4. Elevated LDL cholesterol level  Comments:  Check level and treat as needed  Orders:  -     Comprehensive Metabolic Panel  -     CBC & Differential  -     TSH  -     Lipid Panel    5. Elevated brain natriuretic peptide (BNP) level  Comments:  Check level and treat as needed  Orders:  -     Comprehensive Metabolic Panel  -     CBC & Differential  -     TSH  -     Lipid Panel    Other orders  -     lisinopril-hydrochlorothiazide (Zestoretic) 20-12.5 MG per tablet; Take 1 tablet by mouth Daily.  Dispense: 90 tablet; Refill: 1             Follow Up   No follow-ups on file.  Patient was given instructions and counseling regarding her condition or for health maintenance advice. Please see specific information pulled into the AVS if appropriate.

## 2024-06-04 ENCOUNTER — TELEPHONE (OUTPATIENT)
Dept: INTERNAL MEDICINE | Facility: CLINIC | Age: 89
End: 2024-06-04
Payer: MEDICARE

## 2024-06-04 NOTE — TELEPHONE ENCOUNTER
Pt called into office today stating she needed a  letter from Dr. Godfrey because pt was trying to move into [facility]. Called and spoke with [facility] staff nurse and she explained to me pt was trying to move in and pt has already had her assessment for the facility, [facility] stated once pt has accepted a room there they will send us the paper work for Dr. Godfrey to fill out, the nurse at [facility] stated it is usually just a H&P form they request from the office but nurse stated she will request all of that information once pt has decided she is wanting to move there, I verified with [facility] staff nurse that we did not have to do anything at this current time, staff nurse confirmed she did not need anything from us at this time.

## 2024-06-10 PROCEDURE — 93000 ELECTROCARDIOGRAM COMPLETE: CPT | Performed by: INTERNAL MEDICINE

## 2024-06-26 ENCOUNTER — TELEPHONE (OUTPATIENT)
Dept: INTERNAL MEDICINE | Facility: CLINIC | Age: 89
End: 2024-06-26
Payer: MEDICARE

## 2024-06-26 NOTE — TELEPHONE ENCOUNTER
Caller: JOANN    Relationship: [facility]    Best call back number: [phone]     What is the best time to reach you: ANY    Who are you requesting to speak with (clinical staff, provider,  specific staff member): CLINICAL STAFF    What was the call regarding: JOANN WITH [facility] CALLED STATING THAT THE PATIENT IS NEEDING A OVER THE TOILET CHAIR WITH ADJUSTABLE LEGS AND ARMS. THEY HAVE NO PREFERENCE AS TO WHO IT IS CALLED INTO.

## 2024-07-01 ENCOUNTER — TELEPHONE (OUTPATIENT)
Dept: INTERNAL MEDICINE | Facility: CLINIC | Age: 89
End: 2024-07-01
Payer: MEDICARE

## 2024-07-01 NOTE — TELEPHONE ENCOUNTER
Kayla with [facility] called for verbal orders for nursing she is also wanting to make sure provider will want PT included, please advise

## 2024-07-02 NOTE — TELEPHONE ENCOUNTER
That is fine, please order what she needs specifically under misc equipment dx- back pain and balance disorder; I can sign if needed.

## 2024-07-04 ENCOUNTER — TELEPHONE (OUTPATIENT)
Dept: INTERNAL MEDICINE | Facility: CLINIC | Age: 89
End: 2024-07-04
Payer: MEDICARE

## 2024-07-04 RX ORDER — POLYETHYLENE GLYCOL 3350 17 G/17G
17 POWDER, FOR SOLUTION ORAL DAILY
Qty: 289 G | Refills: 2 | Status: SHIPPED | OUTPATIENT
Start: 2024-07-04

## 2024-07-04 NOTE — TELEPHONE ENCOUNTER
Received a page stating Miss Ball constipated for 2 weeks.    Spoke with Miss Ball over the phone.  Has been using doc/senna for past 2 days without relief.  Suggested trial of miralax (two capfuls BID for two days, followed by one capful a day).  If no benefit after two days, can consider suppository.  She voiced understanding and agreement with the plan.

## 2024-07-05 NOTE — TELEPHONE ENCOUNTER
Called and spoke with  at [facility], she stated Lorie will be out of the office until 7/11. I will try back then.

## 2024-07-08 ENCOUNTER — TELEPHONE (OUTPATIENT)
Dept: INTERNAL MEDICINE | Facility: CLINIC | Age: 89
End: 2024-07-08
Payer: MEDICARE

## 2024-07-08 NOTE — TELEPHONE ENCOUNTER
Caller: THU GONCALVES [facility]    Relationship: [facility]    Best call back number: [phone]    What orders are you requesting (i.e. lab or imaging): CONTINUE OF CARE FOR PHYSICAL THERAPY 2 TIMES A WEEK FOR TWO WEEKS AND THEN 1 WEEK 6 FOR STRENGTH     In what timeframe would the patient need to come in: N/A    Where will you receive your lab/imaging services: IN HOME

## 2024-07-08 NOTE — TELEPHONE ENCOUNTER
Caller: Emi Ball    Relationship to patient: Self    Best call back number: [phone]     Patient is needing: PATIENT CANCELLED APPOINTMENT WITH PROVIDER MARCELLO AND IS REQUESTING TO SEE DR. WALL ONLY FOR THE FOLLOWING:     Swollen feet, constipation, fatigue       PATIENT WOULD LIKE TO BE SEEN ASAP. HUB UNABLE TO SCHEDULE.    CONTACT PATIENT TO ADVISE.        [facility] NO, CALL PREFERRED NO VOICEMAIL CONTINUE TO CALL IF NO ANSWER.

## 2024-07-10 DIAGNOSIS — Z74.09 MOBILITY IMPAIRED: Primary | ICD-10-CM

## 2024-07-15 NOTE — TELEPHONE ENCOUNTER
Justin from [facility] called back into office, gave the ok for verbal order per provider.

## 2024-07-17 ENCOUNTER — TELEPHONE (OUTPATIENT)
Dept: INTERNAL MEDICINE | Facility: CLINIC | Age: 89
End: 2024-07-17
Payer: MEDICARE

## 2024-07-19 ENCOUNTER — OFFICE VISIT (OUTPATIENT)
Dept: INTERNAL MEDICINE | Facility: CLINIC | Age: 89
End: 2024-07-19
Payer: MEDICARE

## 2024-07-19 ENCOUNTER — OUTSIDE FACILITY SERVICE (OUTPATIENT)
Dept: INTERNAL MEDICINE | Facility: CLINIC | Age: 89
End: 2024-07-19
Payer: MEDICARE

## 2024-07-19 VITALS
WEIGHT: 72 LBS | OXYGEN SATURATION: 95 % | BODY MASS INDEX: 14.14 KG/M2 | SYSTOLIC BLOOD PRESSURE: 148 MMHG | HEIGHT: 60 IN | TEMPERATURE: 97.9 F | DIASTOLIC BLOOD PRESSURE: 84 MMHG | HEART RATE: 72 BPM

## 2024-07-19 DIAGNOSIS — I21.9 MYOCARDIAL INFARCTION, UNSPECIFIED MI TYPE, UNSPECIFIED ARTERY: ICD-10-CM

## 2024-07-19 DIAGNOSIS — R42 DIZZINESS: ICD-10-CM

## 2024-07-19 DIAGNOSIS — R60.0 PEDAL EDEMA: Primary | ICD-10-CM

## 2024-07-19 DIAGNOSIS — I10 PRIMARY HYPERTENSION: ICD-10-CM

## 2024-07-19 PROCEDURE — 99214 OFFICE O/P EST MOD 30 MIN: CPT | Performed by: NURSE PRACTITIONER

## 2024-07-19 PROCEDURE — 1125F AMNT PAIN NOTED PAIN PRSNT: CPT | Performed by: NURSE PRACTITIONER

## 2024-07-20 NOTE — PROGRESS NOTES
Downtime note: please see scanned media for progress note    Labs were ordered previously on 5/24/24

## 2024-08-07 ENCOUNTER — TELEPHONE (OUTPATIENT)
Dept: INTERNAL MEDICINE | Facility: CLINIC | Age: 89
End: 2024-08-07
Payer: MEDICARE

## 2024-08-07 NOTE — TELEPHONE ENCOUNTER
Bob from home health called into office to  let us know pt believes she is at a good spot with her arm strength that she does not need ot any longer so she is being discharged.

## 2024-08-12 ENCOUNTER — OFFICE VISIT (OUTPATIENT)
Dept: INTERNAL MEDICINE | Facility: CLINIC | Age: 89
End: 2024-08-12
Payer: MEDICARE

## 2024-08-12 VITALS
HEIGHT: 60 IN | RESPIRATION RATE: 18 BRPM | TEMPERATURE: 98.7 F | BODY MASS INDEX: 13.27 KG/M2 | HEART RATE: 67 BPM | OXYGEN SATURATION: 95 % | DIASTOLIC BLOOD PRESSURE: 68 MMHG | SYSTOLIC BLOOD PRESSURE: 144 MMHG | WEIGHT: 67.6 LBS

## 2024-08-12 DIAGNOSIS — I10 PRIMARY HYPERTENSION: ICD-10-CM

## 2024-08-12 DIAGNOSIS — M41.20 OTHER IDIOPATHIC SCOLIOSIS, UNSPECIFIED SPINAL REGION: Primary | ICD-10-CM

## 2024-08-12 DIAGNOSIS — R60.0 BILATERAL LOWER EXTREMITY EDEMA: ICD-10-CM

## 2024-08-12 DIAGNOSIS — R26.89 BALANCE DISORDER: ICD-10-CM

## 2024-08-12 PROCEDURE — G2211 COMPLEX E/M VISIT ADD ON: HCPCS | Performed by: INTERNAL MEDICINE

## 2024-08-12 PROCEDURE — 99214 OFFICE O/P EST MOD 30 MIN: CPT | Performed by: INTERNAL MEDICINE

## 2024-08-12 PROCEDURE — 1125F AMNT PAIN NOTED PAIN PRSNT: CPT | Performed by: INTERNAL MEDICINE

## 2024-08-12 RX ORDER — LISINOPRIL AND HYDROCHLOROTHIAZIDE 20; 12.5 MG/1; MG/1
1 TABLET ORAL DAILY
Qty: 90 TABLET | Refills: 1 | Status: SHIPPED | OUTPATIENT
Start: 2024-08-12

## 2024-08-12 RX ORDER — DOCUSATE SODIUM 50 MG/5ML
100 LIQUID ORAL DAILY
Qty: 237 ML | Refills: 1 | Status: SHIPPED | OUTPATIENT
Start: 2024-08-12

## 2024-08-12 NOTE — PROGRESS NOTES
"Chief Complaint  Hypertension (Follow up ), Fatigue, and Edema (Bilateral feet )      Subjective      History of Present Illness  The patient presents for evaluation of foot swelling.    She reports difficulty in wearing her shoes in the morning and removing them at night. She describes a throbbing sensation that radiates up her legs. This issue has been ongoing since her last visit. She is uncertain about her lisinopril medication, but she does take multivitamins. She is unsure if the echocardiogram that was previously ordered has been scheduled. She occasionally experiences difficulty sleeping due to leg swelling and fatigue throughout the day. She dislikes drinking water, so she only consumes a small amount of water throughout the day. Her daughter provided her with RegenaStem, which she found beneficial. She has lost a significant amount of weight, which she attributes to her recent move to an assisted living facility as she does not like the food that they have. She enjoys peanut butter daily, which does not exacerbate her reflux. She does not eat ice cream, but enjoys it. She lost significant weight in 07/2023 due to financial constraints. She is considering a wheelchair for mobility.         Objective   Vital Signs:   Vitals:    08/12/24 1145   BP: 144/68   BP Location: Right arm   Patient Position: Sitting   Cuff Size: Adult   Pulse: 67   Resp: 18   Temp: 98.7 °F (37.1 °C)   TempSrc: Temporal   SpO2: 95%   Weight: 30.7 kg (67 lb 9.6 oz)   Height: 152.4 cm (60\")     Body mass index is 13.2 kg/m².    Wt Readings from Last 3 Encounters:   08/12/24 30.7 kg (67 lb 9.6 oz)   07/19/24 32.7 kg (72 lb)   05/24/24 34.9 kg (77 lb)     BP Readings from Last 3 Encounters:   08/12/24 144/68   07/19/24 148/84   05/24/24 146/90       Health Maintenance   Topic Date Due    DXA SCAN  Never done    ZOSTER VACCINE (1 of 2) Never done    BMI FOLLOWUP  10/27/2022    INFLUENZA VACCINE  08/01/2024    COVID-19 Vaccine (4 - " 2023-24 season) 09/01/2024 (Originally 9/1/2023)    Pneumococcal Vaccine 65+ (1 of 1 - PCV) 02/23/2025 (Originally 10/8/2000)    TDAP/TD VACCINES (2 - Tdap) 02/23/2025 (Originally 7/24/2015)    ANNUAL WELLNESS VISIT  05/24/2025    RSV Vaccine - Adults  Completed       Physical Exam  Vitals reviewed.   Constitutional:       Appearance: Normal appearance. She is well-developed.   HENT:      Head: Normocephalic and atraumatic.      Right Ear: External ear normal.      Left Ear: External ear normal.   Eyes:      Conjunctiva/sclera: Conjunctivae normal.      Pupils: Pupils are equal, round, and reactive to light.   Cardiovascular:      Rate and Rhythm: Normal rate and regular rhythm.      Heart sounds: No murmur heard.     No friction rub. No gallop.      Comments: Lower extremity swelling bilaterally mostly in the feet 2+ in nature  Pulmonary:      Effort: Pulmonary effort is normal.      Breath sounds: Normal breath sounds. No wheezing or rhonchi.   Musculoskeletal:      Comments: Using walker for ambulation but it is quite difficult especially with her kyphosis and weight loss she has significant kyphosis   Skin:     General: Skin is warm and dry.   Neurological:      Mental Status: She is alert and oriented to person, place, and time.   Psychiatric:         Mood and Affect: Affect normal.         Behavior: Behavior normal.         Thought Content: Thought content normal.        Physical Exam  Heart sounds are normal.      Result Review :  The following data was reviewed by: Yoselyn Godfrey MD on 08/12/2024:         Results              Procedures     Power Wheelchair: Patient is unable to safely use a cane, walker or manual wheelchair. Due to immobility related to diagnosis, a power wheelchair is needed to assist with daily living activities inside the home. Patient has the physical ability and mental capabilities to control the power wheelchair inside the home.        Assessment & Plan  Bilateral lower extremity  edema    Primary hypertension    Other idiopathic scoliosis, unspecified spinal region    Balance disorder      Orders Placed This Encounter   Procedures    Miscellaneous DME     New Medications Ordered This Visit   Medications    Docusate Sodium 150 MG/15ML syrup     Sig: Take 10 mL by mouth Daily.     Dispense:  237 mL     Refill:  1    lisinopril-hydrochlorothiazide (Zestoretic) 20-12.5 MG per tablet     Sig: Take 1 tablet by mouth Daily.     Dispense:  90 tablet     Refill:  1          Assessment & Plan  1. Foot swelling.  Her thyroid was last checked in 03/2024. The swelling could be attributed to a thyroid condition, but rather a cardiac issue. Blood work will be conducted today to rule out thyroid and liver function issues. A chest x-ray will be performed today. She will verify if she has her lisinopril/hydrochlorothiazide medication at home. If available, she will start taking it daily at home. The prescription will be resent to her pharmacy.  An ultrasound of her heart will be ordered.    3. Constipation.  Her constipation medication will be refilled.    4. Weight loss.  She has lost 10 pounds since 05/2024. Her weight loss may be related to her nutrition and weight. She was advised to consume high-calorie foods.    Follow-up  She will follow up on next Monday at 12:15.    Patient or patient representative verbalized consent for the use of Ambient Listening during the visit with  Yoselyn Godfrey MD for chart documentation. 8/12/2024  12:22 EDT      FOLLOW UP  No follow-ups on file.  Patient was given instructions and counseling regarding her condition or for health maintenance advice. Please see specific information pulled into the AVS if appropriate.     Yoselyn Godfrey MD  08/12/24  12:24 EDT    CURRENT & DISCONTINUED MEDICATIONS  Current Outpatient Medications   Medication Instructions    aspirin 81 mg, Oral, 3 Times Weekly    Calcium Carb-Cholecalciferol (Caltrate 600+D3 Soft) 600-800 MG-UNIT  chewable tablet No dose, route, or frequency recorded.    Coenzyme Q10 (Co Q-10) 100 MG capsule No dose, route, or frequency recorded.    Docusate Sodium 100 mg, Oral, Daily    famotidine (PEPCID) 40 mg, Oral, Daily    fish oil 1,000 mg, Oral, Daily With Breakfast    lansoprazole (PREVACID SOLUTAB) 15 mg, Oral, Daily    lisinopril-hydrochlorothiazide (Zestoretic) 20-12.5 MG per tablet 1 tablet, Oral, Daily    Multiple Vitamins-Minerals (MULTIVITAMIN ADULT EXTRA C PO) multivitamin Oral Tablet take 1 tablet by oral route once daily with food   Active    polyethylene glycol (GLYCOLAX) 17 g, Oral, Daily    sennosides-docusate (senna-docusate sodium) 8.6-50 MG per tablet 1 tablet, Oral, Daily    vitamin D3 5,000 Units, Oral, Daily       Medications Discontinued During This Encounter   Medication Reason    Docusate Sodium 150 MG/15ML syrup Reorder    lisinopril-hydrochlorothiazide (Zestoretic) 20-12.5 MG per tablet Reorder

## 2024-08-13 DIAGNOSIS — J90 PLEURAL EFFUSION: ICD-10-CM

## 2024-08-13 DIAGNOSIS — R60.0 BILATERAL LOWER EXTREMITY EDEMA: Primary | ICD-10-CM

## 2024-08-14 ENCOUNTER — LAB (OUTPATIENT)
Dept: LAB | Facility: HOSPITAL | Age: 89
End: 2024-08-14
Payer: MEDICARE

## 2024-08-14 DIAGNOSIS — R06.09 DYSPNEA ON EXERTION: ICD-10-CM

## 2024-08-14 DIAGNOSIS — I10 PRIMARY HYPERTENSION: ICD-10-CM

## 2024-08-14 DIAGNOSIS — R60.0 BILATERAL LOWER EXTREMITY EDEMA: ICD-10-CM

## 2024-08-14 LAB
ALBUMIN SERPL-MCNC: 3.9 G/DL (ref 3.5–5.2)
ALBUMIN/GLOB SERPL: 1.6 G/DL
ALP SERPL-CCNC: 65 U/L (ref 39–117)
ALT SERPL W P-5'-P-CCNC: 19 U/L (ref 1–33)
ANION GAP SERPL CALCULATED.3IONS-SCNC: 8.9 MMOL/L (ref 5–15)
AST SERPL-CCNC: 29 U/L (ref 1–32)
BASOPHILS # BLD AUTO: 0.04 10*3/MM3 (ref 0–0.2)
BASOPHILS NFR BLD AUTO: 0.9 % (ref 0–1.5)
BILIRUB SERPL-MCNC: 0.9 MG/DL (ref 0–1.2)
BUN SERPL-MCNC: 16 MG/DL (ref 8–23)
BUN/CREAT SERPL: 30.8 (ref 7–25)
CALCIUM SPEC-SCNC: 10.1 MG/DL (ref 8.6–10.5)
CHLORIDE SERPL-SCNC: 99 MMOL/L (ref 98–107)
CHOLEST SERPL-MCNC: 277 MG/DL (ref 0–200)
CO2 SERPL-SCNC: 36.1 MMOL/L (ref 22–29)
CREAT SERPL-MCNC: 0.52 MG/DL (ref 0.57–1)
DEPRECATED RDW RBC AUTO: 43.7 FL (ref 37–54)
EGFRCR SERPLBLD CKD-EPI 2021: 89.5 ML/MIN/1.73
EOSINOPHIL # BLD AUTO: 0.1 10*3/MM3 (ref 0–0.4)
EOSINOPHIL NFR BLD AUTO: 2.2 % (ref 0.3–6.2)
ERYTHROCYTE [DISTWIDTH] IN BLOOD BY AUTOMATED COUNT: 13.1 % (ref 12.3–15.4)
GLOBULIN UR ELPH-MCNC: 2.5 GM/DL
GLUCOSE SERPL-MCNC: 91 MG/DL (ref 65–99)
HCT VFR BLD AUTO: 38 % (ref 34–46.6)
HDLC SERPL-MCNC: 98 MG/DL (ref 40–60)
HGB BLD-MCNC: 12.9 G/DL (ref 12–15.9)
IMM GRANULOCYTES # BLD AUTO: 0.01 10*3/MM3 (ref 0–0.05)
IMM GRANULOCYTES NFR BLD AUTO: 0.2 % (ref 0–0.5)
LDLC SERPL CALC-MCNC: 169 MG/DL (ref 0–100)
LDLC/HDLC SERPL: 1.7 {RATIO}
LYMPHOCYTES # BLD AUTO: 1.55 10*3/MM3 (ref 0.7–3.1)
LYMPHOCYTES NFR BLD AUTO: 33.5 % (ref 19.6–45.3)
MAGNESIUM SERPL-MCNC: 1.9 MG/DL (ref 1.6–2.4)
MCH RBC QN AUTO: 31.1 PG (ref 26.6–33)
MCHC RBC AUTO-ENTMCNC: 33.9 G/DL (ref 31.5–35.7)
MCV RBC AUTO: 91.6 FL (ref 79–97)
MONOCYTES # BLD AUTO: 0.34 10*3/MM3 (ref 0.1–0.9)
MONOCYTES NFR BLD AUTO: 7.3 % (ref 5–12)
NEUTROPHILS NFR BLD AUTO: 2.59 10*3/MM3 (ref 1.7–7)
NEUTROPHILS NFR BLD AUTO: 55.9 % (ref 42.7–76)
NRBC BLD AUTO-RTO: 0 /100 WBC (ref 0–0.2)
NT-PROBNP SERPL-MCNC: 531 PG/ML (ref 0–1800)
PLATELET # BLD AUTO: 215 10*3/MM3 (ref 140–450)
PMV BLD AUTO: 9.6 FL (ref 6–12)
POTASSIUM SERPL-SCNC: 3.3 MMOL/L (ref 3.5–5.2)
PROT SERPL-MCNC: 6.4 G/DL (ref 6–8.5)
RBC # BLD AUTO: 4.15 10*6/MM3 (ref 3.77–5.28)
SODIUM SERPL-SCNC: 144 MMOL/L (ref 136–145)
TRIGL SERPL-MCNC: 63 MG/DL (ref 0–150)
TSH SERPL DL<=0.05 MIU/L-ACNC: 0.99 UIU/ML (ref 0.27–4.2)
VLDLC SERPL-MCNC: 10 MG/DL (ref 5–40)
WBC NRBC COR # BLD AUTO: 4.63 10*3/MM3 (ref 3.4–10.8)

## 2024-08-14 PROCEDURE — 85025 COMPLETE CBC W/AUTO DIFF WBC: CPT | Performed by: INTERNAL MEDICINE

## 2024-08-14 PROCEDURE — 80061 LIPID PANEL: CPT | Performed by: INTERNAL MEDICINE

## 2024-08-14 PROCEDURE — 83880 ASSAY OF NATRIURETIC PEPTIDE: CPT

## 2024-08-14 PROCEDURE — 83735 ASSAY OF MAGNESIUM: CPT | Performed by: INTERNAL MEDICINE

## 2024-08-14 PROCEDURE — 84443 ASSAY THYROID STIM HORMONE: CPT | Performed by: INTERNAL MEDICINE

## 2024-08-14 PROCEDURE — 80053 COMPREHEN METABOLIC PANEL: CPT | Performed by: INTERNAL MEDICINE

## 2024-08-26 ENCOUNTER — OFFICE VISIT (OUTPATIENT)
Dept: CARDIOLOGY | Facility: CLINIC | Age: 89
End: 2024-08-26
Payer: MEDICARE

## 2024-08-26 VITALS
SYSTOLIC BLOOD PRESSURE: 205 MMHG | DIASTOLIC BLOOD PRESSURE: 75 MMHG | HEART RATE: 78 BPM | HEIGHT: 60 IN | WEIGHT: 54 LBS | BODY MASS INDEX: 10.6 KG/M2

## 2024-08-26 DIAGNOSIS — R60.0 EDEMA LEG: Primary | ICD-10-CM

## 2024-08-26 DIAGNOSIS — I10 HYPERTENSION, ESSENTIAL: ICD-10-CM

## 2024-08-26 PROCEDURE — 99204 OFFICE O/P NEW MOD 45 MIN: CPT | Performed by: INTERNAL MEDICINE

## 2024-08-26 PROCEDURE — 1159F MED LIST DOCD IN RCRD: CPT | Performed by: INTERNAL MEDICINE

## 2024-08-26 PROCEDURE — 1160F RVW MEDS BY RX/DR IN RCRD: CPT | Performed by: INTERNAL MEDICINE

## 2024-08-26 RX ORDER — LISINOPRIL AND HYDROCHLOROTHIAZIDE 12.5; 2 MG/1; MG/1
2 TABLET ORAL DAILY
Qty: 180 TABLET | Refills: 1 | Status: SHIPPED | OUTPATIENT
Start: 2024-08-26

## 2024-08-26 RX ORDER — POTASSIUM CHLORIDE 750 MG/1
10 TABLET, EXTENDED RELEASE ORAL 2 TIMES DAILY
Qty: 180 TABLET | Refills: 3 | Status: SHIPPED | OUTPATIENT
Start: 2024-08-26

## 2024-08-26 NOTE — PROGRESS NOTES
Chief Complaint  Bilateral lower extremity edema and Pleural Effusion    Subjective            Emi Ball presents to [facility] CARDIOLOGY  History of Present Illness    88-year-old white female.  She has no significant cardiac history.  She is referred due to lower extremity edema and pleural effusion.  She has hypertension.  She reports she is compliant with her medical therapy.  She has been sleeping in a recliner for the past couple of months.  It is unclear why.  She has mild shortness of breath and reports fatigue.    PMH  Past Medical History:   Diagnosis Date    Gastroesophageal reflux     GERD (gastroesophageal reflux disease)     Myocardial infarction     Otalgia     Pain head     Scoliosis 05/04/2020    Vitamin D deficiency          SURGICALHX  History reviewed. No pertinent surgical history.     SOC  Social History     Socioeconomic History    Marital status:    Tobacco Use    Smoking status: Never    Smokeless tobacco: Never   Vaping Use    Vaping status: Never Used   Substance and Sexual Activity    Alcohol use: Not Currently     Comment: social    Drug use: Never    Sexual activity: Defer         FAMHX  Family History   Problem Relation Age of Onset    Heart attack Other           ALLERGY  Allergies   Allergen Reactions    Iodinated Contrast Media Unknown - High Severity    Iodine Unknown - High Severity    [place] Unknown - High Severity        MEDSCURRENT    Current Outpatient Medications:     aspirin 81 MG EC tablet, Take 1 tablet by mouth 3 (Three) Times a Week., Disp: , Rfl:     Calcium Carb-Cholecalciferol (Caltrate 600+D3 Soft) 600-800 MG-UNIT chewable tablet, , Disp: , Rfl:     Coenzyme Q10 (Co Q-10) 100 MG capsule, , Disp: , Rfl:     Docusate Sodium 150 MG/15ML syrup, Take 10 mL by mouth Daily., Disp: 237 mL, Rfl: 1    famotidine (Pepcid) 40 MG tablet, Take 1 tablet by mouth Daily., Disp: 90 tablet, Rfl: 1    lansoprazole (PREVACID SOLUTAB) 15 MG Tablet  "Delayed Release Dispersible disintegrating tablet, Take 1 tablet by mouth Daily., Disp: 90 tablet, Rfl: 2    lisinopril-hydrochlorothiazide (Zestoretic) 20-12.5 MG per tablet, Take 2 tablets by mouth Daily., Disp: 180 tablet, Rfl: 1    Multiple Vitamins-Minerals (MULTIVITAMIN ADULT EXTRA C PO), multivitamin Oral Tablet take 1 tablet by oral route once daily with food   Active, Disp: , Rfl:     Omega-3 Fatty Acids (fish oil) 1000 MG capsule capsule, Take 1 capsule by mouth Daily With Breakfast., Disp: , Rfl:     polyethylene glycol (GlycoLax) 17 GM/SCOOP powder, Take 17 g by mouth Daily., Disp: 289 g, Rfl: 2    sennosides-docusate (senna-docusate sodium) 8.6-50 MG per tablet, Take 1 tablet by mouth Daily., Disp: 90 tablet, Rfl: 1    vitamin D3 125 MCG (5000 UT) capsule capsule, Take 1 capsule by mouth Daily., Disp: , Rfl:     potassium chloride 10 MEQ CR tablet, Take 1 tablet by mouth 2 (Two) Times a Day., Disp: 180 tablet, Rfl: 3      Review of Systems   Constitutional: Positive for malaise/fatigue.   HENT: Negative.     Eyes: Negative.    Cardiovascular:  Positive for leg swelling. Negative for chest pain.   Respiratory:  Positive for shortness of breath.    Endocrine: Negative.    Hematologic/Lymphatic: Negative.    Skin: Negative.    Musculoskeletal:  Positive for arthritis and back pain.   Gastrointestinal: Negative.    Genitourinary: Negative.    Neurological: Negative.    Psychiatric/Behavioral: Negative.          Objective     BP (!) 205/75   Pulse 78   Ht 152.4 cm (60\")   Wt 24.5 kg (54 lb)   BMI 10.55 kg/m²       General Appearance:   well developed  Frail-appearing  HENT:   oropharynx moist  lips not cyanotic  Neck:  thyroid not enlarged  supple  Respiratory:  no respiratory distress  normal breath sounds  no rales  Cardiovascular:  no jugular venous distention  regular rhythm  apical impulse normal  S1 normal, S2 normal  no S3, no S4   no murmur  no rub, no thrill  carotid pulses normal; no " bruit  pedal pulses normal  lower extremity edema: 1+ ankle/pedal edema  Musculoskeletal:  no clubbing of fingers.   normocephalic, head atraumatic  Skin:   warm, dry  Psychiatric:  judgement and insight appropriate  normal mood and affect      Result Review :     The following data was reviewed by: Abhi Ortega MD on 08/26/2024:    CMP          3/8/2024    10:16 8/14/2024    11:33   CMP   Glucose 96  91    BUN 19  16    Creatinine 0.51  0.52    EGFR 89.9  89.5    Sodium 144  144    Potassium 3.8  3.3    Chloride 103  99    Calcium 9.5  10.1    Total Protein 6.4  6.4    Albumin 4.3  3.9    Globulin 2.1  2.5    Total Bilirubin 1.2  0.9    Alkaline Phosphatase 50  65    AST (SGOT) 26  29    ALT (SGPT) 16  19    Albumin/Globulin Ratio 2.0  1.6    BUN/Creatinine Ratio 37.3  30.8    Anion Gap 8.0  8.9      CBC          3/8/2024    10:16 8/14/2024    11:33   CBC   WBC 4.60  4.63    RBC 4.39  4.15    Hemoglobin 13.8  12.9    Hematocrit 40.5  38.0    MCV 92.3  91.6    MCH 31.4  31.1    MCHC 34.1  33.9    RDW 12.8  13.1    Platelets 203  215      Lipid Panel          3/8/2024    10:16 8/14/2024    11:33   Lipid Panel   Total Cholesterol 290  277    Triglycerides 72  63    HDL Cholesterol 106  98    VLDL Cholesterol 11  10    LDL Cholesterol  173  169    LDL/HDL Ratio 1.60  1.70      TSH          3/8/2024    10:16 8/14/2024    11:33   TSH   TSH 1.110  0.994        Data reviewed : Primary care records reviewed      Procedures                Assessment and Plan        ASSESSMENT:  Encounter Diagnoses   Name Primary?    Edema leg Yes    Hypertension, essential          PLAN:    1.  Essential hypertension-markedly uncontrolled today.  I am doubling the lisinopril/HCTZ.  Her recent chemistry showed low potassium so I am starting a potassium supplement and we will repeat the basic metabolic panel in 1 week.  Blood pressure check in 2 weeks.  2.  Leg edema-likely due to multiple factors, venous insufficiency, and  sleeping in a recliner are the primary likely causes.  Her recent BNP was normal.  She could have some congestive failure from hypertension, and echo was supposed to be scheduled for today although the patient seems to be unaware of that appointment.  We will try to get that done.  Otherwise blood pressure control and sleeping in the bed with legs elevated are likely to improve the edema.  3.  Blood pressure check in 2 weeks          Patient was given instructions and counseling regarding her condition or for health maintenance advice. Please see specific information pulled into the AVS if appropriate.             JING Ortega MD  8/26/2024    10:57 EDT

## 2024-08-30 ENCOUNTER — HOSPITAL ENCOUNTER (EMERGENCY)
Facility: HOSPITAL | Age: 89
Discharge: HOME | End: 2024-08-30
Attending: EMERGENCY MEDICINE
Payer: MEDICARE

## 2024-08-30 VITALS
OXYGEN SATURATION: 93 % | TEMPERATURE: 98.2 F | DIASTOLIC BLOOD PRESSURE: 81 MMHG | BODY MASS INDEX: 13.43 KG/M2 | WEIGHT: 72.97 LBS | RESPIRATION RATE: 18 BRPM | SYSTOLIC BLOOD PRESSURE: 170 MMHG | HEIGHT: 62 IN | HEART RATE: 82 BPM

## 2024-08-30 DIAGNOSIS — R60.0 PEDAL EDEMA: ICD-10-CM

## 2024-08-30 DIAGNOSIS — I10 HYPERTENSION, UNSPECIFIED TYPE: Primary | ICD-10-CM

## 2024-08-30 PROCEDURE — 99283 EMERGENCY DEPT VISIT LOW MDM: CPT

## 2024-08-30 RX ORDER — SODIUM CHLORIDE 0.9 % (FLUSH) 0.9 %
10 SYRINGE (ML) INJECTION AS NEEDED
Status: DISCONTINUED | OUTPATIENT
Start: 2024-08-30 | End: 2024-08-31 | Stop reason: HOSPADM

## 2024-08-31 NOTE — DISCHARGE INSTRUCTIONS
Please take your blood pressure medication as prescribed by your doctor.  Please elevate your feet is much as possible.

## 2024-08-31 NOTE — ED PROVIDER NOTES
Time: 9:12 PM EDT  Date of encounter:  8/30/2024  Independent Historian/Clinical History and Information was obtained by:   Patient    History is limited by: N/A    Chief Complaint: Elevated blood pressure      History of Present Illness:  Patient is a 88 y.o. year old female who presents to the emergency department for evaluation of elevated blood pressure    At the time of my evaluation the patient has no complaints.  She states that she slept late and went to the  at her assisted living where they ultimately checked her blood pressure.  They found her blood pressure to be high and recommended rechecking it this evening.  She went back and had it rechecked this evening and it continue to be high which prompted concern with the assisted living staff who urged her to visit the emergency department.  The patient states that she had no symptoms and had no concerns and did not want to come to the emergency department but ultimately she is ended up here.  She is ready to leave at the time of my evaluation and is already called for her own ride.      Patient Care Team  Primary Care Provider: Yoselyn Godfrey MD    Past Medical History:     Allergies   Allergen Reactions    Iodinated Contrast Media Unknown - High Severity    Iodine Unknown - High Severity    [place] Unknown - High Severity     Past Medical History:   Diagnosis Date    Gastroesophageal reflux     GERD (gastroesophageal reflux disease)     Myocardial infarction     Otalgia     Pain head     Scoliosis 05/04/2020    Vitamin D deficiency      History reviewed. No pertinent surgical history.  Family History   Problem Relation Age of Onset    Heart attack Other        Home Medications:  Prior to Admission medications    Medication Sig Start Date End Date Taking? Authorizing Provider   aspirin 81 MG EC tablet Take 1 tablet by mouth 3 (Three) Times a Week.    Provider, MD Ino   Calcium Carb-Cholecalciferol (Caltrate 600+D3 Soft) 600-800  MG-UNIT chewable tablet     Ino Jimenez MD   Coenzyme Q10 (Co Q-10) 100 MG capsule     Ino Jimenez MD   Docusate Sodium 150 MG/15ML syrup Take 10 mL by mouth Daily. 8/12/24   Yoselyn Godfrey MD   famotidine (Pepcid) 40 MG tablet Take 1 tablet by mouth Daily. 2/23/24   Yoselyn Godfrey MD   lansoprazole (PREVACID SOLUTAB) 15 MG Tablet Delayed Release Dispersible disintegrating tablet Take 1 tablet by mouth Daily. 2/27/24   Yoselyn Godfrey MD   lisinopril-hydrochlorothiazide (Zestoretic) 20-12.5 MG per tablet Take 2 tablets by mouth Daily. 8/26/24   JING Ortega MD   Multiple Vitamins-Minerals (MULTIVITAMIN ADULT EXTRA C PO) multivitamin Oral Tablet take 1 tablet by oral route once daily with food   Active    Ino Jimenez MD   Omega-3 Fatty Acids (fish oil) 1000 MG capsule capsule Take 1 capsule by mouth Daily With Breakfast.    Ino Jimenez MD   polyethylene glycol (GlycoLax) 17 GM/SCOOP powder Take 17 g by mouth Daily. 7/4/24   Agustín Villa MD   potassium chloride 10 MEQ CR tablet Take 1 tablet by mouth 2 (Two) Times a Day. 8/26/24   JING Ortega MD   sennosides-docusate (senna-docusate sodium) 8.6-50 MG per tablet Take 1 tablet by mouth Daily. 3/28/24   Yoselyn Godfrey MD   vitamin D3 125 MCG (5000 UT) capsule capsule Take 1 capsule by mouth Daily.    Ino Jimenez MD        Social History:   Social History     Tobacco Use    Smoking status: Never    Smokeless tobacco: Never   Vaping Use    Vaping status: Never Used   Substance Use Topics    Alcohol use: Not Currently     Comment: social    Drug use: Never         Review of Systems:  Review of Systems   Constitutional:  Negative for chills and fever.   HENT:  Negative for congestion, ear pain and sore throat.    Eyes:  Negative for pain.   Respiratory:  Negative for cough, chest tightness and shortness of breath.    Cardiovascular:  Negative for chest pain.   Gastrointestinal:  Negative for  "abdominal pain, diarrhea, nausea and vomiting.   Genitourinary:  Negative for flank pain and hematuria.   Musculoskeletal:  Negative for joint swelling.   Skin:  Negative for pallor.   Neurological:  Negative for seizures and headaches.   All other systems reviewed and are negative.       Physical Exam:  /81 (BP Location: Right arm, Patient Position: Sitting)   Pulse 82   Temp 98.2 °F (36.8 °C) (Oral)   Resp 18   Ht 157.5 cm (62\")   Wt 33.1 kg (72 lb 15.6 oz)   SpO2 93%   BMI 13.35 kg/m²     Physical Exam  Vitals and nursing note reviewed.   Constitutional:       General: She is not in acute distress.     Appearance: Normal appearance. She is not toxic-appearing.   HENT:      Head: Normocephalic and atraumatic.      Jaw: There is normal jaw occlusion.   Eyes:      General: Lids are normal.      Extraocular Movements: Extraocular movements intact.      Conjunctiva/sclera: Conjunctivae normal.      Pupils: Pupils are equal, round, and reactive to light.   Cardiovascular:      Rate and Rhythm: Normal rate and regular rhythm.      Pulses: Normal pulses.      Heart sounds: Normal heart sounds.   Pulmonary:      Effort: Pulmonary effort is normal. No respiratory distress.      Breath sounds: Normal breath sounds. No wheezing or rhonchi.   Abdominal:      General: Abdomen is flat.      Palpations: Abdomen is soft.      Tenderness: There is no abdominal tenderness. There is no guarding or rebound.   Musculoskeletal:         General: Normal range of motion.      Cervical back: Normal range of motion and neck supple.      Right lower leg: Edema present.      Left lower leg: Edema present.      Comments: Mild bilateral lower extremity pitting pedal edema   Skin:     General: Skin is warm and dry.   Neurological:      Mental Status: She is alert and oriented to person, place, and time. Mental status is at baseline.      Comments: NIHSS = 0   Psychiatric:         Mood and Affect: Mood normal.         "       Procedures:  Procedures      Medical Decision Making:      Comorbidities that affect care:    GERD, coronary disease, scoliosis    External Notes reviewed:    Previous Clinic Note: Outpatient cardiology visit with Dr. Ortega for peripheral edema 8/26/2024      The following orders were placed and all results were independently analyzed by me:  No orders of the defined types were placed in this encounter.      Medications Given in the Emergency Department:  Medications - No data to display       ED Course:         Labs:    Lab Results (last 24 hours)       ** No results found for the last 24 hours. **             Imaging:    No Radiology Exams Resulted Within Past 24 Hours      Differential Diagnosis and Discussion:    Stroke: Differential diagnosis in this patient with signs of possible ischemic stroke include TIA or ischemic stroke, hemorrhagic stroke, hypoglycemic episode, toxic or metabolic encephalopathy, paresthesias.        MDM  Number of Diagnoses or Management Options  Hypertension, unspecified type  Pedal edema  Diagnosis management comments: Patient is awake alert oriented and appears to be competent to make her own medical decisions.  She has no concerns or wishes to leave.  She agrees to return to the emergency department any worsening symptoms or concerns.  Urged her to take her blood pressure medication as prescribed and to elevate her feet for the mild swelling that she has.                 Patient Care Considerations:    CT HEAD: I considered ordering a noncontrast CT of the head, however patient has normal nonfocal neurologic exam and does not wish any further workup.      Consultants/Shared Management Plan:    None    Social Determinants of Health:    Patient is independent, reliable, and has access to care.       Disposition and Care Coordination:    Discharged: The patient is suitable and stable for discharge with no need for consideration of admission.    I have explained the patient´s  condition, diagnoses and treatment plan based on the information available to me at this time. I have answered questions and addressed any concerns. The patient has a good  understanding of the patient´s diagnosis, condition, and treatment plan as can be expected at this point. The vital signs have been stable. The patient´s condition is stable and appropriate for discharge from the emergency department.      The patient will pursue further outpatient evaluation with the primary care physician or other designated or consulting physician as outlined in the discharge instructions. They are agreeable to this plan of care and follow-up instructions have been explained in detail. The patient has received these instructions in written format and has expressed an understanding of the discharge instructions. The patient is aware that any significant change in condition or worsening of symptoms should prompt an immediate return to this or the closest emergency department or call to 911.    Final diagnoses:   Hypertension, unspecified type   Pedal edema        ED Disposition       ED Disposition   Discharge    Condition   Stable    Comment   --               This medical record created using voice recognition software.             Edis Angeles MD  08/31/24 0651

## 2024-09-06 RX ORDER — POTASSIUM CHLORIDE 750 MG/1
10 TABLET, EXTENDED RELEASE ORAL 2 TIMES DAILY
Qty: 180 TABLET | Refills: 3 | Status: SHIPPED | OUTPATIENT
Start: 2024-09-06 | End: 2024-09-09

## 2024-09-06 NOTE — TELEPHONE ENCOUNTER
Caller: Emi Ball    Relationship: Self    Best call back number: NO PHONE    Requested Prescriptions:   Requested Prescriptions     Pending Prescriptions Disp Refills    potassium chloride 10 MEQ CR tablet 180 tablet 3     Sig: Take 1 tablet by mouth 2 (Two) Times a Day.        Pharmacy where request should be sent: [facility] [id number] [place] - [address] AT [facility] [address]  [phone] [facility] [phone] FX     Last office visit with prescribing clinician: 8/12/2024   Last telemedicine visit with prescribing clinician: Visit date not found   Next office visit with prescribing clinician: 9/9/2024     Additional details provided by patient: PATIENT NEEDS CHEWABLE PILLS OR POWDER    PATIENT HAS TRIED CALLING HER CARDIOLOGIST ALL WEEK AND CANNOT GET THEM TO ANSWER PHONE.    CALLER STATES SHE NEEDS THIS MEDICATION TODAY    Does the patient have less than a 3 day supply:  [x] Yes  [] No    Would you like a call back once the refill request has been completed: [] Yes [x] No    If the office needs to give you a call back, can they leave a voicemail: [] Yes [] No    Kal Conte Rep   09/06/24 11:29 EDT

## 2024-09-06 NOTE — TELEPHONE ENCOUNTER
Normal serum potassium on file in past 12 months   PATIENT NEEDS CHEWABLE PILLS OR POWDER     PATIENT HAS TRIED CALLING HER CARDIOLOGIST ALL WEEK AND CANNOT GET THEM TO ANSWER PHONE.     CALLER STATES SHE NEEDS THIS MEDICATION TODAY

## 2024-09-09 ENCOUNTER — OFFICE VISIT (OUTPATIENT)
Dept: INTERNAL MEDICINE | Facility: CLINIC | Age: 89
End: 2024-09-09
Payer: MEDICARE

## 2024-09-09 VITALS
TEMPERATURE: 99.7 F | BODY MASS INDEX: 11.85 KG/M2 | RESPIRATION RATE: 18 BRPM | OXYGEN SATURATION: 96 % | HEART RATE: 78 BPM | DIASTOLIC BLOOD PRESSURE: 82 MMHG | WEIGHT: 64.4 LBS | HEIGHT: 62 IN | SYSTOLIC BLOOD PRESSURE: 156 MMHG

## 2024-09-09 DIAGNOSIS — E87.6 HYPOKALEMIA: ICD-10-CM

## 2024-09-09 DIAGNOSIS — Z91.81 AT HIGH RISK FOR FALLS: ICD-10-CM

## 2024-09-09 DIAGNOSIS — E46 PROTEIN-CALORIE MALNUTRITION, UNSPECIFIED SEVERITY: ICD-10-CM

## 2024-09-09 DIAGNOSIS — M25.552 BILATERAL HIP PAIN: ICD-10-CM

## 2024-09-09 DIAGNOSIS — I10 PRIMARY HYPERTENSION: Primary | ICD-10-CM

## 2024-09-09 DIAGNOSIS — M25.551 BILATERAL HIP PAIN: ICD-10-CM

## 2024-09-09 PROCEDURE — G2211 COMPLEX E/M VISIT ADD ON: HCPCS | Performed by: INTERNAL MEDICINE

## 2024-09-09 PROCEDURE — 1125F AMNT PAIN NOTED PAIN PRSNT: CPT | Performed by: INTERNAL MEDICINE

## 2024-09-09 PROCEDURE — 99214 OFFICE O/P EST MOD 30 MIN: CPT | Performed by: INTERNAL MEDICINE

## 2024-09-09 RX ORDER — HYDROCHLOROTHIAZIDE 12.5 MG/1
12.5 TABLET ORAL DAILY
Qty: 90 TABLET | Refills: 1 | Status: SHIPPED | OUTPATIENT
Start: 2024-09-09

## 2024-09-09 RX ORDER — LISINOPRIL 40 MG/1
40 TABLET ORAL DAILY
Qty: 90 TABLET | Refills: 1 | Status: SHIPPED | OUTPATIENT
Start: 2024-09-09

## 2024-09-09 NOTE — PROGRESS NOTES
"Chief Complaint  Fall (2 days ago ) and Weight Loss (F/u)      Subjective      History of Present Illness  The patient presents for evaluation of multiple medical concerns.    She experienced a fall two days ago, resulting in pain from her knee to her hip. The incident occurred when she lost balance while holding a heavy box, causing her to fall sideways onto her left side. The pain is manageable when she is at rest but intensifies during walking or lying down. Additionally, she mentions experiencing pain in her right hip when taking deep breaths, a symptom she had not noticed prior to the fall.    Last Friday, she felt dizzy and sought assistance. A nurse checked her blood pressure, which was found to be over 200. During her ER visit, her blood pressure was recorded as 170/81. Her medication regimen remained unchanged. She is currently on lisinopril hydrochlorothiazide, one tablet daily. She also reports difficulty swallowing the large potassium pills prescribed by her cardiologist.  She did not increase the blood pressure pill to twice a day as recommended in Dr. Ortega's note.    She has had some weight gain since starting the [email or web address] instant breakfast.         Objective   Vital Signs:   Vitals:    09/09/24 1240   BP: 156/82   BP Location: Right arm   Patient Position: Sitting   Cuff Size: Small Adult   Pulse: 78   Resp: 18   Temp: 99.7 °F (37.6 °C)   TempSrc: Temporal   SpO2: 96%   Weight: 29.2 kg (64 lb 6.4 oz)   Height: 157.5 cm (62.01\")     Body mass index is 11.78 kg/m².    Wt Readings from Last 3 Encounters:   09/09/24 29.2 kg (64 lb 6.4 oz)   08/30/24 33.1 kg (72 lb 15.6 oz)   08/26/24 24.5 kg (54 lb)     BP Readings from Last 3 Encounters:   09/09/24 156/82   08/30/24 170/81   08/26/24 (!) 205/75       Health Maintenance   Topic Date Due    DXA SCAN  Never done    ZOSTER VACCINE (1 of 2) Never done    INFLUENZA VACCINE  08/01/2024    COVID-19 Vaccine (4 - 2023-24 season) 09/10/2024 (Originally 9/1/2024) "    Pneumococcal Vaccine 65+ (1 of 1 - PCV) 02/23/2025 (Originally 10/8/2000)    TDAP/TD VACCINES (2 - Tdap) 02/23/2025 (Originally 7/24/2015)    ANNUAL WELLNESS VISIT  05/24/2025    LIPID PANEL  08/14/2025    BMI FOLLOWUP  08/26/2025    RSV Vaccine - Adults  Completed       Physical Exam  Vitals reviewed.   Constitutional:       Appearance: Normal appearance. She is well-developed.   HENT:      Head: Normocephalic and atraumatic.      Right Ear: External ear normal.      Left Ear: External ear normal.   Eyes:      Conjunctiva/sclera: Conjunctivae normal.      Pupils: Pupils are equal, round, and reactive to light.   Cardiovascular:      Rate and Rhythm: Normal rate and regular rhythm.      Heart sounds: No murmur heard.     No friction rub. No gallop.   Pulmonary:      Effort: Pulmonary effort is normal.      Breath sounds: Normal breath sounds. No wheezing or rhonchi.   Musculoskeletal:      Comments: Significant kyphosis.  Uses walker regularly   Skin:     General: Skin is warm and dry.   Neurological:      Mental Status: She is alert and oriented to person, place, and time.   Psychiatric:         Mood and Affect: Affect normal.         Behavior: Behavior normal.         Thought Content: Thought content normal.        Physical Exam        Result Review :  The following data was reviewed by: Yoselyn Godfrey MD on 09/09/2024:         Results              Procedures            Assessment & Plan  Hypokalemia    Primary hypertension    Protein-calorie malnutrition, unspecified severity    Bilateral hip pain    At high risk for falls      Orders Placed This Encounter   Procedures    Basic metabolic panel     New Medications Ordered This Visit   Medications    lisinopril (PRINIVIL,ZESTRIL) 40 MG tablet     Sig: Take 1 tablet by mouth Daily.     Dispense:  90 tablet     Refill:  1    hydroCHLOROthiazide 12.5 MG tablet     Sig: Take 1 tablet by mouth Daily.     Dispense:  90 tablet     Refill:  1          Assessment &  Plan  1. Left hip pain.  She experienced left hip pain following a fall from standing two days ago, where she landed on her left side. The pain is mild and occurs primarily when walking or lying down. An x-ray of the hip was suggested, but she opted to monitor the condition for now. If the pain persists or worsens in the coming days, an x-ray will be ordered.    2. Hypertension.  Her blood pressure was elevated during her ER visit, with a reading of 170/81. She is currently on lisinopril hydrochlorothiazide 20 mg/12.5 mg once daily. Her leg swelling has improved, likely due to her antihypertensive medication. Blood work, including potassium levels, will be conducted today. If her potassium levels are low, a different potassium supplement will be considered. The dosage of her lisinopril will be increased to 40 mg daily, and the hydrochlorothiazide will remain at 12.5 mg. A prescription for the new dosages will be sent to [facility]. An echocardiogram will be scheduled.    3.  She understands to monitor for falls closely and do anything she can to prevent them from happening.    4. weight loss.    Continue [employer] instant breakfast as it appears to be working well for her.    Follow-up  The patient will follow up in 2 weeks.    Patient or patient representative verbalized consent for the use of Ambient Listening during the visit with  Yoselyn Godfrey MD for chart documentation. 9/9/2024  13:49 EDT      FOLLOW UP  Return in about 2 weeks (around 9/23/2024).  Patient was given instructions and counseling regarding her condition or for health maintenance advice. Please see specific information pulled into the AVS if appropriate.     Yoselyn Godfrey MD  09/09/24  13:48 EDT    CURRENT & DISCONTINUED MEDICATIONS  Current Outpatient Medications   Medication Instructions    aspirin 81 mg, Oral, 3 Times Weekly    Calcium Carb-Cholecalciferol (Caltrate 600+D3 Soft) 600-800 MG-UNIT chewable tablet No dose, route, or  frequency recorded.    Coenzyme Q10 (Co Q-10) 100 MG capsule No dose, route, or frequency recorded.    famotidine (PEPCID) 40 mg, Oral, Daily    fish oil 1,000 mg, Oral, Daily With Breakfast    hydroCHLOROthiazide 12.5 mg, Oral, Daily    lansoprazole (PREVACID SOLUTAB) 15 mg, Oral, Daily    lisinopril (PRINIVIL,ZESTRIL) 40 mg, Oral, Daily    Multiple Vitamins-Minerals (MULTIVITAMIN ADULT EXTRA C PO) multivitamin Oral Tablet take 1 tablet by oral route once daily with food   Active    sennosides-docusate (senna-docusate sodium) 8.6-50 MG per tablet 1 tablet, Oral, Daily    vitamin D3 5,000 Units, Oral, Daily       Medications Discontinued During This Encounter   Medication Reason    Docusate Sodium 150 MG/15ML syrup     polyethylene glycol (GlycoLax) 17 GM/SCOOP powder     potassium chloride 10 MEQ CR tablet     lisinopril-hydrochlorothiazide (Zestoretic) 20-12.5 MG per tablet

## 2024-09-11 ENCOUNTER — TELEPHONE (OUTPATIENT)
Dept: INTERNAL MEDICINE | Facility: CLINIC | Age: 89
End: 2024-09-11
Payer: MEDICARE

## 2024-09-11 DIAGNOSIS — M25.551 BILATERAL HIP PAIN: Primary | ICD-10-CM

## 2024-09-11 DIAGNOSIS — M25.552 BILATERAL HIP PAIN: Primary | ICD-10-CM

## 2024-09-11 NOTE — TELEPHONE ENCOUNTER
Pt called into office today wanting to get an order for a hip x-ray. Stated she now believes she needs one .

## 2024-09-23 ENCOUNTER — OFFICE VISIT (OUTPATIENT)
Dept: INTERNAL MEDICINE | Facility: CLINIC | Age: 89
End: 2024-09-23
Payer: MEDICARE

## 2024-09-23 VITALS
HEIGHT: 62 IN | BODY MASS INDEX: 10.58 KG/M2 | OXYGEN SATURATION: 98 % | SYSTOLIC BLOOD PRESSURE: 154 MMHG | HEART RATE: 77 BPM | DIASTOLIC BLOOD PRESSURE: 66 MMHG | TEMPERATURE: 98.6 F | RESPIRATION RATE: 18 BRPM | WEIGHT: 57.5 LBS

## 2024-09-23 DIAGNOSIS — R60.0 BILATERAL LOWER EXTREMITY EDEMA: ICD-10-CM

## 2024-09-23 DIAGNOSIS — M25.552 PAIN OF LEFT HIP: ICD-10-CM

## 2024-09-23 DIAGNOSIS — H10.13 ALLERGIC CONJUNCTIVITIS OF BOTH EYES: ICD-10-CM

## 2024-09-23 DIAGNOSIS — I10 PRIMARY HYPERTENSION: Primary | ICD-10-CM

## 2024-09-23 DIAGNOSIS — E46 PROTEIN-CALORIE MALNUTRITION, UNSPECIFIED SEVERITY: ICD-10-CM

## 2024-09-23 PROCEDURE — 1159F MED LIST DOCD IN RCRD: CPT | Performed by: INTERNAL MEDICINE

## 2024-09-23 PROCEDURE — G2211 COMPLEX E/M VISIT ADD ON: HCPCS | Performed by: INTERNAL MEDICINE

## 2024-09-23 PROCEDURE — 1160F RVW MEDS BY RX/DR IN RCRD: CPT | Performed by: INTERNAL MEDICINE

## 2024-09-23 PROCEDURE — 1125F AMNT PAIN NOTED PAIN PRSNT: CPT | Performed by: INTERNAL MEDICINE

## 2024-09-23 PROCEDURE — 99214 OFFICE O/P EST MOD 30 MIN: CPT | Performed by: INTERNAL MEDICINE

## 2024-09-23 RX ORDER — OLOPATADINE HYDROCHLORIDE 1 MG/ML
1 SOLUTION/ DROPS OPHTHALMIC 2 TIMES DAILY
Qty: 5 ML | Refills: 1 | Status: SHIPPED | OUTPATIENT
Start: 2024-09-23

## 2024-09-23 NOTE — PROGRESS NOTES
"Chief Complaint  Follow-up (2 week follow up for hypertension ) and Hypertension      Subjective      History of Present Illness  The patient presents for evaluation of multiple medical concerns.    She reports an improvement in her leg condition, attributing it to the use of lisinopril. However, she has discontinued hydrochlorothiazide due to associated dizziness. She does not monitor her blood pressure at home. Despite some residual swelling, she has not been elevating her legs. She is not experiencing any chest pain but mentions persistent fatigue. She continues to experience intermittent leg pain and believes an x-ray may be necessary.    She is struggling with weight loss due to not eating at her current residence because she does not like the food. She has been consuming Ensure drinks as recommended, but they seem to cause her discomfort. She is considering supplementing her diet with protein powder.    She wakes up with dry eyes every morning and is seeking a solution. She reports no itchiness in her eyes.    IMMUNIZATIONS  She received influenza vaccine 1 week ago.         Objective   Vital Signs:   Vitals:    09/23/24 1256   BP: 154/66   BP Location: Left arm   Patient Position: Sitting   Cuff Size: Adult   Pulse: 77   Resp: 18   Temp: 98.6 °F (37 °C)   TempSrc: Temporal   SpO2: 98%   Weight: 26.1 kg (57 lb 8 oz)   Height: 157.5 cm (62.01\")     Body mass index is 10.51 kg/m².    Wt Readings from Last 3 Encounters:   09/23/24 26.1 kg (57 lb 8 oz)   09/09/24 29.2 kg (64 lb 6.4 oz)   08/30/24 33.1 kg (72 lb 15.6 oz)     BP Readings from Last 3 Encounters:   09/23/24 154/66   09/09/24 156/82   08/30/24 170/81       Health Maintenance   Topic Date Due    DXA SCAN  Never done    ZOSTER VACCINE (1 of 2) Never done    COVID-19 Vaccine (4 - 2023-24 season) 09/01/2024    Pneumococcal Vaccine 65+ (1 of 1 - PCV) 02/23/2025 (Originally 10/8/2000)    TDAP/TD VACCINES (2 - Tdap) 02/23/2025 (Originally 7/24/2015)    ANNUAL " WELLNESS VISIT  05/24/2025    LIPID PANEL  08/14/2025    BMI FOLLOWUP  08/26/2025    RSV Vaccine - Adults  Completed    INFLUENZA VACCINE  Completed       Physical Exam  Vitals reviewed.   Constitutional:       Appearance: Normal appearance. She is well-developed.   HENT:      Head: Normocephalic and atraumatic.      Right Ear: External ear normal.      Left Ear: External ear normal.   Eyes:      Conjunctiva/sclera: Conjunctivae normal.      Pupils: Pupils are equal, round, and reactive to light.   Cardiovascular:      Rate and Rhythm: Normal rate and regular rhythm.      Heart sounds: No murmur heard.     No friction rub. No gallop.   Pulmonary:      Effort: Pulmonary effort is normal.      Breath sounds: Normal breath sounds. No wheezing or rhonchi.   Skin:     General: Skin is warm and dry.   Neurological:      Mental Status: She is alert and oriented to person, place, and time.   Psychiatric:         Mood and Affect: Affect normal.         Behavior: Behavior normal.         Thought Content: Thought content normal.        Physical Exam        Result Review :  The following data was reviewed by: Yoselyn Godfrey MD on 09/23/2024:         Results              Procedures            Assessment & Plan  Primary hypertension    Protein-calorie malnutrition, unspecified severity    Bilateral lower extremity edema    Allergic conjunctivitis of both eyes    Pain of left hip       New Medications Ordered This Visit   Medications    olopatadine (PATANOL) 0.1 % ophthalmic solution     Sig: Administer 1 drop to both eyes 2 (Two) Times a Day.     Dispense:  5 mL     Refill:  1          Assessment & Plan  1. Hypertension.  Her blood pressure remains slightly elevated, albeit improved. Fluid retention appears to have lessened, but mild swelling persists. Continuation of lisinopril is advised to monitor its effect on her blood pressure. Blood work will be conducted today to assess sodium and potassium levels.    2. Weight  loss.  She has experienced significant weight loss of approximately 15 pounds, which could be contributing to her constant fatigue. She was encouraged to increase her food intake and consider supplementing her diet with protein powder and Ensure drinks, consuming at least two servings daily - one in the morning and one before bedtime. A pill to help increase her appetite was discussed but not prescribed.    3. Hip pain.  An x-ray of the hips will be performed today to evaluate the pain.    4. Dry eyes.  A prescription for eyedrops that also alleviate allergies will be provided. Should the eyedrops prove ineffective, she is to inform the provider.    5. Health Maintenance.  A COVID-19 vaccine was recommended. She is advised to wait one more week before getting it, as she received another vaccine a week ago.    Follow-up  Return in 1 month for follow up.    Patient or patient representative verbalized consent for the use of Ambient Listening during the visit with  Yoselyn Godfrey MD for chart documentation. 10/4/2024  13:10 EDT      FOLLOW UP  Return in about 1 month (around 10/23/2024).  Patient was given instructions and counseling regarding her condition or for health maintenance advice. Please see specific information pulled into the AVS if appropriate.     Yoselyn Godfrey MD  10/04/24  07:19 EDT    CURRENT & DISCONTINUED MEDICATIONS  Current Outpatient Medications   Medication Instructions    aspirin 81 mg, Oral, 3 Times Weekly    Calcium Carb-Cholecalciferol (Caltrate 600+D3 Soft) 600-800 MG-UNIT chewable tablet No dose, route, or frequency recorded.    Coenzyme Q10 (Co Q-10) 100 MG capsule No dose, route, or frequency recorded.    famotidine (PEPCID) 40 mg, Oral, Daily    fish oil 1,000 mg, Oral, Daily With Breakfast    lansoprazole (PREVACID SOLUTAB) 15 mg, Oral, Daily    lisinopril (PRINIVIL,ZESTRIL) 40 mg, Oral, Daily    Multiple Vitamins-Minerals (MULTIVITAMIN ADULT EXTRA C PO) multivitamin Oral  Tablet take 1 tablet by oral route once daily with food   Active    olopatadine (PATANOL) 0.1 % ophthalmic solution 1 drop, Both Eyes, 2 Times Daily    vitamin D3 5,000 Units, Oral, Daily       Medications Discontinued During This Encounter   Medication Reason    sennosides-docusate (senna-docusate sodium) 8.6-50 MG per tablet     hydroCHLOROthiazide 12.5 MG tablet

## 2024-09-27 ENCOUNTER — LAB (OUTPATIENT)
Dept: LAB | Facility: HOSPITAL | Age: 89
End: 2024-09-27
Payer: MEDICARE

## 2024-09-27 DIAGNOSIS — E87.6 HYPOKALEMIA: ICD-10-CM

## 2024-09-27 LAB
ANION GAP SERPL CALCULATED.3IONS-SCNC: 9 MMOL/L (ref 5–15)
BUN SERPL-MCNC: 22 MG/DL (ref 8–23)
BUN/CREAT SERPL: 34.9 (ref 7–25)
CALCIUM SPEC-SCNC: 9.6 MG/DL (ref 8.6–10.5)
CHLORIDE SERPL-SCNC: 101 MMOL/L (ref 98–107)
CO2 SERPL-SCNC: 31 MMOL/L (ref 22–29)
CREAT SERPL-MCNC: 0.63 MG/DL (ref 0.57–1)
EGFRCR SERPLBLD CKD-EPI 2021: 85.4 ML/MIN/1.73
GLUCOSE SERPL-MCNC: 84 MG/DL (ref 65–99)
POTASSIUM SERPL-SCNC: 4.5 MMOL/L (ref 3.5–5.2)
SODIUM SERPL-SCNC: 141 MMOL/L (ref 136–145)

## 2024-09-27 PROCEDURE — 80048 BASIC METABOLIC PNL TOTAL CA: CPT

## 2024-09-27 PROCEDURE — 36415 COLL VENOUS BLD VENIPUNCTURE: CPT

## 2024-10-02 ENCOUNTER — TELEPHONE (OUTPATIENT)
Dept: CARDIOLOGY | Facility: CLINIC | Age: 89
End: 2024-10-02
Payer: MEDICARE

## 2024-10-02 NOTE — TELEPHONE ENCOUNTER
----- Message from [place] sent at 9/30/2024  9:35 AM EDT -----  Labs are normal.  Stable kidney function and stable electrolytes.    Patient was supposed to have a 2-week blood pressure check with me.  Please arrange.

## 2024-10-23 ENCOUNTER — OFFICE VISIT (OUTPATIENT)
Dept: INTERNAL MEDICINE | Facility: CLINIC | Age: 89
End: 2024-10-23
Payer: MEDICARE

## 2024-10-23 VITALS
OXYGEN SATURATION: 94 % | WEIGHT: 58.8 LBS | DIASTOLIC BLOOD PRESSURE: 64 MMHG | HEIGHT: 62 IN | BODY MASS INDEX: 10.82 KG/M2 | RESPIRATION RATE: 22 BRPM | HEART RATE: 69 BPM | TEMPERATURE: 98.6 F | SYSTOLIC BLOOD PRESSURE: 132 MMHG

## 2024-10-23 DIAGNOSIS — K64.9 HEMORRHOIDS, UNSPECIFIED HEMORRHOID TYPE: ICD-10-CM

## 2024-10-23 DIAGNOSIS — R53.1 WEAKNESS: Primary | ICD-10-CM

## 2024-10-23 DIAGNOSIS — I10 PRIMARY HYPERTENSION: ICD-10-CM

## 2024-10-23 DIAGNOSIS — R63.0 ANOREXIA: ICD-10-CM

## 2024-10-23 RX ORDER — HYDROCORTISONE ACETATE 25 MG/1
25 SUPPOSITORY RECTAL 2 TIMES DAILY
Qty: 24 EACH | Refills: 0 | Status: SHIPPED | OUTPATIENT
Start: 2024-10-23

## 2024-10-23 NOTE — PROGRESS NOTES
"Chief Complaint  Hypertension and Weight Loss (1 mo f/u )      Subjective      History of Present Illness  The patient presents for evaluation of multiple medical concerns.    She reports persistent leg weakness, which she attributes to muscle fatigue. Despite weekly physical therapy sessions, she has not noticed any improvement. Additionally, she mentions a lack of energy and constant fatigue. Her diet is [place] [facility] [place]. She has considered other living arrangements but is concerned about the lack of 24/7 care. She has gained a pound since her last visit. She consumes one [place] Instant Breakfast daily and is considering increasing this to three times a day.    She is currently taking lisinopril for blood pressure management and is unsure if she should continue. She reports occasional dizziness when moving her head quickly, which she believes is related to her ears. She reports no chest pain or nasal drainage.    She reports an improvement in her hip pain and does not wish to have an x-ray at this time.    She reports worsening hemorrhoids and is unsure how to manage them. She has tried Preparation H without success. She is currently consuming a lot of juices.         Objective   Vital Signs:   Vitals:    10/23/24 1034   BP: 132/64   BP Location: Right arm   Patient Position: Sitting   Cuff Size: Small Adult   Pulse: 69   Resp: 22   Temp: 98.6 °F (37 °C)   TempSrc: Temporal   SpO2: 94%   Weight: 26.7 kg (58 lb 12.8 oz)   Height: 157.5 cm (62.01\")     Body mass index is 10.75 kg/m².    Wt Readings from Last 3 Encounters:   10/23/24 26.7 kg (58 lb 12.8 oz)   09/23/24 26.1 kg (57 lb 8 oz)   09/09/24 29.2 kg (64 lb 6.4 oz)     BP Readings from Last 3 Encounters:   10/23/24 132/64   09/23/24 154/66   09/09/24 156/82       Health Maintenance   Topic Date Due    DXA SCAN  Never done    ZOSTER VACCINE (1 of 2) Never done    " COVID-19 Vaccine (4 - 2023-24 season) 09/01/2024    Pneumococcal Vaccine 65+ (1 of 1 - PCV) 02/23/2025 (Originally 10/8/2000)    TDAP/TD VACCINES (2 - Tdap) 02/23/2025 (Originally 7/24/2015)    ANNUAL WELLNESS VISIT  05/24/2025    LIPID PANEL  08/14/2025    BMI FOLLOWUP  08/26/2025    RSV Vaccine - Adults  Completed    INFLUENZA VACCINE  Completed       Physical Exam  Vitals reviewed.   Constitutional:       Appearance: Normal appearance. She is well-developed.   HENT:      Head: Normocephalic and atraumatic.      Right Ear: External ear normal.      Left Ear: External ear normal.      Ears:      Comments: Bilateral ear fullness  Eyes:      Conjunctiva/sclera: Conjunctivae normal.      Pupils: Pupils are equal, round, and reactive to light.   Cardiovascular:      Rate and Rhythm: Normal rate and regular rhythm.      Heart sounds: No murmur heard.     No friction rub. No gallop.   Pulmonary:      Effort: Pulmonary effort is normal.      Breath sounds: Normal breath sounds. No wheezing or rhonchi.   Skin:     General: Skin is warm and dry.   Neurological:      Mental Status: She is alert and oriented to person, place, and time.   Psychiatric:         Mood and Affect: Affect normal.         Behavior: Behavior normal.         Thought Content: Thought content normal.        Physical Exam  Bilateral ears have a lot of wax.      Result Review :  The following data was reviewed by: Yoselyn Godfrey MD on 10/23/2024:         Results              Procedures            Assessment & Plan  Weakness    Hemorrhoids, unspecified hemorrhoid type    Anorexia    Primary hypertension       New Medications Ordered This Visit   Medications    carbamide peroxide (Debrox) 6.5 % otic solution     Sig: Administer 5 drops into both ears 2 (Two) Times a Day.     Dispense:  22 mL     Refill:  0    hydrocortisone (ANUSOL-HC) 25 MG suppository     Sig: Insert 1 suppository into the rectum 2 (Two) Times a Day.     Dispense:  24 each     Refill:   0          Assessment & Plan  1. Weight loss.  Her weight loss is likely contributing to her dizziness and fatigue. Blood pressure readings are within normal range today. She was advised to consume [place] Instant Breakfast three times daily to aid in weight gain. Continuation of lisinopril was recommended. Debrox was prescribed for ear wax removal, which she can administer at home. A referral for physical therapy was made. Should the Debrox prove ineffective, an in-office ear irrigation will be performed.    2. Hip pain.  An x-ray of the hip was offered but declined by her.    3. Hemorrhoids.  A hydrocortisone suppository was prescribed for use once or twice daily to alleviate inflammation. Fiber gummies were also prescribed to aid in stool softening. She was advised to avoid constipation and to consume prune juice and other juices.    Follow-up  Return in 1 month for follow up.    Patient or patient representative verbalized consent for the use of Ambient Listening during the visit with  Yoselyn Godfrey MD for chart documentation. 11/3/2024  18:30 EST      FOLLOW UP  Return in about 1 month (around 11/23/2024).  Patient was given instructions and counseling regarding her condition or for health maintenance advice. Please see specific information pulled into the AVS if appropriate.     Yoselyn Godfrey MD  10/23/24  11:28 EDT    CURRENT & DISCONTINUED MEDICATIONS  Current Outpatient Medications   Medication Instructions    aspirin 81 mg, 3 Times Weekly    Calcium Carb-Cholecalciferol (Caltrate 600+D3 Soft) 600-800 MG-UNIT chewable tablet No dose, route, or frequency recorded.    carbamide peroxide (Debrox) 6.5 % otic solution 5 drops, Both Ears, 2 Times Daily    Coenzyme Q10 (Co Q-10) 100 MG capsule No dose, route, or frequency recorded.    famotidine (PEPCID) 40 mg, Oral, Daily    fish oil 1,000 mg, Daily With Breakfast    hydrocortisone (ANUSOL-HC) 25 mg, Rectal, 2 Times Daily    lansoprazole (PREVACID  SOLUTAB) 15 mg, Oral, Daily    lisinopril (PRINIVIL,ZESTRIL) 40 mg, Oral, Daily    Multiple Vitamins-Minerals (MULTIVITAMIN ADULT EXTRA C PO) multivitamin Oral Tablet take 1 tablet by oral route once daily with food   Active    vitamin D3 5,000 Units, Daily       Medications Discontinued During This Encounter   Medication Reason    olopatadine (PATANOL) 0.1 % ophthalmic solution

## 2024-10-25 ENCOUNTER — PRIOR AUTHORIZATION (OUTPATIENT)
Dept: INTERNAL MEDICINE | Facility: CLINIC | Age: 89
End: 2024-10-25
Payer: MEDICARE

## 2024-10-25 NOTE — TELEPHONE ENCOUNTER
Anucort-HC 25MG suppositories    We have reviewed your request for ANUCORT-HC 25 MG SUPPOSITORY submitted for the   member identified above, and we denied your request for the following reason:  because this drug’s MEGAN (Drug Efficacy Study Implementation) code does not meet the   definition of a Part D drug. Therefore, it is not covered under Medicare Part D. This is not a   medical necessity decision. It is a benefit issue only. We based this decision on Chapter 6   Section 10.2 of the Medicare Prescription Drug benefit manual. For more information, talk to   your prescriber or call 1-800-MEDICARE.  You have the right to request a [id number] conversation. If you have any questions, please   contact the Pharmacy Department at [phone] option 2. Please note that a copy of the   denial letter containing the appeal and/or grievance process will be mailed to your office.  If you have any questions, please contact the Prior Authorization department at   [phone].

## 2024-10-28 NOTE — TELEPHONE ENCOUNTER
Please call and let her know that her insurance did not want to cover the original medication I sent in so I sent in a different 1.

## 2024-11-18 ENCOUNTER — TELEPHONE (OUTPATIENT)
Dept: INTERNAL MEDICINE | Facility: CLINIC | Age: 89
End: 2024-11-18
Payer: MEDICARE

## 2024-11-18 DIAGNOSIS — Z74.09 MOBILITY IMPAIRED: ICD-10-CM

## 2024-11-18 DIAGNOSIS — R26.89 BALANCE DISORDER: ICD-10-CM

## 2024-11-18 DIAGNOSIS — R53.1 WEAKNESS: Primary | ICD-10-CM

## 2024-11-18 DIAGNOSIS — M41.20 OTHER IDIOPATHIC SCOLIOSIS, UNSPECIFIED SPINAL REGION: ICD-10-CM

## 2024-11-18 NOTE — TELEPHONE ENCOUNTER
Attempted to call pt, unable to reach pt, left voicemail for a return call to the office, new walker has been ordered.

## 2024-11-18 NOTE — TELEPHONE ENCOUNTER
Caller: Emi Ball    Relationship to patient: Self    Best call back number: [phone]    Patient is needing: PATIENT CALLED REQUESTING TO SPEAK WITH CLINICAL STAFF. PATIENT STATES ONE OF THE WHEELS ON HER WALKER BROKE OFF AND NOW SHE IS UNABLE TO USE HER WALKER. PATIENT STATES SHE IS NEEDING TO KNOW HOW TO GET THIS FIXED OR REPLACED. PATIENT STATES IT IS URGENT TO HAVE THIS FIXED AS SHE IS UNABLE TO MOVE/WALK AROUND WITHOUT THE WALKER.

## 2024-11-19 NOTE — TELEPHONE ENCOUNTER
Patient called back. She needs a rollator with seat. I have amended this order for walker and new order was uploaded into [facility]. Nancy was notified and states they have a jr rollator in stock and can likely get it delivered to her tomorrow. They will process this order in the morning. Patient was notified and will call back if she has any other issues.

## 2024-11-19 NOTE — TELEPHONE ENCOUNTER
Called and spoke with pt, explained to pt I have ordered her a new walker and it is being processed through [facility], pt asked for the number for [employer], provided pt with the number to their office, pt had no further questions at this time.

## 2024-11-25 ENCOUNTER — OFFICE VISIT (OUTPATIENT)
Dept: INTERNAL MEDICINE | Facility: CLINIC | Age: 89
End: 2024-11-25
Payer: MEDICARE

## 2024-11-25 VITALS
SYSTOLIC BLOOD PRESSURE: 178 MMHG | BODY MASS INDEX: 11.04 KG/M2 | WEIGHT: 60 LBS | RESPIRATION RATE: 18 BRPM | TEMPERATURE: 98.9 F | HEART RATE: 103 BPM | DIASTOLIC BLOOD PRESSURE: 84 MMHG | OXYGEN SATURATION: 93 % | HEIGHT: 62 IN

## 2024-11-25 DIAGNOSIS — R06.00 DYSPNEA, UNSPECIFIED TYPE: Primary | ICD-10-CM

## 2024-11-25 DIAGNOSIS — K59.00 CONSTIPATION, UNSPECIFIED CONSTIPATION TYPE: ICD-10-CM

## 2024-11-25 DIAGNOSIS — R60.0 PEDAL EDEMA: ICD-10-CM

## 2024-11-25 DIAGNOSIS — R63.4 WEIGHT LOSS: ICD-10-CM

## 2024-11-25 DIAGNOSIS — I10 PRIMARY HYPERTENSION: ICD-10-CM

## 2024-11-25 PROCEDURE — 1160F RVW MEDS BY RX/DR IN RCRD: CPT | Performed by: INTERNAL MEDICINE

## 2024-11-25 PROCEDURE — 1125F AMNT PAIN NOTED PAIN PRSNT: CPT | Performed by: INTERNAL MEDICINE

## 2024-11-25 PROCEDURE — G2211 COMPLEX E/M VISIT ADD ON: HCPCS | Performed by: INTERNAL MEDICINE

## 2024-11-25 PROCEDURE — 99214 OFFICE O/P EST MOD 30 MIN: CPT | Performed by: INTERNAL MEDICINE

## 2024-11-25 PROCEDURE — 1159F MED LIST DOCD IN RCRD: CPT | Performed by: INTERNAL MEDICINE

## 2024-11-25 RX ORDER — MAG HYDROX/ALUMINUM HYD/SIMETH 400-400-40
1 SUSPENSION, ORAL (FINAL DOSE FORM) ORAL AS NEEDED
Qty: 25 EACH | Refills: 1 | Status: SHIPPED | OUTPATIENT
Start: 2024-11-25

## 2024-11-25 RX ORDER — HYDROCHLOROTHIAZIDE 12.5 MG/1
12.5 TABLET ORAL DAILY
Qty: 90 TABLET | Refills: 1 | Status: SHIPPED | OUTPATIENT
Start: 2024-11-25

## 2024-11-25 NOTE — PROGRESS NOTES
"Chief Complaint  Follow-up (1 month follow up, has a stomach ache today as well and tired all the time, can not stand long and out of breath at times ) and Hypertension      Subjective      History of Present Illness  The patient presents for evaluation of multiple medical concerns.    She reports a weight gain of 2 pounds, which she attributes to the poor quality of food at her current residence. She is considering moving back to her previous apartment complex. Additionally, she mentions that her reflux symptoms have improved.    She experienced constipation two days ago, which was not relieved by MiraLAX. Today, she reports stomach cramps but no nausea. She believes an enema might be more effective than MiraLAX. She also mentions having large hemorrhoids but no diarrhea.    She reports persistent swelling in her feet, which has slightly improved. She has not tried compression stockings but finds that elevating her feet provides some relief.    Her blood pressure readings at home are typically around 135/40. She reports no chest pain or headaches. She experiences shortness of breath once a day, regardless of her activity level. She is currently taking lisinopril and aspirin without any issues.         Objective   Vital Signs:   Vitals:    11/25/24 1236   BP: 178/84   BP Location: Left arm   Patient Position: Sitting   Cuff Size: Pediatric   Pulse: 103   Resp: 18   Temp: 98.9 °F (37.2 °C)   TempSrc: Temporal   SpO2: 93%   Weight: 27.2 kg (60 lb)   Height: 157.5 cm (62.01\")     Body mass index is 10.97 kg/m².    Wt Readings from Last 3 Encounters:   11/25/24 27.2 kg (60 lb)   10/23/24 26.7 kg (58 lb 12.8 oz)   09/23/24 26.1 kg (57 lb 8 oz)     BP Readings from Last 3 Encounters:   11/25/24 178/84   10/23/24 132/64   09/23/24 154/66       Health Maintenance   Topic Date Due    DXA SCAN  Never done    ZOSTER VACCINE (1 of 2) Never done    COVID-19 Vaccine (4 - 2024-25 season) 09/01/2024    Pneumococcal Vaccine 65+ (1 " of 1 - PCV) 02/23/2025 (Originally 10/8/2000)    TDAP/TD VACCINES (2 - Tdap) 02/23/2025 (Originally 7/24/2015)    ANNUAL WELLNESS VISIT  05/24/2025    LIPID PANEL  08/14/2025    BMI FOLLOWUP  10/23/2025    RSV Vaccine - Adults  Completed    INFLUENZA VACCINE  Completed       Physical Exam  Vitals reviewed.   Constitutional:       Appearance: Normal appearance. She is well-developed.      Comments: Quite thin   HENT:      Head: Normocephalic and atraumatic.      Right Ear: External ear normal.      Left Ear: External ear normal.   Eyes:      General: Scleral icterus: a.      Conjunctiva/sclera: Conjunctivae normal.      Pupils: Pupils are equal, round, and reactive to light.   Cardiovascular:      Rate and Rhythm: Normal rate and regular rhythm.      Heart sounds: No murmur heard.     No friction rub. No gallop.      Comments: edema  Pulmonary:      Effort: Pulmonary effort is normal.      Breath sounds: Normal breath sounds. No wheezing or rhonchi.   Musculoskeletal:      Comments: Significant kyphosis and scoliosis   Skin:     General: Skin is warm and dry.   Neurological:      Mental Status: She is alert and oriented to person, place, and time.   Psychiatric:         Mood and Affect: Affect normal.         Behavior: Behavior normal.         Thought Content: Thought content normal.        Physical Exam        Result Review :  The following data was reviewed by: Yoselyn Godfrey MD on 11/25/2024:         Results              Procedures            Assessment & Plan  Dyspnea, unspecified type    Orders:    Adult Transthoracic Echo Complete w/ Color, Spectral and Contrast if necessary per protocol; Future    Constipation, unspecified constipation type         Pedal edema         Primary hypertension           Weight loss              Assessment & Plan  1. Constipation.  She experiences cramping and difficulty using the bathroom, which may be related to her use of MiraLAX. A glycerin suppository was prescribed to help  with bowel movements. She was advised to maintain adequate hydration and incorporate physical activity into her routine. If the symptoms persist, she may consider using an enema.    2. Edema.  She reports swelling in her feet, which has slightly improved but still makes it difficult to wear certain shoes. The use of compression stockings was recommended, and she was advised to continue elevating her feet.    3. Hypertension.  Her blood pressure was significantly elevated at 170/80 during the visit. She was advised to monitor her blood pressure at home and report any persistently high readings. Hydrochlorothiazide was prescribed to help manage her blood pressure and reduce fluid retention. An echocardiogram was ordered to assess for potential fluid accumulation around her heart, which may be contributing to her breathing difficulties. If she experiences symptoms such as chest pain, headache, or vision problems, she should seek immediate medical attention at the ER.    4. Weight loss.  She was advised to consume three protein shakes daily to help with weight maintenance. Samples of PediaSure were provided.    5. Gastroesophageal Reflux Disease (GERD).  She was reminded to continue taking her reflux medication daily to manage her symptoms and prevent potential voice issues related to silent reflux.    Follow-up  Return in 1 month for follow up.    Patient or patient representative verbalized consent for the use of Ambient Listening during the visit with  Yoselyn Godfrey MD for chart documentation. 11/26/2024  16:02 EST      FOLLOW UP  Return in about 1 month (around 12/25/2024).  Patient was given instructions and counseling regarding her condition or for health maintenance advice. Please see specific information pulled into the AVS if appropriate.     Yoselyn Godfrey MD  11/26/24  16:01 EST    CURRENT & DISCONTINUED MEDICATIONS  Current Outpatient Medications   Medication Instructions    aspirin 81 mg, 3 Times  Weekly    Calcium Carb-Cholecalciferol (Caltrate 600+D3 Soft) 600-800 MG-UNIT chewable tablet No dose, route, or frequency recorded.    carbamide peroxide (Debrox) 6.5 % otic solution 5 drops, Both Ears, 2 Times Daily    Coenzyme Q10 (Co Q-10) 100 MG capsule No dose, route, or frequency recorded.    famotidine (PEPCID) 40 mg, Oral, Daily    fish oil 1,000 mg, Daily With Breakfast    glycerin adult 2 g suppository 1 suppository, Rectal, As Needed    hydroCHLOROthiazide 12.5 mg, Oral, Daily    hydrocortisone (ANUSOL-HC) 25 mg, Rectal, 2 Times Daily    Hydrocortisone Ace-Pramoxine 1.85-1.15 % cream 1 Application, Apply externally, 2 Times Daily PRN    lansoprazole (PREVACID SOLUTAB) 15 mg, Oral, Daily    lisinopril (PRINIVIL,ZESTRIL) 40 mg, Oral, Daily    Multiple Vitamins-Minerals (MULTIVITAMIN ADULT EXTRA C PO) multivitamin Oral Tablet take 1 tablet by oral route once daily with food   Active    vitamin D3 5,000 Units, Daily       There are no discontinued medications.

## 2024-12-03 ENCOUNTER — TELEPHONE (OUTPATIENT)
Dept: INTERNAL MEDICINE | Facility: CLINIC | Age: 89
End: 2024-12-03
Payer: MEDICARE

## 2024-12-03 NOTE — TELEPHONE ENCOUNTER
Spoke with patient and she stated she did not wanted to have echo done at the hospital, I explain to her that they don't do that at [facility], patient requested referral to be sent to another hospital, please advise

## 2024-12-03 NOTE — TELEPHONE ENCOUNTER
Caller: Emi Ball    Relationship: Self    Best call back number: [phone]     What orders are you requesting (i.e. lab or imaging): Adult Transthoracic Echo Complete w/ Color, Spectral and Contrast if necessary per protocol     In what timeframe would the patient need to come in: ASAP    Where will you receive your lab/imaging services: [facility]

## 2024-12-04 NOTE — TELEPHONE ENCOUNTER
I believe she can have it done at [facility] if she wishes not sure there is anywhere else here in town to do it.

## 2024-12-13 ENCOUNTER — TELEPHONE (OUTPATIENT)
Dept: INTERNAL MEDICINE | Facility: CLINIC | Age: 89
End: 2024-12-13
Payer: MEDICARE

## 2024-12-13 NOTE — TELEPHONE ENCOUNTER
"Caller: Emi Ball    Relationship: Self    Best call back number: [phone]    Requested Prescriptions:   \"MEDICINE BEFORE HYDROCHLOROTHIAZIDE\"     Pharmacy where request should be sent: [facility] [id number] - [place] - [address] AT [facility] [address] - [phone] [facility] [phone]      Last office visit with prescribing clinician: 11/25/2024   Last telemedicine visit with prescribing clinician: Visit date not found   Next office visit with prescribing clinician: 1/8/2025     Additional details provided by patient: PATIENT BELIEVES THE HYDROCHLOROTHIAZIDE MAKES HER FEEL BAD AND SHE WOULD LIKE THE MEDICINE BEFORE THAT WAS PRESCRIBED.     Does the patient have less than a 3 day supply:  [x] Yes  [] No    Would you like a call back once the refill request has been completed: [x] Yes [] No    If the office needs to give you a call back, can they leave a voicemail: [x] Yes [] No    Kal White Rep   12/13/24 14:33 EST     "

## 2024-12-13 NOTE — TELEPHONE ENCOUNTER
"Pt called into office today to report she believes her hydrochlorothiazide is causing her to feel short of breath and dizzy, pt stated she feels lightheaded and \"feels strange\", pt stated \"her whole face feels funny\", pt stated she did not take her dose today due to feeling these side effects and isn't sure what to do.   "

## 2025-01-08 ENCOUNTER — OFFICE VISIT (OUTPATIENT)
Dept: INTERNAL MEDICINE | Facility: CLINIC | Age: OVER 89
End: 2025-01-08
Payer: MEDICARE

## 2025-01-08 VITALS
HEART RATE: 56 BPM | SYSTOLIC BLOOD PRESSURE: 130 MMHG | RESPIRATION RATE: 22 BRPM | OXYGEN SATURATION: 95 % | BODY MASS INDEX: 13.75 KG/M2 | DIASTOLIC BLOOD PRESSURE: 78 MMHG | TEMPERATURE: 99.4 F | WEIGHT: 75.2 LBS

## 2025-01-08 DIAGNOSIS — M41.20 OTHER IDIOPATHIC SCOLIOSIS, UNSPECIFIED SPINAL REGION: ICD-10-CM

## 2025-01-08 DIAGNOSIS — E46 PROTEIN-CALORIE MALNUTRITION, UNSPECIFIED SEVERITY: ICD-10-CM

## 2025-01-08 DIAGNOSIS — K64.9 HEMORRHOIDS, UNSPECIFIED HEMORRHOID TYPE: ICD-10-CM

## 2025-01-08 DIAGNOSIS — R07.9 RIGHT-SIDED CHEST PAIN: Primary | ICD-10-CM

## 2025-01-08 DIAGNOSIS — I10 PRIMARY HYPERTENSION: ICD-10-CM

## 2025-01-08 DIAGNOSIS — K59.00 CONSTIPATION, UNSPECIFIED CONSTIPATION TYPE: ICD-10-CM

## 2025-01-08 DIAGNOSIS — R26.2 DIFFICULTY WALKING: ICD-10-CM

## 2025-01-08 RX ORDER — DIMENHYDRINATE 50 MG
TABLET ORAL
Status: CANCELLED | OUTPATIENT
Start: 2025-01-08

## 2025-01-08 NOTE — ASSESSMENT & PLAN NOTE
Orders:    Ambulatory Referral to Chronic Care Management    Ambulatory Referral to Home Health

## 2025-01-08 NOTE — PROGRESS NOTES
Chief Complaint  Hypertension (Follow up ), Weight Loss, and Hemorrhoids      Subjective      History of Present Illness  The patient presents for weight loss, hemorrhoids, muscle pain, dizziness, and right foot swelling.    She reports persistent fatigue, a dislike for food, and a 15-pound weight loss attributed to PediaSure consumption. She wishes to continue using PediaSure but cannot find it elsewhere. She mentions an increased appetite and incorporates microwaved potatoes into her diet despite her daughter's concerns.    Her hemorrhoids have enlarged and become painful. She experiences constipation and uses MiraLAX. Over-the-counter creams have been ineffective, and she prefers cream over suppositories.    She describes mild pain during bowel movements as muscular. She has not started physical therapy due to lack of communication from the therapist and prefers home-based therapy. She requests a taller walker.    She continues to experience dizziness, especially with sudden head movements.    She reports occasional right foot swelling, which does not extend up her leg. She has stopped driving.    She reports no current hip pain.             Objective   Vital Signs:   Vitals:    01/08/25 1342   BP: 130/78   BP Location: Left arm   Patient Position: Sitting   Cuff Size: Small Adult   Pulse: 56   Resp: 22   Temp: 99.4 °F (37.4 °C)   TempSrc: Temporal   SpO2: 95%   Weight: 34.1 kg (75 lb 3.2 oz)     Body mass index is 13.75 kg/m².    Wt Readings from Last 3 Encounters:   01/08/25 34.1 kg (75 lb 3.2 oz)   11/25/24 27.2 kg (60 lb)   10/23/24 26.7 kg (58 lb 12.8 oz)     BP Readings from Last 3 Encounters:   01/08/25 130/78   11/25/24 178/84   10/23/24 132/64       Health Maintenance   Topic Date Due    DXA SCAN  Never done    ZOSTER VACCINE (1 of 2) Never done    COVID-19 Vaccine (4 - 2024-25 season) 09/01/2024    Pneumococcal Vaccine 65+ (1 of 1 - PCV) 02/23/2025 (Originally 10/8/2000)    TDAP/TD VACCINES (2 - Tdap)  02/23/2025 (Originally 7/24/2015)    ANNUAL WELLNESS VISIT  05/24/2025    LIPID PANEL  08/14/2025    BMI FOLLOWUP  10/23/2025    RSV Vaccine - Adults  Completed    INFLUENZA VACCINE  Completed       Physical Exam  Vitals reviewed.   Constitutional:       Appearance: Normal appearance. She is well-developed.   HENT:      Head: Normocephalic and atraumatic.      Right Ear: External ear normal.      Left Ear: External ear normal.   Eyes:      Conjunctiva/sclera: Conjunctivae normal.      Pupils: Pupils are equal, round, and reactive to light.   Cardiovascular:      Rate and Rhythm: Normal rate and regular rhythm.      Heart sounds: No murmur heard.     No friction rub. No gallop.   Pulmonary:      Effort: Pulmonary effort is normal.      Breath sounds: Normal breath sounds. No wheezing or rhonchi.   Musculoskeletal:      Comments: Slight right foot swelling.  Significant kyphosis and scoliosis using walker   Skin:     General: Skin is warm and dry.   Neurological:      Mental Status: She is alert and oriented to person, place, and time.   Psychiatric:         Mood and Affect: Affect normal.         Behavior: Behavior normal.         Thought Content: Thought content normal.        Physical Exam          Result Review :  The following data was reviewed by: Yoselyn Godfrey MD on 01/08/2025:         Results             Procedures            Assessment & Plan  Right-sided chest pain    Orders:    Ambulatory Referral to Chronic Care Management    Constipation, unspecified constipation type    Orders:    Ambulatory Referral to Chronic Care Management    Difficulty walking    Orders:    Ambulatory Referral to Chronic Care Management    Ambulatory Referral to Home Health    Primary hypertension           Protein-calorie malnutrition, unspecified severity      Orders:    Ambulatory Referral to Chronic Care Management    Ambulatory Referral to Home Health    Hemorrhoids, unspecified hemorrhoid type         Other idiopathic  scoliosis, unspecified spinal region    Orders:    Ambulatory Referral to Home Health         Assessment & Plan  1. Weight loss  - Lost 15 pounds, remains 20 pounds under desired weight  - Fatigue expected to improve with continued nutritional intake  - Advised to maintain current dietary regimen, including PediaSure  - Prescription for PediaSure will be provided (insurance coverage may vary)    2. Hemorrhoids  - Advised to continue MiraLAX every other day to maintain soft stools  - Prescription for a stronger cream will be sent to [facility] pharmacy  - If symptoms persist, consider referral to a specialist for potential surgical intervention    3. Muscle pain  - Pain likely muscular  - Encouraged to continue Jaren Chi and incorporate home health physical therapy  - Referral to Ronel, a nurse, for assistance with obtaining a taller walker and coordinating physical therapy    4. Dizziness  - Advised to monitor symptoms and report any worsening    5. Right foot swelling  - Slight right foot swelling, not extending up the leg  - Blood work from August was normal, no repeat blood work necessary at this time    Patient or patient representative verbalized consent for the use of Ambient Listening during the visit with  Yoselyn Godfrey MD for chart documentation. 1/8/2025  14:25 EST      FOLLOW UP  No follow-ups on file.  Patient was given instructions and counseling regarding her condition or for health maintenance advice. Please see specific information pulled into the AVS if appropriate.     Yoselyn Godfrey MD  01/08/25  14:26 EST    CURRENT & DISCONTINUED MEDICATIONS  Current Outpatient Medications   Medication Instructions    aspirin 81 mg, 3 Times Weekly    Calcium Carb-Cholecalciferol (Caltrate 600+D3 Soft) 600-800 MG-UNIT chewable tablet No dose, route, or frequency recorded.    carbamide peroxide (Debrox) 6.5 % otic solution 5 drops, Both Ears, 2 Times Daily    Coenzyme Q10 (Co Q-10) 100 MG capsule No dose,  route, or frequency recorded.    famotidine (PEPCID) 40 mg, Oral, Daily    fish oil 1,000 mg, Daily With Breakfast    glycerin adult 2 g suppository 1 suppository, Rectal, As Needed    hydroCHLOROthiazide 12.5 mg, Oral, Daily    Hydrocortisone Ace-Pramoxine 1.85-1.15 % cream 1 Application, Apply externally, 2 Times Daily PRN    lansoprazole (PREVACID SOLUTAB) 15 mg, Oral, Daily    lisinopril (PRINIVIL,ZESTRIL) 40 mg, Oral, Daily    Multiple Vitamins-Minerals (MULTIVITAMIN ADULT EXTRA C PO) multivitamin Oral Tablet take 1 tablet by oral route once daily with food   Active    vitamin D3 5,000 Units, Daily       Medications Discontinued During This Encounter   Medication Reason    Hydrocortisone Ace-Pramoxine 1.85-1.15 % cream Reorder    hydrocortisone (ANUSOL-HC) 25 MG suppository *Therapy completed

## 2025-01-09 ENCOUNTER — REFERRAL TRIAGE (OUTPATIENT)
Dept: CASE MANAGEMENT | Facility: OTHER | Age: OVER 89
End: 2025-01-09
Payer: MEDICARE

## 2025-01-10 ENCOUNTER — TELEPHONE (OUTPATIENT)
Dept: INTERNAL MEDICINE | Facility: CLINIC | Age: OVER 89
End: 2025-01-10
Payer: MEDICARE

## 2025-01-10 NOTE — TELEPHONE ENCOUNTER
Alayna with [facility] health calling for verbal orders for PT for 1w1 2w2 1w6, please advise

## 2025-01-14 ENCOUNTER — PATIENT OUTREACH (OUTPATIENT)
Dept: CASE MANAGEMENT | Facility: OTHER | Age: OVER 89
End: 2025-01-14
Payer: MEDICARE

## 2025-01-14 DIAGNOSIS — I21.9 MYOCARDIAL INFARCTION, UNSPECIFIED MI TYPE, UNSPECIFIED ARTERY: Primary | ICD-10-CM

## 2025-01-14 DIAGNOSIS — K21.9 GASTROESOPHAGEAL REFLUX DISEASE WITHOUT ESOPHAGITIS: ICD-10-CM

## 2025-01-14 DIAGNOSIS — I10 PRIMARY HYPERTENSION: ICD-10-CM

## 2025-01-14 DIAGNOSIS — R26.2 DIFFICULTY WALKING: ICD-10-CM

## 2025-01-14 DIAGNOSIS — E46 PROTEIN-CALORIE MALNUTRITION, UNSPECIFIED SEVERITY: ICD-10-CM

## 2025-01-15 ENCOUNTER — PATIENT OUTREACH (OUTPATIENT)
Dept: CASE MANAGEMENT | Facility: OTHER | Age: OVER 89
End: 2025-01-15
Payer: MEDICARE

## 2025-01-15 ENCOUNTER — TELEPHONE (OUTPATIENT)
Dept: CASE MANAGEMENT | Facility: OTHER | Age: OVER 89
End: 2025-01-15

## 2025-01-15 DIAGNOSIS — I21.9 MYOCARDIAL INFARCTION, UNSPECIFIED MI TYPE, UNSPECIFIED ARTERY: Primary | ICD-10-CM

## 2025-01-15 DIAGNOSIS — I10 PRIMARY HYPERTENSION: ICD-10-CM

## 2025-01-15 RX ORDER — HYDROCORTISONE 25 MG/G
1 CREAM TOPICAL EVERY 12 HOURS SCHEDULED
Status: CANCELLED | OUTPATIENT
Start: 2025-01-15

## 2025-01-15 NOTE — TELEPHONE ENCOUNTER
I spoke with this patient for an extended period of time on the phone yesterday. The follow issues, I need your input on please.    1-I need clarification about the medications she should be on. She told me she is only on Lisinipril but then she also talked about a medication that dissolves in her mouth.    2-Insurance will not cover Nutritional Supplements. I called and spoke with Miah Espinal and [facility]. No one is able to bill for them. The patient did state that her daughter sent her some.    3-The hemorrhoid cream was not covered by insurance. It would cost the patient $300. Hydrocortisone 2.5% cream 2 tubes is covered at no cost to the patient. I have pended the order for you to sign, if agreeable.    4-The patient did not show up for the echo that was ordered. Is this something that needs to be rescheduled?    5-I do not see a follow up cardiology appointment. Does she need a cardiology appt scheduled?    Thank you for your assistance with this.  MEGAN Rodney

## 2025-01-15 NOTE — OUTREACH NOTE
AMBULATORY CASE MANAGEMENT NOTE    Names and Relationships of Patient/Support Persons: Contact: [facility] [id number] - [place], [place] - [address] AT [facility] [address] - [phone]  - [phone] FX; Relationship: Pharmacy -     Care Coordination    I spoke with [facility] pharmacy, [facility] and [facility], none of them can bill for nutritional supplements.    I also spoke to Lazara with [facility] pharmacy regarding the patient's prescription for hemorrhoid cream. She informed me that these creams are no longer FDA approved so insurance will not cover them. She stated that hydrocortisone 2.5% rectal cream would be covered by the patient's insurance at 100%. I pended an order for PCP to sign.    I also pended an order to the PCP to clarify the patient's medication list, the need for an echo and a cardiology appointment. I am awaiting a response.    I called [facility] and spoke with Blanca. I was able to gather information regarding services they provide and the cost for them. They are as follows:      I have had communication with PCP regarding OT and skilled nursing for patient. She is agreeable to this. I contacted [facility] and they are adding these services for the patient.    Ronel MARQUIS  Ambulatory Case Management    1/15/2025, 08:50 EST

## 2025-01-15 NOTE — TELEPHONE ENCOUNTER
Per our conversation, I am also pending an order for OT and skilled nursing with [facility].I have already contacted them to start services. PT is scheduled to see the patient today 1/15.    Thank you again!

## 2025-01-20 ENCOUNTER — TELEPHONE (OUTPATIENT)
Dept: CASE MANAGEMENT | Facility: OTHER | Age: OVER 89
End: 2025-01-20

## 2025-01-22 ENCOUNTER — OUTSIDE FACILITY SERVICE (OUTPATIENT)
Dept: INTERNAL MEDICINE | Facility: CLINIC | Age: OVER 89
End: 2025-01-22
Payer: MEDICARE

## 2025-01-22 PROCEDURE — G0180 MD CERTIFICATION HHA PATIENT: HCPCS | Performed by: INTERNAL MEDICINE

## 2025-01-22 RX ORDER — HYDROCORTISONE 25 MG/G
1 CREAM TOPICAL 2 TIMES DAILY
Qty: 28 G | Refills: 1 | Status: SHIPPED | OUTPATIENT
Start: 2025-01-22

## 2025-01-22 NOTE — TELEPHONE ENCOUNTER
1- the dissolvable medicine I think is her lansoprazole  I am ok if she is just on the lisinopril and not hctz as long as her bp is runnign well    2- yeah unfortunately they don't often.  I guess she will just have to buy them    3- sending now    4- they should be able to use the old order to reschedule; but we need to ensure she is going to go    5- good question depends.. honestly if echo is good and I can control her bp doesn't necessarily have to see them.

## 2025-01-31 ENCOUNTER — PATIENT OUTREACH (OUTPATIENT)
Dept: CASE MANAGEMENT | Facility: OTHER | Age: OVER 89
End: 2025-01-31
Payer: MEDICARE

## 2025-01-31 DIAGNOSIS — I21.9 MYOCARDIAL INFARCTION, UNSPECIFIED MI TYPE, UNSPECIFIED ARTERY: Primary | ICD-10-CM

## 2025-01-31 DIAGNOSIS — I10 PRIMARY HYPERTENSION: ICD-10-CM

## 2025-01-31 NOTE — OUTREACH NOTE
AMBULATORY CASE MANAGEMENT NOTE    Names and Relationships of Patient/Support Persons:  -     [facility] End of Month Documentation    This Chronic Medical Management Care Plan for Emi Ball, 89 y.o. female, has been a new plan of care implemented; established and a new plan of care implemented for the month of January.  A cumulative time of 128  minutes was spent on this patient record this month, including phone call with patient; phone call with pharmacist; electronic communication with primary care provider; chart review, talking with patient, medication reconciliation, speaking with pharmacy.    Regarding the patient's problems: has Gastroesophageal reflux; Myocardial infarction; Otalgia; Pain, head; Scoliosis; Vitamin D deficiency; Protein-calorie malnutrition, unspecified severity; Closed fracture of right inferior pubic ramus; Osteopenia; Debility; Difficulty walking; At high risk for falls; Seasonal allergies; and Primary hypertension on their problem list., the following items were addressed: medical records; medications; changes to medical care and any changes can be found within the plan section of the note.  A detailed listing of time spent for chronic care management is tracked within each outreach encounter.  Current medications include:  has a current medication list which includes the following prescription(s): aspirin, caltrate 600+d3 soft, carbamide peroxide, co q-10, famotidine, glycerin adult, hydrochlorothiazide, hydrocortisone, hydrocortisone ace-pramoxine, lansoprazole, lisinopril, multiple vitamins-minerals, pediasure grow & gain, fish oil, and vitamin d3. and the patient is reported to be patient is noncompliant with medication protocol,  Medications are reported to be non-effective in controlling symptoms and changes have been made to the medication protocol.  Regarding these diagnoses, referrals were made to the following provider(s):  N/A.  All notes on chart for PCP to review.    The  patient was monitored remotely for blood pressure; activity level; weight; medications.    The patient's physical needs include:  help taking medications as prescribed; physical healthcare; medication education; needs assistance with ADLs; resources for disability needs; hearing care, Patient needs someone to assist her to shower herself more than once a week.     The patient's mental support needs include:  not applicable    The patient's cognitive support needs include:  medication; increased support; coordination of community providers; needs assistance with ADLs; personal care; health care; caregiver, Lives in Assisted Living    The patient's psychosocial support needs include:  need for increased support; medication management or adherence; coordination of community providers    The patient's functional needs include: physical healthcare; needs assistance for ADLs; medication education; hearing care    The patient's environmental needs include:  not applicable    Care Plan overall comments:  Established    Refer to previous outreach notes for more information on the areas listed above.    Monthly Billing Diagnoses  (I21.9) Myocardial infarction, unspecified MI type, unspecified artery    (I10) Primary hypertension    Medications   Medications have been reconciled    Care Plan progress this month:      Recently Modified Care Plans Updates made since 12/31/2024 12:00 AM      No recently modified care plans.             Fall Risk       Prevent Falls and Injury       Start:  01/14/25    Expected End:  07/14/25         My Fall Prevention To Do List       Start:  01/14/25         Why is this important?  Most falls happen when it is hard for you to walk safely. Your balance may be off because of an illness. You may have pain in your knees, hip or other joints.  You may be overly tired or taking medicines that make you sleepy. You may not be able to see or hear clearly.  Falls can lead to broken bones, bruises or other  injuries.  There are things you can do to help prevent falling.              Optimal Care Coordination of a Patient Experiencing Fall Risk       Start:  01/14/25    Expected End:  07/14/25         Identify and Manage Contributors to Fall Risk       Start:  01/14/25                Current Specialty Plan of Care Status signed by both patient and provider    Instructions   Patient was provided an electronic copy of care plan  CCM services were explained and offered and patient has accepted these services.  Patient has given their written consent to receive CCM services and understands that this includes the authorization of electronic communication of medical information with the other treating providers.  Patient understands that they may stop CCM services at any time and these changes will be effective at the end of the calendar month and will effectively revocate the agreement of CCM services.  Patient understands that only one practitioner can furnish and be paid for CCM services during one calendar month.  Patient also understands that there may be co-payment or deductible fees in association with CCM services.  Patient will continue with at least monthly follow-up calls with the Ambulatory .    Ronel MARQUIS  Ambulatory Case Management    1/31/2025, 09:23 EST    Education Documentation  No documentation found.        Ronel MARQUIS  Ambulatory Case Management    1/31/2025, 09:23 EST

## 2025-03-06 ENCOUNTER — PATIENT OUTREACH (OUTPATIENT)
Dept: CASE MANAGEMENT | Facility: OTHER | Age: OVER 89
End: 2025-03-06
Payer: MEDICARE

## 2025-03-06 DIAGNOSIS — I10 PRIMARY HYPERTENSION: ICD-10-CM

## 2025-03-06 DIAGNOSIS — I21.9 MYOCARDIAL INFARCTION, UNSPECIFIED MI TYPE, UNSPECIFIED ARTERY: Primary | ICD-10-CM

## 2025-03-06 NOTE — PLAN OF CARE
Problem: Fall Risk  Goal: Prevent Falls and Injury  Outcome: Progressing  Intervention: My Fall Prevention To Do List  Description:   Why is this important?  Most falls happen when it is hard for you to walk safely. Your balance may be off because of an illness. You may have pain in your knees, hip or other joints.  You may be overly tired or taking medicines that make you sleepy. You may not be able to see or hear clearly.  Falls can lead to broken bones, bruises or other injuries.  There are things you can do to help prevent falling.    Flowsheets (Taken 3/6/2025 1556)  My Fall Prevention To Do List:   always use handrails on the stairs   always wear low-heeled or flat shoes or slippers with nonskid soles   keep my cell phone with me always    clutter from the floors   use a nonslip pad with throw rugs, or remove them completely   use a cane or walker  Goal: Optimal Care Coordination of a Patient Experiencing Fall Risk  Intervention: Identify and Manage Contributors to Fall Risk  Flowsheets (Taken 3/6/2025 1553)  Identify and Manage Contributors to Fall Risk:   activities of daily living skills assessed   assistive or adaptive device use encouraged   barriers to physical activity or exercise identified   barriers to safety identified   medication list reviewed

## 2025-03-06 NOTE — OUTREACH NOTE
AMBULATORY CASE MANAGEMENT NOTE    Names and Relationships of Patient/Support Persons: Contact: Emi Ball; Relationship: Self -     CCM Interim Update    I spoke with the patient on the phone. She states overall she is doing very well. She was receiving  PT services but she has been discharged from their services. She feels okay about this.    Patient states she takes one oral medication for her blood pressure and she is compliant with it. She stated she has not been checking her BP. I have encouraged her to do so.    She did state she has been having some mild constipation and she sometimes uses miralax for this. I have encouraged her to increase her water intake. She states she drinks her cup of tea in the morning and does not drink throughout the day. I explained the benefits of increasing her fluid intake.    Patient is agreeable to the echocardiogram being rescheduled. I explained the importance.     Care Coordination    I called [facility] centralized scheduling and scheduled an echocardiogram for patient on 3/13 at 2pm. To arrive at 1:30 on floor 2 of the [place]. I called patient back and informed her of this appointment. She is agreeable.    Ronel MARQUIS  Ambulatory Case Management    3/6/2025, 15:55 EST

## 2025-03-14 RX ORDER — FAMOTIDINE 40 MG/1
40 TABLET, FILM COATED ORAL DAILY
Qty: 90 TABLET | Refills: 1 | Status: SHIPPED | OUTPATIENT
Start: 2025-03-14

## 2025-03-14 RX ORDER — LISINOPRIL 40 MG/1
40 TABLET ORAL DAILY
Qty: 90 TABLET | Refills: 1 | Status: SHIPPED | OUTPATIENT
Start: 2025-03-14

## 2025-03-14 NOTE — TELEPHONE ENCOUNTER
Caller: Emi Ball    Relationship: Self    Best call back number: [phone]     Requested Prescriptions:   Requested Prescriptions     Pending Prescriptions Disp Refills    famotidine (Pepcid) 40 MG tablet 90 tablet 1     Sig: Take 1 tablet by mouth Daily.        Pharmacy where request should be sent: [facility] [id number]  [place], [place] - [address] AT [facility] [address] - [phone] [facility] [phone]      Last office visit with prescribing clinician: 1/8/2025   Last telemedicine visit with prescribing clinician: Visit date not found   Next office visit with prescribing clinician: 4/9/2025     Does the patient have less than a 3 day supply:  [] Yes  [] No    Would you like a call back once the refill request has been completed: [] Yes [] No    If the office needs to give you a call back, can they leave a voicemail: [] Yes [] No    Kal Johnson Rep   03/14/25 14:16 EDT

## 2025-03-17 ENCOUNTER — TELEPHONE (OUTPATIENT)
Dept: INTERNAL MEDICINE | Facility: CLINIC | Age: OVER 89
End: 2025-03-17
Payer: MEDICARE

## 2025-03-17 NOTE — TELEPHONE ENCOUNTER
Spoke with patient and she stated she was needing refill of prevacid, but medication that was sent was pepcid, please advise if ok to send.

## 2025-03-17 NOTE — TELEPHONE ENCOUNTER
Caller: GuilhermeEmi greco    Relationship: Self    Best call back number: [phone]    What is the best time to reach you: ANYTIME    Who are you requesting to speak with (clinical staff, provider,  specific staff member): NURSE     What was the call regarding: PATIENT STATES THAT SHE JUST CAME FROM [facility] AND THEY DID NOT HAVE HER PRESCRIPTIONS AND SHE IS IN NEED OF THEM REALLY BAD,  PLEASE CHECK ON THESE FOR HER.  SYSTEM'S SHOWS THEY WERE SENT ON 3/14.  PLEASE GIVE PATIENT A CALL BACK AND LET HER KNOW WHAT YOU FIND OUT.

## 2025-03-21 ENCOUNTER — TELEPHONE (OUTPATIENT)
Dept: INTERNAL MEDICINE | Facility: CLINIC | Age: OVER 89
End: 2025-03-21
Payer: MEDICARE

## 2025-03-21 DIAGNOSIS — K64.9 HEMORRHOIDS, UNSPECIFIED HEMORRHOID TYPE: Primary | ICD-10-CM

## 2025-03-21 NOTE — TELEPHONE ENCOUNTER
Caller: Emi Ball    Relationship: Self    Best call back number: [phone]     Requested Prescriptions:     PRILOSEC      Requested Prescriptions      No prescriptions requested or ordered in this encounter        Pharmacy where request should be sent: [facility] [id number] - [place] - [address] AT [facility] [address] - [phone] [facility] [phone]      Last office visit with prescribing clinician: 1/8/2025   Last telemedicine visit with prescribing clinician: Visit date not found   Next office visit with prescribing clinician: 4/9/2025     Additional details provided by patient:     Does the patient have less than a 3 day supply:  [x] Yes  [] No    Would you like a call back once the refill request has been completed: [] Yes [] No    If the office needs to give you a call back, can they leave a voicemail: [] Yes [] No    Kal Conte Rep   03/21/25 15:07 EDT

## 2025-03-24 NOTE — TELEPHONE ENCOUNTER
Please call and clarify with patient she is usually quite specific on which stomach pills she does and does not want to take.

## 2025-03-25 NOTE — TELEPHONE ENCOUNTER
Spoke with patient and she stated she is only taking lansoprazole and would like to add the prilosec, patient stated she can barely go to the bathroom because it hurts, pt stated she has hemorrhoids, I ask pt if she was using something for her hemorrhoids and patient stated she does not, please advise

## 2025-03-26 NOTE — TELEPHONE ENCOUNTER
Caller: Emi Ball    Relationship: Self    Best call back number: [phone]    Requested Prescriptions:   Requested Prescriptions     Pending Prescriptions Disp Refills    Hydrocortisone Ace-Pramoxine 1.85-1.15 % cream 60 g 1     Sig: Apply 1 Application topically 2 (Two) Times a Day As Needed (hemorrhoid).        Pharmacy where request should be sent: [facility] [id number]  [place] - [address] AT [facility] [address]  [phone] [facility] [phone] FX     Last office visit with prescribing clinician: 1/8/2025   Last telemedicine visit with prescribing clinician: Visit date not found   Next office visit with prescribing clinician: 4/9/2025     Does the patient have less than a 3 day supply:  [x] Yes  [] No    Would you like a call back once the refill request has been completed: [] Yes [x] No    If the office needs to give you a call back, can they leave a voicemail: [] Yes [x] No    Kal Amador Rep   03/26/25 15:02 EDT

## 2025-03-26 NOTE — TELEPHONE ENCOUNTER
Prilosec is the same as lansoprazole so I do not want her to be on both 1 or the other.  However she is having bad hemorrhoids we can get her in with a colorectal surgeon in town to discuss banding and/or I can send in a steroid cream or suppository for her to use.

## 2025-03-31 ENCOUNTER — PATIENT OUTREACH (OUTPATIENT)
Dept: CASE MANAGEMENT | Facility: OTHER | Age: OVER 89
End: 2025-03-31
Payer: MEDICARE

## 2025-03-31 DIAGNOSIS — I21.9 MYOCARDIAL INFARCTION, UNSPECIFIED MI TYPE, UNSPECIFIED ARTERY: Primary | ICD-10-CM

## 2025-03-31 DIAGNOSIS — I10 PRIMARY HYPERTENSION: ICD-10-CM

## 2025-03-31 NOTE — OUTREACH NOTE
[facility] End of Month Documentation    This Chronic Medical Management Care Plan for Emi Ball, 89 y.o. female, has been monitored and managed; reviewed; revised and a new plan of care implemented for the month of March.  A cumulative time of 29  minutes was spent on this patient record this month, including phone call with patient; electronic communication with primary care provider; chart review; phone call with other providers.    Regarding the patient's problems: has Gastroesophageal reflux; Myocardial infarction; Otalgia; Pain, head; Scoliosis; Vitamin D deficiency; Protein-calorie malnutrition, unspecified severity; Closed fracture of right inferior pubic ramus; Osteopenia; Debility; Difficulty walking; At high risk for falls; Seasonal allergies; and Primary hypertension on their problem list., the following items were addressed: medical records; medications; referrals to community service providers and any changes can be found within the plan section of the note.  A detailed listing of time spent for chronic care management is tracked within each outreach encounter.  Current medications include:  has a current medication list which includes the following prescription(s): aspirin, caltrate 600+d3 soft, carbamide peroxide, co q-10, famotidine, glycerin adult, hydrochlorothiazide, hydrocortisone, hydrocortisone ace-pramoxine, lansoprazole, lisinopril, multiple vitamins-minerals, pediasure grow & gain, fish oil, and vitamin d3. and the patient is reported to be patient is compliant with medication protocol,  Medications are reported to be effective.  Regarding these diagnoses, referrals were made to the following provider(s):  N/A.  All notes on chart for PCP to review.    The patient was monitored remotely for blood pressure; activity level; weight; medications.    The patient's physical needs include:  help taking medications as prescribed; physical healthcare; medication education; needs assistance with ADLs;  hearing care.     The patient's mental support needs include:  not applicable    The patient's cognitive support needs include:  medication; coordination of community providers; needs assistance with ADLs; personal care; health care; caregiver; continued support, Lives in Assisted Living    The patient's psychosocial support needs include:  medication management or adherence; coordination of community providers; continued support    The patient's functional needs include: physical healthcare; needs assistance for ADLs; medication education; hearing care, Made echo appt    The patient's environmental needs include:  not applicable    Care Plan overall comments:  Revised and Ongoing    Refer to previous outreach notes for more information on the areas listed above.    Monthly Billing Diagnoses  (I21.9) Myocardial infarction, unspecified MI type, unspecified artery    (I10) Primary hypertension    Medications   Medications have been reconciled    Care Plan progress this month:      Recently Modified Care Plans Updates made since 2/28/2025 12:00 AM      No recently modified care plans.             Fall Risk       Prevent Falls and Injury (Progressing)       Start:  01/14/25    Expected End:  07/14/25         My Fall Prevention To Do List       Start:  01/14/25         Why is this important?  Most falls happen when it is hard for you to walk safely. Your balance may be off because of an illness. You may have pain in your knees, hip or other joints.  You may be overly tired or taking medicines that make you sleepy. You may not be able to see or hear clearly.  Falls can lead to broken bones, bruises or other injuries.  There are things you can do to help prevent falling.         Initial    My Fall Prevention To Do List: always use handrails on the stairs;always wear low-heeled or flat shoes or slippers with nonskid soles;keep my cell phone with me always; clutter from the floors;use a nonslip pad with throw rugs, or  remove them completely;use a cane or walker taken at 03/06/25            Optimal Care Coordination of a Patient Experiencing Fall Risk (Progressing)       Start:  01/14/25    Expected End:  07/14/25         Identify and Manage Contributors to Fall Risk       Start:  01/14/25          Initial    Identify and Manage Contributors to Fall Risk: activities of daily living skills assessed;assistive or adaptive device use encouraged;barriers to physical activity or exercise identified;barriers to safety identified;medication list reviewed taken at 03/06/25             Current Specialty Plan of Care Status signed by both patient and provider    Instructions   Patient was provided an electronic copy of care plan  CCM services were explained and offered and patient has accepted these services.  Patient has given their written consent to receive CCM services and understands that this includes the authorization of electronic communication of medical information with the other treating providers.  Patient understands that they may stop CCM services at any time and these changes will be effective at the end of the calendar month and will effectively revocate the agreement of CCM services.  Patient understands that only one practitioner can furnish and be paid for CCM services during one calendar month.  Patient also understands that there may be co-payment or deductible fees in association with CCM services.  Patient will continue with at least monthly follow-up calls with the Ambulatory .    Ronel MARQUIS  Ambulatory Case Management    3/31/2025, 16:15 EDT

## 2025-04-09 ENCOUNTER — OFFICE VISIT (OUTPATIENT)
Dept: INTERNAL MEDICINE | Facility: CLINIC | Age: OVER 89
End: 2025-04-09
Payer: MEDICARE

## 2025-04-09 VITALS
RESPIRATION RATE: 14 BRPM | BODY MASS INDEX: 12.03 KG/M2 | SYSTOLIC BLOOD PRESSURE: 138 MMHG | TEMPERATURE: 98.2 F | HEART RATE: 79 BPM | DIASTOLIC BLOOD PRESSURE: 68 MMHG | OXYGEN SATURATION: 98 % | HEIGHT: 62 IN | WEIGHT: 65.4 LBS

## 2025-04-09 DIAGNOSIS — K59.00 CONSTIPATION, UNSPECIFIED CONSTIPATION TYPE: ICD-10-CM

## 2025-04-09 DIAGNOSIS — M41.20 OTHER IDIOPATHIC SCOLIOSIS, UNSPECIFIED SPINAL REGION: Primary | ICD-10-CM

## 2025-04-09 DIAGNOSIS — I10 PRIMARY HYPERTENSION: ICD-10-CM

## 2025-04-09 DIAGNOSIS — K64.9 HEMORRHOIDS, UNSPECIFIED HEMORRHOID TYPE: ICD-10-CM

## 2025-04-09 DIAGNOSIS — E46 PROTEIN-CALORIE MALNUTRITION, UNSPECIFIED SEVERITY: ICD-10-CM

## 2025-04-09 DIAGNOSIS — K21.9 GASTROESOPHAGEAL REFLUX DISEASE WITHOUT ESOPHAGITIS: ICD-10-CM

## 2025-04-09 RX ORDER — OMEGA-3S/DHA/EPA/FISH OIL 1000-1400
2 CAPSULE,DELAYED RELEASE (ENTERIC COATED) ORAL
Qty: 90 TABLET | Refills: 1 | Status: SHIPPED | OUTPATIENT
Start: 2025-04-09

## 2025-04-09 NOTE — ASSESSMENT & PLAN NOTE
Orders:    Ambulatory Referral to Chronic Care Management Care Coordination    Ambulatory Referral to Neurosurgery

## 2025-04-09 NOTE — PROGRESS NOTES
"Chief Complaint  Constipation, Difficulty with walking, Hypertension, and Hemorrhoids      Subjective      History of Present Illness  The patient presents for evaluation of constipation, acid reflux, hypertension, hemorrhoids, and scoliosis.    She reports persistent fatigue despite improved eating habits and financial constraints preventing her from securing an apartment. She consumes PediaSure daily .    She describes bowel movements resembling small ventura, with slight improvement but continued difficulty during defecation. No blood in stool. Uses MiraLAX for severe symptoms and maintains adequate hydration.    Worsening acid reflux since discontinuing lansoprazole. Has not tried Pepcid.    Currently on lisinopril for hypertension with no adverse effects.    No improvement in hemorrhoids, requests topical cream. Appointment with Dr. Nix on 04/22/2025 at 11:30 AM.    Progressive worsening of hip condition, exacerbated by standing and sitting. Considering wheelchair use. She is interested in working with a specialist again. Finds winsome chi beneficial.          Objective   Vital Signs:   Vitals:    04/09/25 1414   BP: 138/68   BP Location: Left arm   Patient Position: Sitting   Cuff Size: Pediatric   Pulse: 79   Resp: 14   Temp: 98.2 °F (36.8 °C)   TempSrc: Temporal   SpO2: 98%   Weight: 29.7 kg (65 lb 6.4 oz)   Height: 157.5 cm (62.01\")     Body mass index is 11.96 kg/m².    Wt Readings from Last 3 Encounters:   04/09/25 29.7 kg (65 lb 6.4 oz)   01/08/25 34.1 kg (75 lb 3.2 oz)   11/25/24 27.2 kg (60 lb)     BP Readings from Last 3 Encounters:   04/09/25 138/68   01/08/25 130/78   11/25/24 178/84       Health Maintenance   Topic Date Due    DXA SCAN  Never done    Pneumococcal Vaccine 50+ (1 of 1 - PCV) Never done    ZOSTER VACCINE (1 of 2) Never done    TDAP/TD VACCINES (2 - Tdap) 07/24/2015    COVID-19 Vaccine (4 - 2024-25 season) 09/01/2024    ANNUAL WELLNESS VISIT  05/24/2025    INFLUENZA VACCINE  07/01/2025 "    RSV Vaccine - Adults  Completed       Physical Exam  Vitals reviewed.   Constitutional:       Appearance: Normal appearance. She is well-developed.   HENT:      Head: Normocephalic and atraumatic.      Right Ear: External ear normal.      Left Ear: External ear normal.   Eyes:      Conjunctiva/sclera: Conjunctivae normal.      Pupils: Pupils are equal, round, and reactive to light.   Cardiovascular:      Rate and Rhythm: Normal rate and regular rhythm.      Heart sounds: No murmur heard.     No friction rub. No gallop.   Pulmonary:      Effort: Pulmonary effort is normal.      Breath sounds: Normal breath sounds. No wheezing or rhonchi.   Musculoskeletal:      Comments: Severe scoliosis.  Uses walker for ambulation.   Skin:     General: Skin is warm and dry.   Neurological:      Mental Status: She is alert and oriented to person, place, and time.   Psychiatric:         Mood and Affect: Affect normal.         Behavior: Behavior normal.         Thought Content: Thought content normal.        Physical Exam        Result Review :  The following data was reviewed by: Yoselyn Godfrey MD on 04/09/2025:         Results             Procedures            Assessment & Plan  Other idiopathic scoliosis, unspecified spinal region    Orders:    Ambulatory Referral to Chronic Care Management Care Coordination    Ambulatory Referral to Neurosurgery    Protein-calorie malnutrition, unspecified severity           Primary hypertension           Gastroesophageal reflux disease without esophagitis         Constipation, unspecified constipation type         Hemorrhoids, unspecified hemorrhoid type              Assessment & Plan  Constipation  - Advised lower dose of MiraLAX every other day so she can take it more consistently  - Emphasized adequate hydration (3-4 small bottles daily)  - Suggested fiber gummies with caution about cramping if hydration is insufficient  - Recommended daily fiber gummies if hydration is maintained  -  Discussed MiraLAX use if necessary    Acid reflux  - Advised to try Pepcid first  - [facility] ineffective[facility]    Hypertension  - Blood pressure well-managed with lisinopril  - Continue current regimen    Hemorrhoids  - Likely exacerbated by constipation  - Provided prescription for topical cream  - Referred to Dr. Nix for potential banding    Scoliosis  - Encouraged to continue winsome chi exercises  - Referral to back specialist for further evaluation and management  -Discussed she may not be a surgical candidate however see if they have any other options such as specialized therapy programs and/or bracing    Weight management  - Weight decreased slightly since January 2025 but improved compared to November 2024  - Advised to increase PediaSure intake to twice daily to aid in weight gain    Patient or patient representative verbalized consent for the use of Ambient Listening during the visit with  Yoselyn Godfrey MD for chart documentation. 4/9/2025  15:19 EDT      FOLLOW UP  Return in about 3 months (around 7/9/2025).  Patient was given instructions and counseling regarding her condition or for health maintenance advice. Please see specific information pulled into the AVS if appropriate.     Yoselyn Godfrey MD  04/09/25  15:20 EDT    CURRENT & DISCONTINUED MEDICATIONS  Current Outpatient Medications   Medication Instructions    aspirin 81 mg, 3 Times Weekly    Calcium Carb-Cholecalciferol (Caltrate 600+D3 Soft) 600-800 MG-UNIT chewable tablet No dose, route, or frequency recorded.    Coenzyme Q10 (Co Q-10) 100 MG capsule No dose, route, or frequency recorded.    famotidine (PEPCID) 40 mg, Oral, Daily    Fiber Adult Gummies 2 g, Oral, Every Night at Bedtime    fish oil 1,000 mg, Daily With Breakfast    glycerin adult 2 g suppository 1 suppository, Rectal, As Needed    hydrocortisone 2.5 % cream 1 Application, Topical, 2 Times Daily    Hydrocortisone Ace-Pramoxine 1.85-1.15  % cream 1 Application, Apply externally, 2 Times Daily PRN    lisinopril (PRINIVIL,ZESTRIL) 40 mg, Oral, Daily    Multiple Vitamins-Minerals (MULTIVITAMIN ADULT EXTRA C PO) multivitamin Oral Tablet take 1 tablet by oral route once daily with food   Active    Nutritional Supplements (PediaSure Grow & Gain) liquid 1 each, Oral, 2 Times Daily    Nutritional Supplements (PediaSure Grow & Gain) liquid 1 bottle, Oral, Daily    vitamin D3 5,000 Units, Daily       Medications Discontinued During This Encounter   Medication Reason    carbamide peroxide (Debrox) 6.5 % otic solution     hydroCHLOROthiazide 12.5 MG tablet     lansoprazole (PREVACID SOLUTAB) 15 MG Tablet Delayed Release Dispersible disintegrating tablet     Hydrocortisone Ace-Pramoxine 1.85-1.15 % cream Reorder

## 2025-04-10 ENCOUNTER — TELEPHONE (OUTPATIENT)
Dept: INTERNAL MEDICINE | Facility: CLINIC | Age: OVER 89
End: 2025-04-10
Payer: MEDICARE

## 2025-04-10 NOTE — TELEPHONE ENCOUNTER
Ask her if she could either put it in applesauce or crush it and then mix it with something.  If she cannot we can switch back to the other this 1 is just better for bone health that is why I wanted to try it.

## 2025-04-10 NOTE — TELEPHONE ENCOUNTER
Pt called into office today stating she is having a hard time swallowing the pepcid, pt is requesting to be put back on the Prevacid.

## 2025-04-11 NOTE — TELEPHONE ENCOUNTER
Caller: Emi Ball    Relationship: Self    Best call back number: [phone]     What is the best time to reach you: ANY    Who are you requesting to speak with (clinical staff, provider,  specific staff member): CLINICAL STAFF    What was the call regarding: PATIENT CALLED BACK STATING THAT SHE WOULD LIKE TO HAVE HER MEDICATION SWITCHED BACK TO THE PREVACID    PHARMACY: [facility] [id number] - [place] - [address] AT [facility] [address] - [phone] [facility] [phone]  [phone]

## 2025-04-14 NOTE — TELEPHONE ENCOUNTER
See string below.  Did she try crushing and still can't take and now wants other medicine again?  If so ok to fill.

## 2025-04-16 ENCOUNTER — PATIENT OUTREACH (OUTPATIENT)
Dept: CASE MANAGEMENT | Facility: OTHER | Age: OVER 89
End: 2025-04-16
Payer: MEDICARE

## 2025-04-16 ENCOUNTER — TELEPHONE (OUTPATIENT)
Dept: CASE MANAGEMENT | Facility: OTHER | Age: OVER 89
End: 2025-04-16

## 2025-04-16 DIAGNOSIS — M41.20 OTHER IDIOPATHIC SCOLIOSIS, UNSPECIFIED SPINAL REGION: Primary | ICD-10-CM

## 2025-04-16 DIAGNOSIS — E46 PROTEIN-CALORIE MALNUTRITION, UNSPECIFIED SEVERITY: ICD-10-CM

## 2025-04-16 DIAGNOSIS — I21.9 MYOCARDIAL INFARCTION, UNSPECIFIED MI TYPE, UNSPECIFIED ARTERY: Primary | ICD-10-CM

## 2025-04-16 NOTE — PLAN OF CARE
Problem: Fall Risk  Goal: Prevent Falls and Injury  Outcome: Progressing  Intervention: My Fall Prevention To Do List  Description:   Why is this important?  Most falls happen when it is hard for you to walk safely. Your balance may be off because of an illness. You may have pain in your knees, hip or other joints.  You may be overly tired or taking medicines that make you sleepy. You may not be able to see or hear clearly.  Falls can lead to broken bones, bruises or other injuries.  There are things you can do to help prevent falling.    Flowsheets (Taken 4/16/2025 0926)  My Fall Prevention To Do List:   always use handrails on the stairs   always wear low-heeled or flat shoes or slippers with nonskid soles   keep my cell phone with me always    clutter from the floors   use a nonslip pad with throw rugs, or remove them completely   use a cane or walker  Goal: Optimal Care Coordination of a Patient Experiencing Fall Risk  Outcome: Progressing  Intervention: Identify and Manage Contributors to Fall Risk  Flowsheets (Taken 4/16/2025 0926)  Identify and Manage Contributors to Fall Risk:   activities of daily living skills assessed   assistive or adaptive device use encouraged   cognition assessed   barriers to safety identified   barriers to physical activity or exercise identified   medication list reviewed

## 2025-04-16 NOTE — OUTREACH NOTE
AMBULATORY CASE MANAGEMENT NOTE    Names and Relationships of Patient/Support Persons: Contact: Emi Ball; Relationship: Self -     CCM Interim Update    I spoke with the patient on the phone today. She was seen by PCP on 4/9 with complaints of constipation. Patient is still only taking Miralax as needed but still complains of constipation daily. I explained to patient the importance of taking the Miralax consistently when still having constipation. We also discussed increasing fluid intake. Patient agrees to drink 2-3 bottles of water plus her tea and her 2 nutritional shakes daily.    Patient has appointment set up with General Surgery for hemorrhoid removal discussion. She has not been contacted from neurosurgery yet.    Patient has medications still at the pharmacy from 4/9. I have reminded her about these. She states she will have the assisted living facility take her to get those today. She needs to start prevacid and fiber gummies.    Patient is requesting a power wheelchair. I told her I will get an order started by [facility] and they will determine if insurance will cover it. She is agreeable to this.    Care Coordination    Electronic communication sent to PCP regarding PWC.[facility] order form filled out for PCP to sign.    Demographics and Insurance card faxed to [facility] ([phone])for PWC order.      Education Documentation  Safety, taught by Ronel Carlton RN at 4/16/2025  9:30 AM.  Learner: Patient  Readiness: Acceptance  Method: Explanation  Response: Verbalizes Understanding, Needs Reinforcement    Medication Management, taught by Ronel Carlton RN at 4/16/2025  9:30 AM.  Learner: Patient  Readiness: Acceptance  Method: Explanation  Response: Verbalizes Understanding, Needs Reinforcement    Home Safety, taught by Ronel Carlton, RN at 4/16/2025  9:30 AM.  Learner: Patient  Readiness: Acceptance  Method: Explanation  Response: Verbalizes Understanding, Needs  Reinforcement    Activity, taught by Ronel Carlton, RN at 4/16/2025  9:30 AM.  Learner: Patient  Readiness: Acceptance  Method: Explanation  Response: Verbalizes Understanding, Needs Reinforcement          Ronel MARQUIS  Ambulatory Case Management    4/16/2025, 09:30 EDT

## 2025-04-16 NOTE — OUTREACH NOTE
AMBULATORY CASE MANAGEMENT NOTE    Names and Relationships of Patient/Support Persons: Contact: Emi Ball; Relationship: Self -     Care Coordination    Signed order for eval for PWC scanned into chart then faxed to [facility] to the attention of Cathy Hartley.    Education Documentation  Safety, taught by Ronel Carlton, RN at 4/16/2025  9:30 AM.  Learner: Patient  Readiness: Acceptance  Method: Explanation  Response: Verbalizes Understanding, Needs Reinforcement    Medication Management, taught by Ronel Carlton, RN at 4/16/2025  9:30 AM.  Learner: Patient  Readiness: Acceptance  Method: Explanation  Response: Verbalizes Understanding, Needs Reinforcement    Home Safety, taught by Ronel Carlton, RN at 4/16/2025  9:30 AM.  Learner: Patient  Readiness: Acceptance  Method: Explanation  Response: Verbalizes Understanding, Needs Reinforcement    Activity, taught by Ronel Carlton, RN at 4/16/2025  9:30 AM.  Learner: Patient  Readiness: Acceptance  Method: Explanation  Response: Verbalizes Understanding, Needs Reinforcement          Ronel MARQUIS  Ambulatory Case Management    4/16/2025, 14:05 EDT

## 2025-04-16 NOTE — TELEPHONE ENCOUNTER
Dr. Godfrey~    I spoke with the patient on the phone today. She is requesting an order for a power wheelchair. I have pended the order and am supplying the verbiage for the office notes. Please add to your notes, if agreeable.    For a Power Wheelchair: The patient's mobility limitations impair her ability to perform mobility ADL's in the home, her mobility limitations cannot be resolved sufficiently by the use of a cane or walker and the patient does not have sufficient upper or lower extremity function to self-propel a manual wheelchair.    Thank you,  Ronel

## 2025-04-29 ENCOUNTER — PATIENT OUTREACH (OUTPATIENT)
Dept: CASE MANAGEMENT | Facility: OTHER | Age: OVER 89
End: 2025-04-29
Payer: MEDICARE

## 2025-04-29 ENCOUNTER — TELEPHONE (OUTPATIENT)
Dept: INTERNAL MEDICINE | Facility: CLINIC | Age: OVER 89
End: 2025-04-29
Payer: MEDICARE

## 2025-04-29 DIAGNOSIS — R26.2 DIFFICULTY WALKING: ICD-10-CM

## 2025-04-29 DIAGNOSIS — M41.20 OTHER IDIOPATHIC SCOLIOSIS, UNSPECIFIED SPINAL REGION: ICD-10-CM

## 2025-04-29 DIAGNOSIS — I10 PRIMARY HYPERTENSION: ICD-10-CM

## 2025-04-29 DIAGNOSIS — I21.9 MYOCARDIAL INFARCTION, UNSPECIFIED MI TYPE, UNSPECIFIED ARTERY: Primary | ICD-10-CM

## 2025-04-29 DIAGNOSIS — K21.9 GASTROESOPHAGEAL REFLUX DISEASE WITHOUT ESOPHAGITIS: ICD-10-CM

## 2025-04-29 NOTE — OUTREACH NOTE
[facility] End of Month Documentation    This Chronic Medical Management Care Plan for Emi Ball, 89 y.o. female, has been reviewed; revised and a new plan of care implemented for the month of April.  A cumulative time of 42  minutes was spent on this patient record this month, including phone call with patient; electronic communication with primary care provider; chart review; phone call with other providers, talking with patient, medication reconciliation, speaking with Cathy Hartley with [facility].    Regarding the patient's problems: has Gastroesophageal reflux; Myocardial infarction; Otalgia; Pain, head; Scoliosis; Vitamin D deficiency; Protein-calorie malnutrition, unspecified severity; Closed fracture of right inferior pubic ramus; Osteopenia; Debility; Difficulty walking; At high risk for falls; Seasonal allergies; and Primary hypertension on their problem list., the following items were addressed: medical records; medications; changes to medical care; referrals to community service providers, Requesting a PWC and any changes can be found within the plan section of the note.  A detailed listing of time spent for chronic care management is tracked within each outreach encounter.  Current medications include:  has a current medication list which includes the following prescription(s): aspirin, caltrate 600+d3 soft, co q-10, famotidine, fiber adult gummies, glycerin adult, hydrocortisone, hydrocortisone ace-pramoxine, lansoprazole, lisinopril, multiple vitamins-minerals, pediasure grow & gain, pediasure grow & gain, fish oil, and vitamin d3. and the patient is reported to be patient is noncompliant with medication protocol, has not picked up her mesications from 4/9,  Medications are reported to be non-effective in controlling symptoms and changes have been made to the medication protocol, constipation.  Regarding these diagnoses, referrals were made to the following provider(s):  Neurosurgery, General  Surgery, [facility].  All notes on chart for PCP to review.    The patient was monitored remotely for blood pressure; activity level; weight; medications.    The patient's physical needs include:  help taking medications as prescribed; physical healthcare; medication education; needs assistance with ADLs; hearing care; DME supplies; resources for disability needs; physician referral, Wants a PWC.     The patient's mental support needs include:  not applicable    The patient's cognitive support needs include:  medication; coordination of community providers; needs assistance with ADLs; personal care; health care; caregiver; increased support, Lives in Assisted Living    The patient's psychosocial support needs include:  medication management or adherence; coordination of community providers; need for increased support    The patient's functional needs include: physical healthcare; needs assistance for ADLs; medication education; hearing care; DME; physician referral, Neurosurgery, Gen Surgery, [facility]    The patient's environmental needs include:  resources for disability needs    Care Plan overall comments:  Revised and Ongoing    Refer to previous outreach notes for more information on the areas listed above.    Monthly Billing Diagnoses  (I21.9) Myocardial infarction, unspecified MI type, unspecified artery    (K21.9) Gastroesophageal reflux disease without esophagitis    (I10) Primary hypertension    (R26.2) Difficulty walking    (M41.20) Other idiopathic scoliosis, unspecified spinal region    Medications   Medications have been reconciled    Care Plan progress this month:      Recently Modified Care Plans Updates made since 3/29/2025 12:00 AM      No recently modified care plans.             Fall Risk       Prevent Falls and Injury (Progressing)       Start:  01/14/25    Expected End:  07/14/25         My Fall Prevention To Do List       Start:  01/14/25         Why is this important?  Most falls  happen when it is hard for you to walk safely. Your balance may be off because of an illness. You may have pain in your knees, hip or other joints.  You may be overly tired or taking medicines that make you sleepy. You may not be able to see or hear clearly.  Falls can lead to broken bones, bruises or other injuries.  There are things you can do to help prevent falling.         Initial Last    My Fall Prevention To Do List: always use handrails on the stairs;always wear low-heeled or flat shoes or slippers with nonskid soles;keep my cell phone with me always; clutter from the floors;use a nonslip pad with throw rugs, or remove them completely;use a cane or walker taken at 03/06/25 always use handrails on the stairs;always wear low-heeled or flat shoes or slippers with nonskid soles;keep my cell phone with me always; clutter from the floors;use a nonslip pad with throw rugs, or remove them completely;use a cane or walker taken at 04/16/25            Optimal Care Coordination of a Patient Experiencing Fall Risk (Progressing)       Start:  01/14/25    Expected End:  07/14/25         Identify and Manage Contributors to Fall Risk       Start:  01/14/25          Initial Last    Identify and Manage Contributors to Fall Risk: activities of daily living skills assessed;assistive or adaptive device use encouraged;barriers to physical activity or exercise identified;barriers to safety identified;medication list reviewed taken at 03/06/25 activities of daily living skills assessed;assistive or adaptive device use encouraged;cognition assessed;barriers to safety identified;barriers to physical activity or exercise identified;medication list reviewed taken at 04/16/25             Current Specialty Plan of Care Status signed by both patient and provider    Instructions   Patient was provided an electronic copy of care plan  CCM services were explained and offered and patient has accepted these services.  Patient has  given their written consent to receive CCM services and understands that this includes the authorization of electronic communication of medical information with the other treating providers.  Patient understands that they may stop CCM services at any time and these changes will be effective at the end of the calendar month and will effectively revocate the agreement of CCM services.  Patient understands that only one practitioner can furnish and be paid for CCM services during one calendar month.  Patient also understands that there may be co-payment or deductible fees in association with CCM services.  Patient will continue with at least monthly follow-up calls with the Ambulatory .    Ronel MARQUIS  Ambulatory Case Management    4/29/2025, 10:49 EDT

## 2025-04-29 NOTE — TELEPHONE ENCOUNTER
Hub staff attempted to follow warm transfer process and was unsuccessful     Caller: Emi Ball    Relationship to patient: Self    Best call back number: [phone]     Patient is needing: PATIENT WOULD LIKE A CALL BACK FROM THE NURSE. PATIENT WAS UNCLEAR ON WHAT THE MATTER CONCERNED

## 2025-04-30 ENCOUNTER — OFFICE VISIT (OUTPATIENT)
Dept: SURGERY | Facility: CLINIC | Age: OVER 89
End: 2025-04-30
Payer: MEDICARE

## 2025-04-30 VITALS — HEIGHT: 62 IN | BODY MASS INDEX: 11.96 KG/M2 | WEIGHT: 65 LBS

## 2025-04-30 DIAGNOSIS — K59.09 OTHER CONSTIPATION: Primary | ICD-10-CM

## 2025-04-30 NOTE — LETTER
May 1, 2025     Yoselyn Godfrey MD  [address]  [place] [address]    Patient: Emi Ball   YOB: 1935   Date of Visit: 4/30/2025     Dear Yoselyn Godfrey MD:       Thank you for referring Emi Ball to me for evaluation. Below are the relevant portions of my assessment and plan of care.    If you have questions, please do not hesitate to call me. I look forward to following Emi along with you.         Sincerely,        Ministerio Nix MD        CC: No Recipients    Ministerio Nix MD  05/01/25 1004  Sign when Signing Visit  General Surgery/Colorectal Surgery Note    Patient Name:  Emi Ball  YOB: 1935  [id number]    Referring Provider: Yoselyn Godfrey MD      Patient Care Team:  Yoselyn Godfrey MD as PCP - General (Internal Medicine)  Ronel Carlton RN as Ambulatory  ([facility] [place])    Chief complaint hemorrhoids    Subjective.     History of present illness:    She comes in for evaluation of symptomatic hemorrhoids.  She has a history of incomplete evacuation sensation.  No pain with bowel movements.  No blood or mucus per rectum.  She denies prolapse.  No family history of colorectal cancer.  No blood thinner use.  No fiber use.  No history of colonoscopy.  She says that her bowel movements are small hard pellets.  She admits to being noncompliant with her medications.      History:  Past Medical History:   Diagnosis Date   • Gastroesophageal reflux    • GERD (gastroesophageal reflux disease)    • Myocardial infarction    • Otalgia    • Pain head    • Scoliosis 05/04/2020   • Vitamin D deficiency        History reviewed. No pertinent surgical history.    Family History   Problem Relation Age of Onset   • Heart attack Other        Social History     Tobacco Use   • Smoking status: Never     Passive exposure: Never   • Smokeless tobacco: Never   Vaping Use   • Vaping status: Never Used   Substance Use Topics   •  Alcohol use: Not Currently     Comment: social   • Drug use: Never       Review of Systems  All systems were reviewed and negative except for:   Review of Systems   Constitutional: Negative for chills, fever and unexpected weight loss.   HENT: Negative for congestion, nosebleeds and voice change.    Eyes: Negative for blurred vision, double vision and discharge.   Respiratory: Negative for apnea, chest tightness and shortness of breath.    Cardiovascular: Negative for chest pain and leg swelling.   Gastrointestinal:        See HPI   Endocrine: Negative for cold intolerance and heat intolerance.   Genitourinary: Negative for dysuria, hematuria and urgency.   Musculoskeletal: Negative for back pain, joint swelling and neck pain.   Skin: Negative for color change and dry skin.   Neurological: Negative for dizziness and confusion.   Hematological: Negative for adenopathy.   Psychiatric/Behavioral: Negative for agitation and behavioral problems.     MEDS:  Prior to Admission medications    Medication Sig Start Date End Date Taking? Authorizing Provider   aspirin 81 MG EC tablet Take 1 tablet by mouth 3 (Three) Times a Week.   Yes ProviderIno MD   Calcium Carb-Cholecalciferol (Caltrate 600+D3 Soft) 600-800 MG-UNIT chewable tablet    Yes ProviderIno MD   Coenzyme Q10 (Co Q-10) 100 MG capsule    Yes ProviderIno MD   famotidine (Pepcid) 40 MG tablet Take 1 tablet by mouth Daily. 3/14/25  Yes Yoselyn Godfrey MD   Fiber Adult Gummies 2 g chewable tablet Chew 2 g every night at bedtime. 4/9/25  Yes Yoselyn Godfrey MD   glycerin adult 2 g suppository Insert 1 suppository into the rectum As Needed for Constipation. 11/25/24  Yes Yoselyn Godfrey MD   hydrocortisone 2.5 % cream Apply 1 Application topically to the appropriate area as directed 2 (Two) Times a Day. 1/22/25  Yes Yoselyn Godfrey MD   Hydrocortisone Ace-Pramoxine 1.85-1.15 % cream Apply 1 Application topically 2 (Two) Times  a Day As Needed (hemorrhoid). 4/9/25  Yes Yoselyn Godfrey MD   lansoprazole (PREVACID SOLUTAB) 15 MG Tablet Delayed Release Dispersible disintegrating tablet Take 1 tablet by mouth Daily. 4/16/25  Yes Yoselyn Godfrey MD   lisinopril (PRINIVIL,ZESTRIL) 40 MG tablet TAKE 1 TABLET BY MOUTH DAILY 3/14/25  Yes Yoselyn Godfrey MD   Multiple Vitamins-Minerals (MULTIVITAMIN ADULT EXTRA C PO) multivitamin Oral Tablet take 1 tablet by oral route once daily with food   Active   Yes Ino Jimenez MD   Nutritional Supplements (PediaSure Grow & Gain) liquid Take 1 bottle by mouth Daily. 4/9/25  Yes Yoselyn Godfrey MD   Omega-3 Fatty Acids (fish oil) 1000 MG capsule capsule Take 1 capsule by mouth Daily With Breakfast.   Yes Ino Jimenez MD   vitamin D3 125 MCG (5000 UT) capsule capsule Take 1 capsule by mouth Daily.   Yes Ino Jimenez MD   Nutritional Supplements (PediaSure Grow & Gain) liquid Take 1 each by mouth 2 (Two) Times a Day for 30 days. 1/8/25 2/7/25  Yoselyn Godfrey MD   psyllium (METAMUCIL) 58.6 % powder Take 1 packet by mouth Daily. 4/30/25 4/30/26  Ministerio Nix MD        Allergies:  Iodinated contrast media, Iodine, and Strawberry    Objective    Vital Signs        Physical Exam:     General Appearance:    Alert, cooperative, in no acute distress   Head:    Normocephalic, without obvious abnormality, atraumatic   Eyes:          Conjunctivae and sclerae normal, no icterus,     Ears:    Ears appear intact with no abnormalities noted   Throat:   No oral lesions, no thrush, oral mucosa moist   Neck:   No adenopathy, supple, trachea midline, no thyromegaly   Back:     No kyphosis present, no scoliosis present, no skin lesions,      erythema or scars, no tenderness to percussion or                   palpation,   range of motion normal   Lungs:     Clear to auscultation,respirations regular, even and                  unlabored    Heart:    Regular rhythm and normal  "rate, normal S1 and S2, no            murmur, no gallop, no rub, no click   Chest Wall:    No abnormalities observed   Abdomen:     Normal bowel sounds, no masses, no organomegaly, soft        non-tender, non-distended, no guarding, no rebound                tenderness   Rectal:        Extremities:   Moves all extremities well, no edema, no cyanosis, no             redness   Pulses:   Pulses palpable and equal bilaterally   Skin:   No bleeding, bruising or rash   Lymph nodes:   No palpable adenopathy   Neurologic:   A/o x 4 with no deficits       Results Review:   {Results Review:[id number]::\"I reviewed the patient's new clinical results.\"    LABS/IMAGING:  Results for orders placed or performed in visit on 09/27/24   Basic metabolic panel    Collection Time: 09/27/24 11:40 AM    Specimen: Blood   Result Value Ref Range    Glucose 84 65 - 99 mg/dL    BUN 22 8 - 23 mg/dL    Creatinine 0.63 0.57 - 1.00 mg/dL    Sodium 141 136 - 145 mmol/L    Potassium 4.5 3.5 - 5.2 mmol/L    Chloride 101 98 - 107 mmol/L    CO2 31.0 (H) 22.0 - 29.0 mmol/L    Calcium 9.6 8.6 - 10.5 mg/dL    BUN/Creatinine Ratio 34.9 (H) 7.0 - 25.0    Anion Gap 9.0 5.0 - 15.0 mmol/L    eGFR 85.4 >60.0 mL/min/1.73        Result Review: Labs  Result Review  Imaging  Med Tab  Media     Assessment & Plan    Incomplete evacuation, symptomatic hemorrhoids, constipation    Discussion with patient.  I counseled her on the importance of increasing fiber in her diet, using over-the-counter fiber supplement, increasing water in her diet.  I offered her flexible sigmoidoscopy for further evaluation with rubber band ligation of her hemorrhoids.  She declined.  Follow-up with me or GI in 2 to 3 weeks if no better.  All questions answered.  She agrees with the plan.  Thank you for the consult.             This document has been electronically signed by Ministerio Nix MD  May 1, 2025 10:02 EDT  "

## 2025-05-01 NOTE — TELEPHONE ENCOUNTER
Called and spoke with pt, pt stated she went and saw gen surg yesterday and everything is good, pt is no longer needing anything at this time.

## 2025-05-01 NOTE — PROGRESS NOTES
General Surgery/Colorectal Surgery Note    Patient Name:  Emi Ball  YOB: 1935  [id number]    Referring Provider: Yoselyn Godfrey MD      Patient Care Team:  Yoselyn Godfrey MD as PCP - General (Internal Medicine)  Ronel Carlton RN as Ambulatory  ([facility] [place])    Chief complaint hemorrhoids    Subjective .     History of present illness:    She comes in for evaluation of symptomatic hemorrhoids.  She has a history of incomplete evacuation sensation.  No pain with bowel movements.  No blood or mucus per rectum.  She denies prolapse.  No family history of colorectal cancer.  No blood thinner use.  No fiber use.  No history of colonoscopy.  She says that her bowel movements are small hard pellets.  She admits to being noncompliant with her medications.      History:  Past Medical History:   Diagnosis Date    Gastroesophageal reflux     GERD (gastroesophageal reflux disease)     Myocardial infarction     Otalgia     Pain head     Scoliosis 05/04/2020    Vitamin D deficiency        History reviewed. No pertinent surgical history.    Family History   Problem Relation Age of Onset    Heart attack Other        Social History     Tobacco Use    Smoking status: Never     Passive exposure: Never    Smokeless tobacco: Never   Vaping Use    Vaping status: Never Used   Substance Use Topics    Alcohol use: Not Currently     Comment: social    Drug use: Never       Review of Systems  All systems were reviewed and negative except for:   Review of Systems   Constitutional: Negative for chills, fever and unexpected weight loss.   HENT: Negative for congestion, nosebleeds and voice change.    Eyes: Negative for blurred vision, double vision and discharge.   Respiratory: Negative for apnea, chest tightness and shortness of breath.    Cardiovascular: Negative for chest pain and leg swelling.   Gastrointestinal:        See HPI   Endocrine: Negative for cold intolerance and heat  intolerance.   Genitourinary: Negative for dysuria, hematuria and urgency.   Musculoskeletal: Negative for back pain, joint swelling and neck pain.   Skin: Negative for color change and dry skin.   Neurological: Negative for dizziness and confusion.   Hematological: Negative for adenopathy.   Psychiatric/Behavioral: Negative for agitation and behavioral problems.     MEDS:  Prior to Admission medications    Medication Sig Start Date End Date Taking? Authorizing Provider   aspirin 81 MG EC tablet Take 1 tablet by mouth 3 (Three) Times a Week.   Yes Ino Jimenez MD   Calcium Carb-Cholecalciferol (Caltrate 600+D3 Soft) 600-800 MG-UNIT chewable tablet    Yes Ino Jimenez MD   Coenzyme Q10 (Co Q-10) 100 MG capsule    Yes Ino Jimenez MD   famotidine (Pepcid) 40 MG tablet Take 1 tablet by mouth Daily. 3/14/25  Yes Yoselyn Godfrey MD   Fiber Adult Gummies 2 g chewable tablet Chew 2 g every night at bedtime. 4/9/25  Yes Yoselyn Godfrey MD   glycerin adult 2 g suppository Insert 1 suppository into the rectum As Needed for Constipation. 11/25/24  Yes Yoselyn Godfrey MD   hydrocortisone 2.5 % cream Apply 1 Application topically to the appropriate area as directed 2 (Two) Times a Day. 1/22/25  Yes Yoselyn Godfrey MD   Hydrocortisone Ace-Pramoxine 1.85-1.15 % cream Apply 1 Application topically 2 (Two) Times a Day As Needed (hemorrhoid). 4/9/25  Yes Yoselyn Godfrey MD   lansoprazole (PREVACID SOLUTAB) 15 MG Tablet Delayed Release Dispersible disintegrating tablet Take 1 tablet by mouth Daily. 4/16/25  Yes Yoselyn Godfrey MD   lisinopril (PRINIVIL,ZESTRIL) 40 MG tablet TAKE 1 TABLET BY MOUTH DAILY 3/14/25  Yes Yoselyn Godfrey MD   Multiple Vitamins-Minerals (MULTIVITAMIN ADULT EXTRA C PO) multivitamin Oral Tablet take 1 tablet by oral route once daily with food   Active   Yes Ino Jimenez MD   Nutritional Supplements (PediaSure Grow & Gain) liquid Take 1 bottle by  mouth Daily. 4/9/25  Yes Yoselyn Godfrey MD   Omega-3 Fatty Acids (fish oil) 1000 MG capsule capsule Take 1 capsule by mouth Daily With Breakfast.   Yes ProviderIno MD   vitamin D3 125 MCG (5000 UT) capsule capsule Take 1 capsule by mouth Daily.   Yes ProviderIno MD   Nutritional Supplements (PediaSure Grow & Gain) liquid Take 1 each by mouth 2 (Two) Times a Day for 30 days. 1/8/25 2/7/25  Yoselyn Godfrey MD   psyllium (METAMUCIL) 58.6 % powder Take 1 packet by mouth Daily. 4/30/25 4/30/26  Ministerio Nix MD        Allergies:  Iodinated contrast media, Iodine, and Strawberry    Objective     Vital Signs        Physical Exam:     General Appearance:    Alert, cooperative, in no acute distress   Head:    Normocephalic, without obvious abnormality, atraumatic   Eyes:          Conjunctivae and sclerae normal, no icterus,     Ears:    Ears appear intact with no abnormalities noted   Throat:   No oral lesions, no thrush, oral mucosa moist   Neck:   No adenopathy, supple, trachea midline, no thyromegaly   Back:     No kyphosis present, no scoliosis present, no skin lesions,      erythema or scars, no tenderness to percussion or                   palpation,   range of motion normal   Lungs:     Clear to auscultation,respirations regular, even and                  unlabored    Heart:    Regular rhythm and normal rate, normal S1 and S2, no            murmur, no gallop, no rub, no click   Chest Wall:    No abnormalities observed   Abdomen:     Normal bowel sounds, no masses, no organomegaly, soft        non-tender, non-distended, no guarding, no rebound                tenderness   Rectal:        Extremities:   Moves all extremities well, no edema, no cyanosis, no             redness   Pulses:   Pulses palpable and equal bilaterally   Skin:   No bleeding, bruising or rash   Lymph nodes:   No palpable adenopathy   Neurologic:   A/o x 4 with no deficits       Results Review:   {Results  "Review:[id number]::\"I reviewed the patient's new clinical results.\"    LABS/IMAGING:  Results for orders placed or performed in visit on 09/27/24   Basic metabolic panel    Collection Time: 09/27/24 11:40 AM    Specimen: Blood   Result Value Ref Range    Glucose 84 65 - 99 mg/dL    BUN 22 8 - 23 mg/dL    Creatinine 0.63 0.57 - 1.00 mg/dL    Sodium 141 136 - 145 mmol/L    Potassium 4.5 3.5 - 5.2 mmol/L    Chloride 101 98 - 107 mmol/L    CO2 31.0 (H) 22.0 - 29.0 mmol/L    Calcium 9.6 8.6 - 10.5 mg/dL    BUN/Creatinine Ratio 34.9 (H) 7.0 - 25.0    Anion Gap 9.0 5.0 - 15.0 mmol/L    eGFR 85.4 >60.0 mL/min/1.73        Result Review : Labs  Result Review  Imaging  Med Tab  Media     Assessment & Plan     Incomplete evacuation, symptomatic hemorrhoids, constipation    Discussion with patient.  I counseled her on the importance of increasing fiber in her diet, using over-the-counter fiber supplement, increasing water in her diet.  I offered her flexible sigmoidoscopy for further evaluation with rubber band ligation of her hemorrhoids.  She declined.  Follow-up with me or GI in 2 to 3 weeks if no better.  All questions answered.  She agrees with the plan.  Thank you for the consult.             This document has been electronically signed by Ministerio Nix MD  May 1, 2025 10:02 EDT  "

## 2025-05-02 ENCOUNTER — TELEPHONE (OUTPATIENT)
Dept: INTERNAL MEDICINE | Facility: CLINIC | Age: OVER 89
End: 2025-05-02
Payer: MEDICARE

## 2025-05-02 NOTE — TELEPHONE ENCOUNTER
Caller: Lizzy Emi GRIER    Relationship: Self    Best call back number:   Telephone Information:   Mobile [phone]        Who are you requesting to speak with (clinical staff, provider,  specific staff member): CLINICAL       What was the call regarding: PATIENTS HEMORRHOID CREAM WAS OVER 100$ AND SHE WAS UNABLE TO  WANTING TO KNOW IS THERE A CHEAPER OPTION       STATES HER FAMOTIDINE $  STATES SHE WANT TO STAY ON MEDICATION BUT WANTING TO KNOW IF THERES A PRIOR AUTHORIZATION OR SOMETHING WE CAN DO TO MAKE THIS CHEAPER MONTHLY

## 2025-05-05 NOTE — TELEPHONE ENCOUNTER
Please call her pharmacy and see what they have on hand that is cheap and effective in order that.

## 2025-05-05 NOTE — TELEPHONE ENCOUNTER
Spoke with Pharmacist at [facility]. He states that the patient picked up Hydrocortisone Ace-Pramoxine cream back in April. Insurance will cover this and he believes it is effective. Please advise.

## 2025-05-06 NOTE — TELEPHONE ENCOUNTER
I just tried another one yesterday did that not work either?  In the meantime she can always do some over-the-counter witch hazel pads with over-the-counter hydrocortisone.

## 2025-05-07 NOTE — TELEPHONE ENCOUNTER
Called and spoke with pt, explained to pt provider recommendations, pt verbalized understanding and had no further questions at this time.

## 2025-05-08 ENCOUNTER — PATIENT OUTREACH (OUTPATIENT)
Dept: CASE MANAGEMENT | Facility: OTHER | Age: OVER 89
End: 2025-05-08
Payer: MEDICARE

## 2025-05-08 DIAGNOSIS — I21.9 MYOCARDIAL INFARCTION, UNSPECIFIED MI TYPE, UNSPECIFIED ARTERY: Primary | ICD-10-CM

## 2025-05-08 NOTE — OUTREACH NOTE
AMBULATORY CASE MANAGEMENT NOTE    Names and Relationships of Patient/Support Persons: Contact: Cathy Hartley-[facility]; Relationship:  -     Care Coordination    I spoke with Cathy Hartley with [facility] on the phone today regarding the evaluation and order for a WC for the patient. The evaluation has been completed. She stated she will bring the documents to the PCP office this week to be signed by PCP.    Ronel MARQUIS  Ambulatory Case Management    5/8/2025, 11:55 EDT

## 2025-05-08 NOTE — OUTREACH NOTE
AMBULATORY CASE MANAGEMENT NOTE    Names and Relationships of Patient/Support Persons: Contact: Cathy Hartley-[facility]; Relationship:  -     Care Coordination    I received the detailed evaluation and order from [facility] for patient's PWC. I have completed and placed on Dr. Godfrey's office desk for signatures.    Ronel MARQUIS  Ambulatory Case Management    5/8/2025, 17:07 EDT

## 2025-05-09 ENCOUNTER — PATIENT OUTREACH (OUTPATIENT)
Dept: CASE MANAGEMENT | Facility: OTHER | Age: OVER 89
End: 2025-05-09
Payer: MEDICARE

## 2025-05-09 DIAGNOSIS — I21.9 MYOCARDIAL INFARCTION, UNSPECIFIED MI TYPE, UNSPECIFIED ARTERY: Primary | ICD-10-CM

## 2025-05-09 DIAGNOSIS — R26.2 DIFFICULTY WALKING: ICD-10-CM

## 2025-05-09 DIAGNOSIS — I10 PRIMARY HYPERTENSION: ICD-10-CM

## 2025-05-09 NOTE — OUTREACH NOTE
AMBULATORY CASE MANAGEMENT NOTE    Names and Relationships of Patient/Support Persons:  -     Care Coordination    I faxed over the signed order with PT gavi to [facility] for patient's PWC.    Ronel MARQUIS  Ambulatory Case Management    5/9/2025, 10:38 EDT

## 2025-05-12 NOTE — TELEPHONE ENCOUNTER
Patient called office stating she is in a lot of pain due to hemorrhoids, patient stated she does not have any medication left and would like to know if provider can send something else, please advise

## 2025-05-23 ENCOUNTER — PATIENT OUTREACH (OUTPATIENT)
Dept: CASE MANAGEMENT | Facility: OTHER | Age: OVER 89
End: 2025-05-23
Payer: MEDICARE

## 2025-05-23 DIAGNOSIS — R26.2 DIFFICULTY WALKING: ICD-10-CM

## 2025-05-23 DIAGNOSIS — I21.9 MYOCARDIAL INFARCTION, UNSPECIFIED MI TYPE, UNSPECIFIED ARTERY: Primary | ICD-10-CM

## 2025-05-23 NOTE — OUTREACH NOTE
AMBULATORY CASE MANAGEMENT NOTE    Names and Relationships of Patient/Support Persons: Contact: Cathy Hartley-[facility]; Relationship: Other -     Care Coordination    Cathy Hartley with [facility] communicated with me regarding the patient's PWC. I have printed off a new document, communicated with PCP and had PCP sign it.  has scanned the new document into the patient's chart and I have faxed it to [facility].    Ronel MARQUIS  Ambulatory Case Management    5/23/2025, 10:12 EDT

## 2025-05-30 ENCOUNTER — PATIENT OUTREACH (OUTPATIENT)
Dept: CASE MANAGEMENT | Facility: OTHER | Age: OVER 89
End: 2025-05-30
Payer: MEDICARE

## 2025-05-30 DIAGNOSIS — I21.9 MYOCARDIAL INFARCTION, UNSPECIFIED MI TYPE, UNSPECIFIED ARTERY: Primary | ICD-10-CM

## 2025-05-30 DIAGNOSIS — K21.9 GASTROESOPHAGEAL REFLUX DISEASE WITHOUT ESOPHAGITIS: ICD-10-CM

## 2025-05-30 DIAGNOSIS — I10 PRIMARY HYPERTENSION: ICD-10-CM

## 2025-05-30 NOTE — OUTREACH NOTE
[facility] End of Month Documentation    This Chronic Medical Management Care Plan for Emi Ball, 89 y.o. female, has been monitored and managed and a new plan of care implemented for the month of May.  A cumulative time of 32  minutes was spent on this patient record this month, including phone call with patient; electronic communication with primary care provider; chart review; face to face with provider; phone call with other providers, [facility] ordering process, chronic health conditions.    Regarding the patient's problems: has Gastroesophageal reflux; Myocardial infarction; Otalgia; Pain, head; Scoliosis; Vitamin D deficiency; Protein-calorie malnutrition, unspecified severity; Closed fracture of right inferior pubic ramus; Osteopenia; Debility; Difficulty walking; At high risk for falls; Seasonal allergies; and Primary hypertension on their problem list., the following items were addressed: medical records; medications; changes to medical care and any changes can be found within the plan section of the note.  A detailed listing of time spent for chronic care management is tracked within each outreach encounter.  Current medications include:  has a current medication list which includes the following prescription(s): aspirin, caltrate 600+d3 soft, co q-10, famotidine, fiber adult gummies, glycerin adult, hydrocortisone, hydrocortisone ace-pramoxine, lansoprazole, lisinopril, multiple vitamins-minerals, pediasure grow & gain, pediasure grow & gain, fish oil, psyllium, and vitamin d3. and the patient is reported to be patient is compliant with medication protocol,  Medications are reported to be effective.  Regarding these diagnoses, referrals were made to the following provider(s):  N/A.  All notes on chart for PCP to review.    The patient was monitored remotely for blood pressure; activity level; weight; medications.    The patient's physical needs include:  help taking medications as prescribed; physical  healthcare; medication education; needs assistance with ADLs; hearing care; DME supplies; resources for disability needs; physician referral, Ordered PWC.     The patient's mental support needs include:  not applicable    The patient's cognitive support needs include:  medication; coordination of community providers; needs assistance with ADLs; personal care; health care; caregiver; continued support, Lives in Assisted Living    The patient's psychosocial support needs include:  medication management or adherence; coordination of community providers; continued support    The patient's functional needs include: physical healthcare; needs assistance for ADLs; medication education; hearing care; DME; physician referral    The patient's environmental needs include:  resources for disability needs    Care Plan overall comments:  Ongoing    Refer to previous outreach notes for more information on the areas listed above.    Monthly Billing Diagnoses  (I21.9) Myocardial infarction, unspecified MI type, unspecified artery    (I10) Primary hypertension    (K21.9) Gastroesophageal reflux disease without esophagitis    Medications   Medications have been reconciled    Care Plan progress this month:      Recently Modified Care Plans Updates made since 4/29/2025 12:00 AM      No recently modified care plans.             Fall Risk       Prevent Falls and Injury (Progressing)       Start:  01/14/25    Expected End:  07/14/25         My Fall Prevention To Do List       Start:  01/14/25         Why is this important?  Most falls happen when it is hard for you to walk safely. Your balance may be off because of an illness. You may have pain in your knees, hip or other joints.  You may be overly tired or taking medicines that make you sleepy. You may not be able to see or hear clearly.  Falls can lead to broken bones, bruises or other injuries.  There are things you can do to help prevent falling.         Initial Last    My Fall  Prevention To Do List: always use handrails on the stairs;always wear low-heeled or flat shoes or slippers with nonskid soles;keep my cell phone with me always; clutter from the floors;use a nonslip pad with throw rugs, or remove them completely;use a cane or walker taken at 03/06/25 always use handrails on the stairs;always wear low-heeled or flat shoes or slippers with nonskid soles;keep my cell phone with me always; clutter from the floors;use a nonslip pad with throw rugs, or remove them completely;use a cane or walker taken at 04/16/25            Optimal Care Coordination of a Patient Experiencing Fall Risk (Progressing)       Start:  01/14/25    Expected End:  07/14/25         Identify and Manage Contributors to Fall Risk       Start:  01/14/25          Initial Last    Identify and Manage Contributors to Fall Risk: activities of daily living skills assessed;assistive or adaptive device use encouraged;barriers to physical activity or exercise identified;barriers to safety identified;medication list reviewed taken at 03/06/25 activities of daily living skills assessed;assistive or adaptive device use encouraged;cognition assessed;barriers to safety identified;barriers to physical activity or exercise identified;medication list reviewed taken at 04/16/25             Current Specialty Plan of Care Status signed by both patient and provider    Instructions   Patient was provided an electronic copy of care plan  CCM services were explained and offered and patient has accepted these services.  Patient has given their written consent to receive CCM services and understands that this includes the authorization of electronic communication of medical information with the other treating providers.  Patient understands that they may stop CCM services at any time and these changes will be effective at the end of the calendar month and will effectively revocate the agreement of CCM services.  Patient  understands that only one practitioner can furnish and be paid for CCM services during one calendar month.  Patient also understands that there may be co-payment or deductible fees in association with CCM services.  Patient will continue with at least monthly follow-up calls with the Ambulatory .    Ronel MARQUIS  Ambulatory Case Management    5/30/2025, 12:47 EDT

## 2025-06-18 ENCOUNTER — PATIENT OUTREACH (OUTPATIENT)
Dept: CASE MANAGEMENT | Facility: OTHER | Age: OVER 89
End: 2025-06-18
Payer: MEDICARE

## 2025-06-18 DIAGNOSIS — I10 PRIMARY HYPERTENSION: Primary | ICD-10-CM

## 2025-06-18 NOTE — OUTREACH NOTE
"AMBULATORY CASE MANAGEMENT NOTE    Names and Relationships of Patient/Support Persons: Contact: Lizzy, Emi GRIER; Relationship: Self -     CCM Interim Update    I spoke with the patient on the phone. She stated that she received a PWC but \"it didn't work\" for her. [facility] is in the process of working on getting the patient a different PWC.    Patient states she does not like the food at the assisted living facility in which she resides. She stated she doesn't go to the dining room often to eat. She instead, fixes something in her room. She stated she eats 2 meals a day and drinks one nutritional shake daily. I have encouraged her to drink 2 nutritional shakes daily. She is agreeable to this.    Patient continues to take her lisinipril as prescribed. She stated that she does not have her BP checked daily but could see if the facility can do this for her.    Patient has an appointment with her Primary Care Provider on 7/16. I plan to speak with her face to face at this time due to it being a little challenging to talk/understand on the phone.    Patient stated she has no other needs at this time.    Care Coordination    Her appointment with the Primary Care Provider was initially scheduled as a follow up. I have asked JITENDRA Alves if she can change it to an Annual [facility] Visit.        Education Documentation  No documentation found.        Ronel MARQUIS  Ambulatory Case Management    6/18/2025, 13:33 EDT  "

## 2025-06-18 NOTE — PLAN OF CARE
Problem: Fall Risk  Goal: Prevent Falls and Injury  Outcome: Progressing  Intervention: My Fall Prevention To Do List  Description:   Why is this important?  Most falls happen when it is hard for you to walk safely. Your balance may be off because of an illness. You may have pain in your knees, hip or other joints.  You may be overly tired or taking medicines that make you sleepy. You may not be able to see or hear clearly.  Falls can lead to broken bones, bruises or other injuries.  There are things you can do to help prevent falling.    Flowsheets (Taken 6/18/2025 1329)  My Fall Prevention To Do List:   always use handrails on the stairs   always wear low-heeled or flat shoes or slippers with nonskid soles   keep my cell phone with me always    clutter from the floors   use a nonslip pad with throw rugs, or remove them completely   use a cane or walker   use a nightlight in the bathroom   wear my glasses and/or hearing aid  Goal: Optimal Care Coordination of a Patient Experiencing Fall Risk  Intervention: Identify and Manage Contributors to Fall Risk  Flowsheets (Taken 6/18/2025 6697)  Identify and Manage Contributors to Fall Risk:   assistive or adaptive device use encouraged   barriers to physical activity or exercise identified   barriers to safety identified   medication list reviewed   vision and/or hearing aid use promoted

## 2025-06-27 RX ORDER — LISINOPRIL 40 MG/1
40 TABLET ORAL DAILY
Qty: 90 TABLET | Refills: 1 | Status: SHIPPED | OUTPATIENT
Start: 2025-06-27

## 2025-06-27 NOTE — TELEPHONE ENCOUNTER
Caller: Emi Ball    Relationship: Self    Best call back number: [phone]    Requested Prescriptions:   Requested Prescriptions     Pending Prescriptions Disp Refills    lisinopril (PRINIVIL,ZESTRIL) 40 MG tablet 90 tablet 1     Sig: Take 1 tablet by mouth Daily.        Pharmacy where request should be sent: [facility] [id number]  [place], [place] - [address] AT [facility] [address] - [phone] [facility] [phone] FX     Last office visit with prescribing clinician: 4/9/2025   Last telemedicine visit with prescribing clinician: Visit date not found   Next office visit with prescribing clinician: 7/16/2025       Does the patient have less than a 3 day supply:  [x] Yes  [] No    Would you like a call back once the refill request has been completed: [x] Yes [] No    If the office needs to give you a call back, can they leave a voicemail: [x] Yes [] No    Kal Montemayor   06/27/25 15:25 EDT

## 2025-06-30 ENCOUNTER — PATIENT OUTREACH (OUTPATIENT)
Dept: CASE MANAGEMENT | Facility: OTHER | Age: OVER 89
End: 2025-06-30
Payer: MEDICARE

## 2025-06-30 DIAGNOSIS — I10 PRIMARY HYPERTENSION: Primary | ICD-10-CM

## 2025-06-30 DIAGNOSIS — K21.9 GASTROESOPHAGEAL REFLUX DISEASE WITHOUT ESOPHAGITIS: ICD-10-CM

## 2025-06-30 NOTE — OUTREACH NOTE
[facility] End of Month Documentation    This Chronic Medical Management Care Plan for Emi Ball, 89 y.o. female, has been monitored and managed and a new plan of care implemented for the month of June.  A cumulative time of 20  minutes was spent on this patient record this month, including phone call with patient; chart review.    Regarding the patient's problems: has Gastroesophageal reflux; Myocardial infarction; Otalgia; Pain, head; Scoliosis; Vitamin D deficiency; Protein-calorie malnutrition, unspecified severity; Closed fracture of right inferior pubic ramus; Osteopenia; Debility; Difficulty walking; At high risk for falls; Seasonal allergies; and Primary hypertension on their problem list., the following items were addressed: medical records; medications and any changes can be found within the plan section of the note.  A detailed listing of time spent for chronic care management is tracked within each outreach encounter.  Current medications include:  has a current medication list which includes the following prescription(s): aspirin, caltrate 600+d3 soft, co q-10, famotidine, fiber adult gummies, glycerin adult, hydrocortisone, hydrocortisone ace-pramoxine, lansoprazole, lisinopril, multiple vitamins-minerals, pediasure grow & gain, pediasure grow & gain, fish oil, psyllium, and vitamin d3. and the patient is reported to be patient is compliant with medication protocol,  Medications are reported to be effective.  Regarding these diagnoses, referrals were made to the following provider(s):  N/A.  All notes on chart for PCP to review.    The patient was monitored remotely for blood pressure; activity level; weight; medications.    The patient's physical needs include:  help taking medications as prescribed; physical healthcare; medication education; needs assistance with ADLs; hearing care; DME supplies; resources for disability needs; physician referral.     The patient's mental support needs include:  not  applicable    The patient's cognitive support needs include:  medication; needs assistance with ADLs; personal care; health care; caregiver; continued support, Lives in Assisted Living    The patient's psychosocial support needs include:  medication management or adherence; continued support    The patient's functional needs include: physical healthcare; needs assistance for ADLs; medication education; hearing care; DME; physician referral    The patient's environmental needs include:  resources for disability needs    Care Plan overall comments:  Ongoing    Refer to previous outreach notes for more information on the areas listed above.    Monthly Billing Diagnoses  (I10) Primary hypertension    (K21.9) Gastroesophageal reflux disease without esophagitis    Medications   Medications have been reconciled    Care Plan progress this month:      Recently Modified Care Plans Updates made since 5/30/2025 12:00 AM      No recently modified care plans.             Fall Risk       Prevent Falls and Injury (Progressing)       Start:  01/14/25    Expected End:  07/14/25         My Fall Prevention To Do List       Start:  01/14/25         Why is this important?  Most falls happen when it is hard for you to walk safely. Your balance may be off because of an illness. You may have pain in your knees, hip or other joints.  You may be overly tired or taking medicines that make you sleepy. You may not be able to see or hear clearly.  Falls can lead to broken bones, bruises or other injuries.  There are things you can do to help prevent falling.         Initial Last    My Fall Prevention To Do List: always use handrails on the stairs;always wear low-heeled or flat shoes or slippers with nonskid soles;keep my cell phone with me always; clutter from the floors;use a nonslip pad with throw rugs, or remove them completely;use a cane or walker taken at 03/06/25 always use handrails on the stairs;always wear low-heeled or flat shoes  or slippers with nonskid soles;keep my cell phone with me always; clutter from the floors;use a nonslip pad with throw rugs, or remove them completely;use a cane or walker;use a nightlight in the bathroom;wear my glasses and/or hearing aid taken at 06/18/25            Optimal Care Coordination of a Patient Experiencing Fall Risk (Progressing)       Start:  01/14/25    Expected End:  07/14/25         Identify and Manage Contributors to Fall Risk       Start:  01/14/25          Initial Last    Identify and Manage Contributors to Fall Risk: activities of daily living skills assessed;assistive or adaptive device use encouraged;barriers to physical activity or exercise identified;barriers to safety identified;medication list reviewed taken at 03/06/25 assistive or adaptive device use encouraged;barriers to physical activity or exercise identified;barriers to safety identified;medication list reviewed;vision and/or hearing aid use promoted taken at 06/18/25             Current Specialty Plan of Care Status signed by both patient and provider    Instructions   Patient was provided an electronic copy of care plan  CCM services were explained and offered and patient has accepted these services.  Patient has given their written consent to receive CCM services and understands that this includes the authorization of electronic communication of medical information with the other treating providers.  Patient understands that they may stop CCM services at any time and these changes will be effective at the end of the calendar month and will effectively revocate the agreement of CCM services.  Patient understands that only one practitioner can furnish and be paid for CCM services during one calendar month.  Patient also understands that there may be co-payment or deductible fees in association with CCM services.  Patient will continue with at least monthly follow-up calls with the Ambulatory .    Ronel  K  Ambulatory Case Management    6/30/2025, 11:38 EDT

## 2025-07-16 ENCOUNTER — OFFICE VISIT (OUTPATIENT)
Dept: INTERNAL MEDICINE | Facility: CLINIC | Age: OVER 89
End: 2025-07-16
Payer: MEDICARE

## 2025-07-16 VITALS
RESPIRATION RATE: 22 BRPM | SYSTOLIC BLOOD PRESSURE: 142 MMHG | DIASTOLIC BLOOD PRESSURE: 78 MMHG | TEMPERATURE: 98.9 F | HEART RATE: 75 BPM | OXYGEN SATURATION: 94 % | BODY MASS INDEX: 11.93 KG/M2 | WEIGHT: 65.2 LBS

## 2025-07-16 DIAGNOSIS — E46 PROTEIN-CALORIE MALNUTRITION, UNSPECIFIED SEVERITY: ICD-10-CM

## 2025-07-16 DIAGNOSIS — I10 PRIMARY HYPERTENSION: Primary | ICD-10-CM

## 2025-07-16 DIAGNOSIS — M25.571 ACUTE RIGHT ANKLE PAIN: ICD-10-CM

## 2025-07-16 DIAGNOSIS — R94.31 ABNORMAL EKG: ICD-10-CM

## 2025-07-16 DIAGNOSIS — K21.9 GASTROESOPHAGEAL REFLUX DISEASE WITHOUT ESOPHAGITIS: ICD-10-CM

## 2025-07-16 DIAGNOSIS — M25.551 RIGHT HIP PAIN: ICD-10-CM

## 2025-07-16 DIAGNOSIS — R26.2 DIFFICULTY WALKING: ICD-10-CM

## 2025-07-16 DIAGNOSIS — I49.9 IRREGULAR CARDIAC RHYTHM: ICD-10-CM

## 2025-07-16 RX ORDER — MAG HYDROX/ALUMINUM HYD/SIMETH 400-400-40
1 SUSPENSION, ORAL (FINAL DOSE FORM) ORAL AS NEEDED
Qty: 25 EACH | Refills: 1 | Status: SHIPPED | OUTPATIENT
Start: 2025-07-16

## 2025-07-16 NOTE — PROGRESS NOTES
Subjective   The ABCs of the Annual Wellness Visit  Medicare Wellness Visit      Emi Ball is a 89 y.o. patient who presents for a Medicare Wellness Visit.    The following portions of the patient's history were reviewed and   updated as appropriate: allergies, current medications, past family history, past medical history, past social history, past surgical history, and problem list.    Compared to one year ago, the patient's physical   health is worse.  Compared to one year ago, the patient's mental   health is worse.    Recent Hospitalizations:  She was not admitted to the hospital during the last year.     Current Medical Providers:  Patient Care Team:  Yoselyn Godfrey MD as PCP - General (Internal Medicine)  Ronel Carlton RN as Ambulatory  (Population Health)    Outpatient Medications Prior to Visit   Medication Sig Dispense Refill    aspirin 81 MG EC tablet Take 1 tablet by mouth 3 (Three) Times a Week.      Calcium Carb-Cholecalciferol (Caltrate 600+D3 Soft) 600-800 MG-UNIT chewable tablet       Coenzyme Q10 (Co Q-10) 100 MG capsule       lisinopril (PRINIVIL,ZESTRIL) 40 MG tablet Take 1 tablet by mouth Daily. 90 tablet 1    Multiple Vitamins-Minerals (MULTIVITAMIN ADULT EXTRA C PO) multivitamin Oral Tablet take 1 tablet by oral route once daily with food   Active      Nutritional Supplements (PediaSure Grow & Gain) liquid Take 1 bottle by mouth Daily. 237 mL 0    Omega-3 Fatty Acids (fish oil) 1000 MG capsule capsule Take 1 capsule by mouth Daily With Breakfast.      vitamin D3 125 MCG (5000 UT) capsule capsule Take 1 capsule by mouth Daily.      glycerin adult 2 g suppository Insert 1 suppository into the rectum As Needed for Constipation. 25 each 1    Hydrocortisone Ace-Pramoxine 1.85-1.15 % cream Apply 1 Application topically 2 (Two) Times a Day As Needed (hemorrhoid). 60 g 1    Nutritional Supplements (PediaSure Grow & Gain) liquid Take 1 each by mouth 2 (Two) Times a Day for 30  days. 237 mL 6    psyllium (METAMUCIL) 58.6 % powder Take 1 packet by mouth Daily. (Patient not taking: Reported on 7/16/2025) 30 packet 11    famotidine (Pepcid) 40 MG tablet Take 1 tablet by mouth Daily. (Patient not taking: Reported on 7/16/2025) 90 tablet 1    Fiber Adult Gummies 2 g chewable tablet Chew 2 g every night at bedtime. (Patient not taking: Reported on 7/16/2025) 90 tablet 1    hydrocortisone 1 % ointment Apply 1 Application topically to the appropriate area as directed 2 (Two) Times a Day. (Patient not taking: Reported on 7/16/2025) 28 g 1    lansoprazole (PREVACID SOLUTAB) 15 MG Tablet Delayed Release Dispersible disintegrating tablet Take 1 tablet by mouth Daily. (Patient not taking: Reported on 7/16/2025) 90 tablet 2     No facility-administered medications prior to visit.     No opioid medication identified on active medication list. I have reviewed chart for other potential  high risk medication/s and harmful drug interactions in the elderly.      Aspirin is on active medication list. Aspirin use is indicated based on review of current medical condition/s. Pros and cons of this therapy have been discussed today. Benefits of this medication outweigh potential harm.  Patient has been encouraged to continue taking this medication.  .      Patient Active Problem List   Diagnosis    Gastroesophageal reflux    Myocardial infarction    Otalgia    Pain, head    Scoliosis    Vitamin D deficiency    Protein-calorie malnutrition, unspecified severity    Closed fracture of right inferior pubic ramus    Osteopenia    Debility    Difficulty walking    At high risk for falls    Seasonal allergies    Primary hypertension     Advance Care Planning Advance Directive is not on file.  ACP discussion was held with the patient during this visit. Patient does not have an advance directive, information provided.            Objective   Vitals:    07/16/25 1641   BP: 142/78  Comment: forgot to take medication today   BP  "Location: Right arm   Patient Position: Sitting   Cuff Size: Pediatric   Pulse: 75   Resp: 22   Temp: 98.9 °F (37.2 °C)   TempSrc: Temporal   SpO2: 94%   Weight: 29.6 kg (65 lb 3.2 oz)   PainSc: 5        Estimated body mass index is 11.93 kg/m² as calculated from the following:    Height as of 25: 157.5 cm (62\").    Weight as of this encounter: 29.6 kg (65 lb 3.2 oz).    BMI is below normal parameters (malnutrition). Recommendations: working on taking ensure/pediasure           Does the patient have evidence of cognitive impairment? No       ECG 12 Lead    Date/Time: 2025 11:31 PM  Performed by: Yoselyn Godfrey MD    Authorized by: Yoselyn Godfrey MD  Previous ECG: no previous ECG available  Rhythm: sinus rhythm  Ectopy: multifocal PVCs  Rate: normal  Other findings: non-specific ST-T wave changes    Clinical impression: abnormal EKG  Comments: Some abnormalities could be due to patient positioning                                                                                              Health  Risk Assessment    Smoking Status:  Social History     Tobacco Use   Smoking Status Never    Passive exposure: Never   Smokeless Tobacco Never     Alcohol Consumption:  Social History     Substance and Sexual Activity   Alcohol Use Not Currently    Comment: social       Fall Risk Screen  STEADI Fall Risk Assessment was completed, and patient is at HIGH risk for falls. Assessment completed on:2025    Depression Screening   Little interest or pleasure in doing things? Several days   Feeling down, depressed, or hopeless? Not at all   PHQ-2 Total Score 1      Health Habits and Functional and Cognitive Screenin/16/2025     4:52 PM   Functional & Cognitive Status   Do you have difficulty preparing food and eating? Yes   Do you have difficulty bathing yourself, getting dressed or grooming yourself? Yes   Do you have difficulty using the toilet? No   Do you have difficulty moving around from place " to place? Yes   Do you have trouble with steps or getting out of a bed or a chair? Yes   Current Diet Unhealthy Diet   Dental Exam Up to date   Eye Exam Up to date   Exercise (times per week) 1 times per week   Current Exercises Include Other   Do you need help using the phone?  No   Are you deaf or do you have serious difficulty hearing?  No   Do you need help to go to places out of walking distance? Yes   Do you need help shopping? Yes   Do you need help preparing meals?  Yes   Do you need help with housework?  Yes   Do you need help with laundry? Yes   Do you need help taking your medications? No   Do you need help managing money? No   Do you ever drive or ride in a car without wearing a seat belt? No   Have you felt unusual fatigue (could be tiredness), stress, anger or loneliness in the last month? Yes   Who do you live with? Community   If you need help, do you have trouble finding someone available to you? No   Have you been bothered in the last four weeks by sexual problems? No   Do you have difficulty concentrating, remembering or making decisions? No           Age-appropriate Screening Schedule:  Refer to the list below for future screening recommendations based on patient's age, sex and/or medical conditions. Orders for these recommended tests are listed in the plan section. The patient has been provided with a written plan.    Health Maintenance List  Health Maintenance   Topic Date Due    DXA SCAN  Never done    Pneumococcal Vaccine 50+ (1 of 1 - PCV) Never done    ZOSTER VACCINE (1 of 2) Never done    TDAP/TD VACCINES (2 - Tdap) 07/24/2015    COVID-19 Vaccine (4 - 2024-25 season) 07/29/2025 (Originally 9/1/2024)    INFLUENZA VACCINE  10/01/2025    ANNUAL WELLNESS VISIT  07/16/2026    RSV Vaccine - Adults  Completed                                                                                                                                                CMS Preventative Services Quick Reference  Risk  Factors Identified During Encounter  None Identified    The above risks/problems have been discussed with the patient.  Pertinent information has been shared with the patient in the After Visit Summary.  An After Visit Summary and PPPS were made available to the patient.    Follow Up:   Next Medicare Wellness visit to be scheduled in 1 year.     Power Wheelchair: Patient is unable to safely use a cane, walker or manual wheelchair. Due to immobility related to diagnosis, a power wheelchair is needed to assist with daily living activities inside the home. Patient has the physical ability and mental capabilities to control the power wheelchair inside the home.      Additional E&M Note during same encounter follows:  Patient has additional, significant, and separately identifiable condition(s)/problem(s) that require work above and beyond the Medicare Wellness Visit     Chief Complaint  Medicare Wellness-subsequent, Med Management (Would like to discuss alternative acid reflux medication ), Hip Pain (Since fall 2 weeks ago ), and Fall (2 weeks ago at home. Facility staff notified)    Subjective    HPI  Emi is also being seen today for additional medical problem/s.       The patient presents for evaluation of constipation, right foot pain, hip pain, and mental health.    She has experienced unusual bowel movements for a couple of months, resembling cherry balls glued together, without blood in stool. Her fluid intake is low, preferring health drinks over water, consumed once or twice daily, and occasionally carrot juice.    Approximately 6 weeks ago, she fell on the stairs, hitting her head on the door. X-rays were performed at the hospital. She can move her leg and walk but has swelling and pain in her right foot, making standing difficult. She is considering a wheelchair.    Hip pain has worsened since the fall, making standing uncomfortable for extended periods. She feels her physical health has declined since the  fall. She sees a [facility] specialist weekly and is open to physical therapy.    She forgot her blood pressure medication today and does not monitor her blood pressure at home. No chest pain, breathing difficulties, or palpitations reported.    She feels her mental health is poor and is considering getting a pet to improve her mood. No advanced directive in place. Increased forgetfulness noted but not severe. She does not wish to take medication for sadness. She attends card games twice a week and maintains regular contact with her children. Mood described as neither overly happy nor depressed.          Objective   Vital Signs:  /78 (BP Location: Right arm, Patient Position: Sitting, Cuff Size: Pediatric) Comment: forgot to take medication today  Pulse 75   Temp 98.9 °F (37.2 °C) (Temporal)   Resp 22   Wt 29.6 kg (65 lb 3.2 oz)   SpO2 94%   BMI 11.93 kg/m²   Physical Exam  Vitals reviewed.   Constitutional:       Appearance: Normal appearance. She is well-developed.   HENT:      Head: Normocephalic and atraumatic.      Right Ear: External ear normal.      Left Ear: External ear normal.   Eyes:      Conjunctiva/sclera: Conjunctivae normal.      Pupils: Pupils are equal, round, and reactive to light.   Cardiovascular:      Rate and Rhythm: Normal rate. Rhythm irregular.      Heart sounds: No murmur heard.     No friction rub. No gallop.   Pulmonary:      Effort: Pulmonary effort is normal.      Breath sounds: Normal breath sounds. No wheezing or rhonchi.   Musculoskeletal:      Comments: Swelling in right foot   Skin:     General: Skin is warm and dry.   Neurological:      Mental Status: She is alert and oriented to person, place, and time.   Psychiatric:         Mood and Affect: Affect normal.         Behavior: Behavior normal.         Thought Content: Thought content normal.                     Results             Assessment and Plan        Constipation:  - Stools appear as small balls glued together,  indicating improvement.  - Advised to increase fluid and fiber intake, including more water, apple juice, grape juice, or water with lemon.  - Encouraged to continue health drinks twice daily.    Right foot pain:  - Persistent swelling and pain since fall 6 weeks ago.  - X-rays of hip and ankle will be ordered.  - Order for motorized scooter to assist with mobility.    Hip pain:  - Worsened since fall.  - Physical therapy recommended, with home visits due to inability to drive.  - X-rays of hip will be ordered.    Mental health:  - Reports sadness and anxiety, does not wish to take medication.  - Advised to continue therapy and social activities, such as card games.  - Medication may be considered if symptoms worsen.    Elevated blood pressure:  - Slightly elevated today, likely due to missed medication dose.  - No chest pain or trouble breathing reported.  - Reminded to take medication regularly.  - Emphasized blood pressure monitoring and adherence to medication.    Heart sounded irregular on exam  -EKG was regular with PVCs but as there are several and EKG isn't totally normal will refer to cardiology; though patient's body habitus may contribute to abnormal EKG             Follow Up   No follow-ups on file.  Patient was given instructions and counseling regarding her condition or for health maintenance advice. Please see specific information pulled into the AVS if appropriate.  Patient or patient representative verbalized consent for the use of Ambient Listening during the visit with  Yoselyn Godfrey MD for chart documentation. 7/20/2025  17:47 EDT

## 2025-07-17 ENCOUNTER — TELEPHONE (OUTPATIENT)
Dept: CASE MANAGEMENT | Facility: OTHER | Age: OVER 89
End: 2025-07-17
Payer: MEDICARE

## 2025-07-17 ENCOUNTER — PATIENT OUTREACH (OUTPATIENT)
Dept: CASE MANAGEMENT | Facility: OTHER | Age: OVER 89
End: 2025-07-17
Payer: MEDICARE

## 2025-07-17 DIAGNOSIS — M85.80 OSTEOPENIA, UNSPECIFIED LOCATION: ICD-10-CM

## 2025-07-17 DIAGNOSIS — I10 PRIMARY HYPERTENSION: ICD-10-CM

## 2025-07-17 DIAGNOSIS — R54 FRAILTY: ICD-10-CM

## 2025-07-17 DIAGNOSIS — R26.2 DIFFICULTY WALKING: ICD-10-CM

## 2025-07-17 DIAGNOSIS — I21.9 MYOCARDIAL INFARCTION, UNSPECIFIED MI TYPE, UNSPECIFIED ARTERY: Primary | ICD-10-CM

## 2025-07-17 DIAGNOSIS — E46 PROTEIN-CALORIE MALNUTRITION, UNSPECIFIED SEVERITY: ICD-10-CM

## 2025-07-17 DIAGNOSIS — R26.2 DIFFICULTY WALKING: Primary | ICD-10-CM

## 2025-07-17 DIAGNOSIS — R53.81 DEBILITY: ICD-10-CM

## 2025-07-17 NOTE — PLAN OF CARE
Problem: Fall Risk  Goal: Prevent Falls and Injury  Outcome: Not Progressing  Intervention: My Fall Prevention To Do List  Description:   Why is this important?  Most falls happen when it is hard for you to walk safely. Your balance may be off because of an illness. You may have pain in your knees, hip or other joints.  You may be overly tired or taking medicines that make you sleepy. You may not be able to see or hear clearly.  Falls can lead to broken bones, bruises or other injuries.  There are things you can do to help prevent falling.    Flowsheets (Taken 7/17/2025 0937)  My Fall Prevention To Do List: (patient had a recent fall)   always use handrails on the stairs   always wear low-heeled or flat shoes or slippers with nonskid soles   keep my cell phone with me always   make an emergency alert plan in case I fall    clutter from the floors   use a nonslip pad with throw rugs, or remove them completely   use a cane or walker   attend therapy  Goal: Optimal Care Coordination of a Patient Experiencing Fall Risk  Outcome: Progressing  Intervention: Identify and Manage Contributors to Fall Risk  Flowsheets (Taken 7/17/2025 0913)  Identify and Manage Contributors to Fall Risk:   activities of daily living skills assessed   assistive or adaptive device use encouraged   barriers to physical activity or exercise identified   barriers to safety identified   cognition assessed   medication list reviewed   modification of home and work environment promoted   participation in rehabilitation therapy encouraged

## 2025-07-17 NOTE — OUTREACH NOTE
AMBULATORY CASE MANAGEMENT NOTE    Names and Relationships of Patient/Support Persons: Contact: Cathy Hartley-[facility]; Relationship:   Contact: Caroline Rapp-[facility]; Relationship: Other -     Care Coordination    I spoke with Caroline with [facility] and gave her the referral information to hopefully get the patient started with services for skilled nursing, Physical Therapy and Occupational Therapy.    Ronel MARQUIS  Ambulatory Case Management    7/17/2025, 11:36 EDT

## 2025-07-17 NOTE — TELEPHONE ENCOUNTER
Dr. Godfrey~    I have pended an order for home health for skilled nursing, Physical Therapy and Occupational Therapy. Please sign, if agreeable. I added Occupational Therapy since she is having difficulty with her ADLs and I added skilled nursing so they can do an overall assessment and evaluate medication administration, as well.    I will contact [facility].    Thank you,  Ronel

## 2025-07-17 NOTE — OUTREACH NOTE
AMBULATORY CASE MANAGEMENT NOTE    Names and Relationships of Patient/Support Persons: Contact: Cathy Hartley-[facility]; Relationship:  -     Care Coordination    I received electronic communication from Primary Care Provider, wanting home health services and a motorized scooter for the patient. I pended orders for her to sign.    I called and spoke with Cathy Hartley with [facility] regarding the motorized scooter. We discussed the order placed by Primary Care Provider. She also confirmed that the patient did not end up getting a power wheelchair due to the fit not being correct for her. She was currently driving and unable to discuss in detail. She plans to call me back later.    I called and left messages for Caroline Rapp with [facility] health and with the patient. Awaiting a call back from both.    Ronel MARQUIS  Ambulatory Case Management    7/17/2025, 09:31 EDT

## 2025-07-21 ENCOUNTER — TELEPHONE (OUTPATIENT)
Dept: INTERNAL MEDICINE | Facility: CLINIC | Age: OVER 89
End: 2025-07-21
Payer: MEDICARE

## 2025-07-21 NOTE — TELEPHONE ENCOUNTER
Caller: BRENDAN DOWNS [facility]    Relationship: Home Health    Best call back number: [phone]    What was the call regarding: BRENDAN FROM [facility] STATES THAT SHE EVALUATED THE PATIENT FOR HOME HEALTH TODAY AND WOULD LIKE VERBAL ORDERS TO SEE THE PATIENT ONCE A WEEK FOR NINE WEEKS FOR NURSING EFFECTIVE THIS WEEK. SHE STATES THAT PT AND OT ARE ALSO GOING OT COME OUT AND EVALUATE THE PATIENT.         SHE STATES THAT SHE WENT OVER THE REFERRAL FROM DR WALL AND THE LIST OF MEDICATIONS THAT THE PATIENT IS SUPPOSED TO BE TAKING. SHE STATES THAT THE ONLY MEDICATION THE PATIENT HAS AND IS TAKING IS LISINOPRIL.

## 2025-07-23 ENCOUNTER — TELEPHONE (OUTPATIENT)
Dept: INTERNAL MEDICINE | Facility: CLINIC | Age: OVER 89
End: 2025-07-23
Payer: MEDICARE

## 2025-07-23 NOTE — TELEPHONE ENCOUNTER
Caller: DERRELL    Relationship to patient: [facility]    Best call back number: [phone]     Patient is needing: [facility] HE IS NEEDING A VERBAL ORDERS FOR PHYSICAL THERAPY FOR TWICE A WEEK FOR FOUR WEEKS. ONCE A WEEK FOR FOUR WEEKS.   WHEN HE WAS TAKING HER HEART RATE . HER HEART RATE .

## 2025-08-04 ENCOUNTER — OUTSIDE FACILITY SERVICE (OUTPATIENT)
Dept: INTERNAL MEDICINE | Facility: CLINIC | Age: OVER 89
End: 2025-08-04
Payer: MEDICARE

## 2025-08-12 ENCOUNTER — PATIENT OUTREACH (OUTPATIENT)
Dept: CASE MANAGEMENT | Facility: OTHER | Age: OVER 89
End: 2025-08-12
Payer: MEDICARE

## 2025-08-12 ENCOUNTER — TELEPHONE (OUTPATIENT)
Dept: INTERNAL MEDICINE | Facility: CLINIC | Age: OVER 89
End: 2025-08-12
Payer: MEDICARE

## 2025-08-14 ENCOUNTER — PATIENT OUTREACH (OUTPATIENT)
Age: OVER 89
End: 2025-08-14
Payer: MEDICARE

## 2025-08-15 ENCOUNTER — TELEPHONE (OUTPATIENT)
Dept: INTERNAL MEDICINE | Facility: CLINIC | Age: OVER 89
End: 2025-08-15
Payer: MEDICARE

## 2025-08-19 ENCOUNTER — PATIENT OUTREACH (OUTPATIENT)
Age: OVER 89
End: 2025-08-19
Payer: MEDICARE

## 2025-08-20 ENCOUNTER — OFFICE VISIT (OUTPATIENT)
Dept: INTERNAL MEDICINE | Facility: CLINIC | Age: OVER 89
End: 2025-08-20
Payer: MEDICARE

## 2025-08-20 VITALS
HEART RATE: 84 BPM | RESPIRATION RATE: 22 BRPM | SYSTOLIC BLOOD PRESSURE: 148 MMHG | BODY MASS INDEX: 13.07 KG/M2 | OXYGEN SATURATION: 92 % | HEIGHT: 62 IN | DIASTOLIC BLOOD PRESSURE: 70 MMHG | TEMPERATURE: 99.4 F | WEIGHT: 71 LBS

## 2025-08-20 DIAGNOSIS — R53.81 DEBILITY: ICD-10-CM

## 2025-08-20 DIAGNOSIS — R05.9 COUGH, UNSPECIFIED TYPE: ICD-10-CM

## 2025-08-20 DIAGNOSIS — M25.552 PAIN OF LEFT HIP: ICD-10-CM

## 2025-08-20 DIAGNOSIS — M54.50 CHRONIC BILATERAL LOW BACK PAIN WITHOUT SCIATICA: ICD-10-CM

## 2025-08-20 DIAGNOSIS — G89.29 CHRONIC BILATERAL LOW BACK PAIN WITHOUT SCIATICA: ICD-10-CM

## 2025-08-20 DIAGNOSIS — E55.9 VITAMIN D DEFICIENCY: ICD-10-CM

## 2025-08-20 DIAGNOSIS — I10 PRIMARY HYPERTENSION: ICD-10-CM

## 2025-08-20 DIAGNOSIS — R53.83 OTHER FATIGUE: ICD-10-CM

## 2025-08-20 DIAGNOSIS — E46 PROTEIN-CALORIE MALNUTRITION, UNSPECIFIED SEVERITY: ICD-10-CM

## 2025-08-20 DIAGNOSIS — J02.9 SORE THROAT: Primary | ICD-10-CM

## 2025-08-20 RX ORDER — CETIRIZINE HYDROCHLORIDE 5 MG/1
5 TABLET ORAL DAILY
Qty: 150 ML | Refills: 1 | Status: SHIPPED | OUTPATIENT
Start: 2025-08-20

## 2025-08-29 ENCOUNTER — LAB (OUTPATIENT)
Dept: LAB | Facility: HOSPITAL | Age: OVER 89
End: 2025-08-29
Payer: MEDICARE

## 2025-08-29 DIAGNOSIS — I10 PRIMARY HYPERTENSION: ICD-10-CM

## 2025-08-29 DIAGNOSIS — R26.2 DIFFICULTY WALKING: ICD-10-CM

## 2025-08-29 DIAGNOSIS — E46 PROTEIN-CALORIE MALNUTRITION, UNSPECIFIED SEVERITY: ICD-10-CM

## 2025-08-29 LAB
25(OH)D3 SERPL-MCNC: 118 NG/ML (ref 30–100)
ALBUMIN SERPL-MCNC: 4 G/DL (ref 3.5–5.2)
ALBUMIN/GLOB SERPL: 1.4 G/DL
ALP SERPL-CCNC: 53 U/L (ref 39–117)
ALT SERPL W P-5'-P-CCNC: 19 U/L (ref 1–33)
ANION GAP SERPL CALCULATED.3IONS-SCNC: 9 MMOL/L (ref 5–15)
AST SERPL-CCNC: 32 U/L (ref 1–32)
BASOPHILS # BLD AUTO: 0.03 10*3/MM3 (ref 0–0.2)
BASOPHILS NFR BLD AUTO: 0.6 % (ref 0–1.5)
BILIRUB SERPL-MCNC: 0.7 MG/DL (ref 0–1.2)
BUN SERPL-MCNC: 19 MG/DL (ref 8–23)
BUN/CREAT SERPL: 31.7 (ref 7–25)
CALCIUM SPEC-SCNC: 9.7 MG/DL (ref 8.6–10.5)
CHLORIDE SERPL-SCNC: 100 MMOL/L (ref 98–107)
CHOLEST SERPL-MCNC: 286 MG/DL (ref 0–200)
CO2 SERPL-SCNC: 32 MMOL/L (ref 22–29)
CREAT SERPL-MCNC: 0.6 MG/DL (ref 0.57–1)
DEPRECATED RDW RBC AUTO: 45.3 FL (ref 37–54)
EGFRCR SERPLBLD CKD-EPI 2021: 85.9 ML/MIN/1.73
EOSINOPHIL # BLD AUTO: 0.09 10*3/MM3 (ref 0–0.4)
EOSINOPHIL NFR BLD AUTO: 1.8 % (ref 0.3–6.2)
ERYTHROCYTE [DISTWIDTH] IN BLOOD BY AUTOMATED COUNT: 12.7 % (ref 12.3–15.4)
FOLATE SERPL-MCNC: >20 NG/ML (ref 4.78–24.2)
GLOBULIN UR ELPH-MCNC: 2.8 GM/DL
GLUCOSE SERPL-MCNC: 91 MG/DL (ref 65–99)
HCT VFR BLD AUTO: 43.5 % (ref 34–46.6)
HDLC SERPL-MCNC: 99 MG/DL (ref 40–60)
HGB BLD-MCNC: 13.7 G/DL (ref 12–15.9)
IMM GRANULOCYTES # BLD AUTO: 0.01 10*3/MM3 (ref 0–0.05)
IMM GRANULOCYTES NFR BLD AUTO: 0.2 % (ref 0–0.5)
IRON 24H UR-MRATE: 119 MCG/DL (ref 37–145)
IRON SATN MFR SERPL: 31 % (ref 20–50)
LDLC SERPL CALC-MCNC: 171 MG/DL (ref 0–100)
LDLC/HDLC SERPL: 1.69 {RATIO}
LYMPHOCYTES # BLD AUTO: 2.22 10*3/MM3 (ref 0.7–3.1)
LYMPHOCYTES NFR BLD AUTO: 43.5 % (ref 19.6–45.3)
MAGNESIUM SERPL-MCNC: 2.1 MG/DL (ref 1.6–2.4)
MCH RBC QN AUTO: 30.6 PG (ref 26.6–33)
MCHC RBC AUTO-ENTMCNC: 31.5 G/DL (ref 31.5–35.7)
MCV RBC AUTO: 97.3 FL (ref 79–97)
MONOCYTES # BLD AUTO: 0.4 10*3/MM3 (ref 0.1–0.9)
MONOCYTES NFR BLD AUTO: 7.8 % (ref 5–12)
NEUTROPHILS NFR BLD AUTO: 2.35 10*3/MM3 (ref 1.7–7)
NEUTROPHILS NFR BLD AUTO: 46.1 % (ref 42.7–76)
NRBC BLD AUTO-RTO: 0 /100 WBC (ref 0–0.2)
PLATELET # BLD AUTO: 225 10*3/MM3 (ref 140–450)
PMV BLD AUTO: 10.2 FL (ref 6–12)
POTASSIUM SERPL-SCNC: 4.5 MMOL/L (ref 3.5–5.2)
PROT SERPL-MCNC: 6.8 G/DL (ref 6–8.5)
RBC # BLD AUTO: 4.47 10*6/MM3 (ref 3.77–5.28)
SODIUM SERPL-SCNC: 141 MMOL/L (ref 136–145)
TIBC SERPL-MCNC: 386 MCG/DL (ref 298–536)
TRANSFERRIN SERPL-MCNC: 259 MG/DL (ref 200–360)
TRIGL SERPL-MCNC: 98 MG/DL (ref 0–150)
TSH SERPL DL<=0.05 MIU/L-ACNC: 0.86 UIU/ML (ref 0.27–4.2)
VIT B12 BLD-MCNC: 1111 PG/ML (ref 211–946)
VLDLC SERPL-MCNC: 16 MG/DL (ref 5–40)
WBC NRBC COR # BLD AUTO: 5.1 10*3/MM3 (ref 3.4–10.8)

## 2025-08-29 PROCEDURE — 83735 ASSAY OF MAGNESIUM: CPT | Performed by: INTERNAL MEDICINE

## 2025-08-29 PROCEDURE — 84466 ASSAY OF TRANSFERRIN: CPT | Performed by: INTERNAL MEDICINE

## 2025-08-29 PROCEDURE — 82306 VITAMIN D 25 HYDROXY: CPT | Performed by: INTERNAL MEDICINE

## 2025-08-29 PROCEDURE — 80053 COMPREHEN METABOLIC PANEL: CPT | Performed by: INTERNAL MEDICINE

## 2025-08-29 PROCEDURE — 82607 VITAMIN B-12: CPT | Performed by: INTERNAL MEDICINE

## 2025-08-29 PROCEDURE — 80061 LIPID PANEL: CPT | Performed by: INTERNAL MEDICINE

## 2025-08-29 PROCEDURE — 85025 COMPLETE CBC W/AUTO DIFF WBC: CPT | Performed by: INTERNAL MEDICINE

## 2025-08-29 PROCEDURE — 82746 ASSAY OF FOLIC ACID SERUM: CPT | Performed by: INTERNAL MEDICINE

## 2025-08-29 PROCEDURE — 83540 ASSAY OF IRON: CPT | Performed by: INTERNAL MEDICINE

## 2025-08-29 PROCEDURE — 84443 ASSAY THYROID STIM HORMONE: CPT
